# Patient Record
Sex: MALE | Race: WHITE | NOT HISPANIC OR LATINO | Employment: OTHER | URBAN - METROPOLITAN AREA
[De-identification: names, ages, dates, MRNs, and addresses within clinical notes are randomized per-mention and may not be internally consistent; named-entity substitution may affect disease eponyms.]

---

## 2017-02-14 ENCOUNTER — OFFICE VISIT (OUTPATIENT)
Dept: INTERNAL MEDICINE | Facility: CLINIC | Age: 42
End: 2017-02-14
Payer: MEDICARE

## 2017-02-14 VITALS
DIASTOLIC BLOOD PRESSURE: 72 MMHG | WEIGHT: 166.69 LBS | TEMPERATURE: 99 F | SYSTOLIC BLOOD PRESSURE: 110 MMHG | HEIGHT: 70 IN | BODY MASS INDEX: 23.86 KG/M2 | RESPIRATION RATE: 18 BRPM | HEART RATE: 108 BPM

## 2017-02-14 DIAGNOSIS — M51.35 DDD (DEGENERATIVE DISC DISEASE), THORACOLUMBAR: Primary | ICD-10-CM

## 2017-02-14 DIAGNOSIS — G47.00 INSOMNIA, UNSPECIFIED TYPE: ICD-10-CM

## 2017-02-14 PROCEDURE — 99999 PR PBB SHADOW E&M-EST. PATIENT-LVL III: CPT | Mod: PBBFAC,,, | Performed by: INTERNAL MEDICINE

## 2017-02-14 PROCEDURE — 99213 OFFICE O/P EST LOW 20 MIN: CPT | Mod: PBBFAC,PO | Performed by: INTERNAL MEDICINE

## 2017-02-14 PROCEDURE — 99213 OFFICE O/P EST LOW 20 MIN: CPT | Mod: S$PBB,,, | Performed by: INTERNAL MEDICINE

## 2017-02-14 RX ORDER — DEXTROAMPHETAMINE SACCHARATE, AMPHETAMINE ASPARTATE MONOHYDRATE, DEXTROAMPHETAMINE SULFATE AND AMPHETAMINE SULFATE 7.5; 7.5; 7.5; 7.5 MG/1; MG/1; MG/1; MG/1
CAPSULE, EXTENDED RELEASE ORAL
COMMUNITY
Start: 2017-02-13 | End: 2017-10-03

## 2017-02-14 NOTE — PROGRESS NOTES
CC: followup of back pain  HPI:  The patient is a 41 y.o. year old male who presents to the office for followup of back pain.  He complains of persistent dull aching sensation in mid back. He is unable to sit or stand for prolonged periods of time. Pain is exacerbated with flexion at the waist, but relieved with lying down. At worst, pain is 8/10. He has been taking ibuprofen 800 mg.  He only takes ibuprofen once every couple of weeks.  He reports being unable to fish is depressing.  The patient reports difficulty sleeping at night due to pain.  He has not taken gabapentin because he does not want to take medication.    PAST MEDICAL HISTORY:  Past Medical History   Diagnosis Date    Depression     Peptic ulcer     Scoliosis        SURGICAL HISTORY:  Past Surgical History   Procedure Laterality Date    Knee arthroscopy         MEDS:  Medcard reviewed and updated    ALLERGIES: Allergy Card reviewed and updated    SOCIAL HISTORY:   The patient is a nonsmoker.    PE:   APPEARANCE: Well nourished, well developed, in no acute distress.    CHEST: Lungs clear to auscultation with unlabored respirations.  CARDIOVASCULAR: Normal S1, S2. No murmurs. No carotid bruits. No pedal edema.  ABDOMEN: Bowel sounds normal. Not distended. Soft. No tenderness or masses.   MUSCULOSKELETAL:  Positive tenderness to palpation to mid back.  PSYCHIATRIC: The patient is oriented to person, place, and time and has a pleasant affect.        ASSESSMENT/PLAN:  Luis Daniel was seen today for follow-up.    Diagnoses and all orders for this visit:    DDD (degenerative disc disease), thoracolumbar  -     Continue ibuprofen  -     Patient is totally disabled from work    Insomnia, unspecified type  -     Recommend new mattress

## 2017-02-14 NOTE — MR AVS SNAPSHOT
[facility]   [facility]  [place] [address]  Phone: [phone]  Fax: [phone]                  Luis Daniel Wynne Jr.   2017 9:40 AM   Office Visit    Description:  Male : 1975   Provider:  Zenobia Dunbar MD   Department:  [facility]           Reason for Visit     Follow-up           Diagnoses this Visit        Comments    DDD (degenerative disc disease), thoracolumbar    -  Primary     Insomnia, unspecified type                To Do List           Goals (5 Years of Data)     None      Follow-Up and Disposition     Return in about 3 months (around 2017).      [facility] On Call     [facility] On Call Nurse [facility] -  Assistance  Registered nurses in the [facility] On Call Center provide clinical advisement, health education, appointment booking, and other advisory services.  Call for this free service at [phone].             Medications           Message regarding Medications     Verify the changes and/or additions to your medication regime listed below are the same as discussed with your clinician today.  If any of these changes or additions are incorrect, please notify your healthcare provider.        STOP taking these medications     VYVANSE 70 mg capsule     gabapentin (NEURONTIN) 100 MG capsule Take 1 capsule at bedtime x 1 week, then increase to 2 capsules at bedtime x 1 week, then increase to 3 capsules at bedtime.           Verify that the below list of medications is an accurate representation of the medications you are currently taking.  If none reported, the list may be blank. If incorrect, please contact your healthcare provider. Carry this list with you in case of emergency.           Current Medications     dextroamphetamine-amphetamine (ADDERALL XR) 30 MG 24 hr capsule     ibuprofen (ADVIL,MOTRIN) 800 MG tablet Take 1 tablet (800 mg total) by mouth every 6 (six) hours as needed.    ibuprofen (ADVIL,MOTRIN)  "800 MG tablet Take 1 tablet (800 mg total) by mouth every 6 (six) hours as needed.    venlafaxine (EFFEXOR-XR) 150 MG Cp24     venlafaxine (EFFEXOR-XR) 37.5 MG 24 hr capsule            Clinical Reference Information           Your Vitals Were     BP Pulse Temp Resp Height Weight    110/72 (BP Location: Left arm, Patient Position: Sitting, BP Method: Manual) 108 98.6 °F (37 °C) (Oral) 18 5' 10" (1.778 m) 75.6 kg (166 lb 10.7 oz)    BMI                23.91 kg/m2          Blood Pressure          Most Recent Value    BP  110/72      Allergies as of 2/14/2017     No Known Allergies      Immunizations Administered on Date of Encounter - 2/14/2017     None      Smoking Cessation     If you would like to quit smoking:   You may be eligible for free services if you are a [place] resident and started smoking cigarettes before September 1, 1988.  Call the [employer]oking [employer]ess[employer] ([facility]) toll free at [phone] or [phone].   Call 2-589-QUIT-NOW if you do not meet the above criteria.            Language Assistance Services     ATTENTION: Language assistance services are available, free of charge. Please call [phone].      ATENCIÓN: Si claytonla oriana, tiene a scanlon disposición servicios gratuitos de asistencia lingüística. Llame al [phone].     CHASITY Ý: N?u b?n nói Ti?ng Vi?t, có các d?ch v? h? tr? ngôn ng? mi?n phí dành cho b?n. G?i s? [phone].         [facility] complies with applicable Federal civil rights laws and does not discriminate on the basis of race, color, national origin, age, disability, or sex.        "

## 2017-10-03 ENCOUNTER — OFFICE VISIT (OUTPATIENT)
Dept: INTERNAL MEDICINE | Facility: CLINIC | Age: 42
End: 2017-10-03
Payer: MEDICARE

## 2017-10-03 VITALS
HEART RATE: 88 BPM | BODY MASS INDEX: 22.85 KG/M2 | TEMPERATURE: 98 F | SYSTOLIC BLOOD PRESSURE: 100 MMHG | DIASTOLIC BLOOD PRESSURE: 80 MMHG | WEIGHT: 159.63 LBS | HEIGHT: 70 IN

## 2017-10-03 DIAGNOSIS — M51.35 DDD (DEGENERATIVE DISC DISEASE), THORACOLUMBAR: Primary | ICD-10-CM

## 2017-10-03 DIAGNOSIS — M79.601 RIGHT ARM PAIN: ICD-10-CM

## 2017-10-03 PROCEDURE — 99999 PR PBB SHADOW E&M-EST. PATIENT-LVL III: CPT | Mod: PBBFAC,,, | Performed by: INTERNAL MEDICINE

## 2017-10-03 PROCEDURE — 99213 OFFICE O/P EST LOW 20 MIN: CPT | Mod: PBBFAC,PO | Performed by: INTERNAL MEDICINE

## 2017-10-03 PROCEDURE — 99213 OFFICE O/P EST LOW 20 MIN: CPT | Mod: S$PBB,,, | Performed by: INTERNAL MEDICINE

## 2017-10-03 RX ORDER — VENLAFAXINE HYDROCHLORIDE 150 MG/1
TABLET, EXTENDED RELEASE ORAL
COMMUNITY
Start: 2017-07-29 | End: 2018-06-27 | Stop reason: SDUPTHER

## 2017-10-03 RX ORDER — DEXTROAMPHETAMINE SACCHARATE, AMPHETAMINE ASPARTATE MONOHYDRATE, DEXTROAMPHETAMINE SULFATE AND AMPHETAMINE SULFATE 5; 5; 5; 5 MG/1; MG/1; MG/1; MG/1
CAPSULE, EXTENDED RELEASE ORAL
COMMUNITY
Start: 2017-09-25 | End: 2019-06-07

## 2017-10-03 RX ORDER — VENLAFAXINE HYDROCHLORIDE 75 MG/1
75 CAPSULE, EXTENDED RELEASE ORAL DAILY
Start: 2017-10-03 | End: 2019-06-07 | Stop reason: DRUGHIGH

## 2017-10-03 RX ORDER — DEXTROAMPHETAMINE SACCHARATE, AMPHETAMINE ASPARTATE, DEXTROAMPHETAMINE SULFATE AND AMPHETAMINE SULFATE 5; 5; 5; 5 MG/1; MG/1; MG/1; MG/1
TABLET ORAL
COMMUNITY
Start: 2017-08-15 | End: 2019-06-07

## 2017-10-03 NOTE — PROGRESS NOTES
CC: followup of back pain  HPI:  The patient is a 41 y.o. year old male who presents to the office for followup of back pain.  He reports right arm pain after trying to do some painting at his house.  He now reports intermittent difficulty grabbing things.  Pain extends down his right arm.  The patient states his back then started to hurt again after sleeping awkwardly.  Back pain is a persistent dull aching sensation in mid back. He is unable to sit or stand for prolonged periods of time. Pain is exacerbated with flexion at the waist, but relieved with lying down. At worst, pain is 7.5/10. He has been taking ibuprofen 800 mg recently.  Right arm pain is a constant throbbing sensation, 8/10 on a pain scale.  He states it hurts him more than his back.       PAST MEDICAL HISTORY:  Past Medical History:   Diagnosis Date    Depression     Fibromyalgia     Peptic ulcer     Scoliosis        SURGICAL HISTORY:  Past Surgical History:   Procedure Laterality Date    KNEE ARTHROSCOPY         MEDS:  Medcard reviewed and updated    ALLERGIES: Allergy Card reviewed and updated    SOCIAL HISTORY:   The patient is a nonsmoker.    PE:   APPEARANCE: Well nourished, well developed, in no acute distress.    CHEST: Lungs clear to auscultation with unlabored respirations.  CARDIOVASCULAR: Normal S1, S2. No murmurs. No carotid bruits. No pedal edema.  ABDOMEN: Bowel sounds normal. Not distended. Soft. No tenderness or masses.   MUSCULOSKELETAL:  Normal gait, positive tenderness to palpation to midback.  PSYCHIATRIC: The patient is oriented to person, place, and time and has a pleasant affect.        ASSESSMENT/PLAN:  Luis Daniel was seen today for follow-up and arm pain.    Diagnoses and all orders for this visit:    DDD (degenerative disc disease), thoracolumbar  -     Continue ibuprofen and venlafaxine    Right arm pain  -     Continue ibuprofen and venlafaxine    Other orders  -     venlafaxine (EFFEXOR-XR) 75 MG 24 hr capsule; Take 1  capsule (75 mg total) by mouth once daily.

## 2018-03-21 ENCOUNTER — OFFICE VISIT (OUTPATIENT)
Dept: INTERNAL MEDICINE | Facility: CLINIC | Age: 43
End: 2018-03-21
Payer: MEDICARE

## 2018-03-21 VITALS
SYSTOLIC BLOOD PRESSURE: 139 MMHG | DIASTOLIC BLOOD PRESSURE: 80 MMHG | TEMPERATURE: 98 F | BODY MASS INDEX: 22.32 KG/M2 | WEIGHT: 155.88 LBS | HEIGHT: 70 IN | HEART RATE: 88 BPM | RESPIRATION RATE: 16 BRPM

## 2018-03-21 DIAGNOSIS — M51.35 DDD (DEGENERATIVE DISC DISEASE), THORACOLUMBAR: Primary | ICD-10-CM

## 2018-03-21 DIAGNOSIS — M79.7 FIBROMYALGIA: ICD-10-CM

## 2018-03-21 DIAGNOSIS — E78.5 DYSLIPIDEMIA: ICD-10-CM

## 2018-03-21 PROCEDURE — 99213 OFFICE O/P EST LOW 20 MIN: CPT | Mod: PBBFAC,PO | Performed by: INTERNAL MEDICINE

## 2018-03-21 PROCEDURE — 99999 PR PBB SHADOW E&M-EST. PATIENT-LVL III: CPT | Mod: PBBFAC,,, | Performed by: INTERNAL MEDICINE

## 2018-03-21 PROCEDURE — 99213 OFFICE O/P EST LOW 20 MIN: CPT | Mod: S$PBB,,, | Performed by: INTERNAL MEDICINE

## 2018-03-21 RX ORDER — AMOXICILLIN 500 MG/1
CAPSULE ORAL
COMMUNITY
Start: 2018-01-27 | End: 2018-03-21

## 2018-03-21 RX ORDER — VENLAFAXINE HYDROCHLORIDE 150 MG/1
300 CAPSULE, EXTENDED RELEASE ORAL DAILY
COMMUNITY
Start: 2018-03-02 | End: 2023-09-30

## 2018-03-21 RX ORDER — EPINEPHRINE 0.3 MG/.3ML
1 INJECTION SUBCUTANEOUS ONCE
Qty: 1 EACH | Refills: 3 | Status: SHIPPED | OUTPATIENT
Start: 2018-03-21 | End: 2022-08-22

## 2018-03-21 RX ORDER — IBUPROFEN 800 MG/1
TABLET ORAL
COMMUNITY
Start: 2018-03-05 | End: 2018-09-10 | Stop reason: SDUPTHER

## 2018-03-21 RX ORDER — AMOXICILLIN 500 MG/1
TABLET, FILM COATED ORAL
COMMUNITY
Start: 2018-03-05 | End: 2018-03-21

## 2018-03-21 RX ORDER — TIZANIDINE 4 MG/1
4 TABLET ORAL NIGHTLY PRN
Qty: 30 TABLET | Refills: 3 | Status: SHIPPED | OUTPATIENT
Start: 2018-03-21 | End: 2019-09-09 | Stop reason: SDUPTHER

## 2018-03-21 NOTE — PROGRESS NOTES
CC: followup of back pain  HPI:  The patient is a 42 y.o. year old male who presents to the office for followup of  ba pain.  He reports suffering a compression fracture last October after being attacked by wasps.  He fell on his gluteus while walking up a slope.  Pain had improved after a few weeks.  He continues to experience pain due to fibromyalgia, primarily right shoulder pain.  The patient complains of knots in his muscles when he is in pain intermittently.  He complains of painful moles on bilateral legs.  He reports recent polyuria, but symptoms have resolved.    PAST MEDICAL HISTORY:  Past Medical History:   Diagnosis Date    Depression     Fibromyalgia     Peptic ulcer     Scoliosis        SURGICAL HISTORY:  Past Surgical History:   Procedure Laterality Date    KNEE ARTHROSCOPY         MEDS:  Medcard reviewed and updated    ALLERGIES: Allergy Card reviewed and updated    SOCIAL HISTORY:   The patient is a nonsmoker.    PE:   APPEARANCE: Well nourished, well developed, in no acute distress.    CHEST: Lungs clear to auscultation with unlabored respirations.  CARDIOVASCULAR: Normal S1, S2. No murmurs. No carotid bruits. No pedal edema.  ABDOMEN: Bowel sounds normal. Not distended. Soft. No tenderness or masses.   PSYCHIATRIC: The patient is oriented to person, place, and time and has a pleasant affect.        ASSESSMENT/PLAN:  Diagnoses and all orders for this visit:    DDD (degenerative disc disease), thoracolumbar  -     Continue anti-inflammatory    Fibromyalgia  -     Start zanaflex    Other orders  -     tiZANidine (ZANAFLEX) 4 MG tablet; Take 1 tablet (4 mg total) by mouth nightly as needed.  -     EPINEPHrine (EPIPEN) 0.3 mg/0.3 mL AtIn; Inject 0.3 mLs (0.3 mg total) into the muscle once.

## 2018-06-27 ENCOUNTER — OFFICE VISIT (OUTPATIENT)
Dept: INTERNAL MEDICINE | Facility: CLINIC | Age: 43
End: 2018-06-27
Payer: MEDICARE

## 2018-06-27 VITALS
HEART RATE: 88 BPM | WEIGHT: 155.44 LBS | SYSTOLIC BLOOD PRESSURE: 140 MMHG | HEIGHT: 70 IN | DIASTOLIC BLOOD PRESSURE: 70 MMHG | BODY MASS INDEX: 22.25 KG/M2 | TEMPERATURE: 98 F

## 2018-06-27 DIAGNOSIS — M79.7 FIBROMYALGIA: Primary | ICD-10-CM

## 2018-06-27 DIAGNOSIS — M51.35 DDD (DEGENERATIVE DISC DISEASE), THORACOLUMBAR: ICD-10-CM

## 2018-06-27 DIAGNOSIS — B07.9 VIRAL WART ON FINGER: ICD-10-CM

## 2018-06-27 PROCEDURE — 99214 OFFICE O/P EST MOD 30 MIN: CPT | Mod: S$PBB,,, | Performed by: INTERNAL MEDICINE

## 2018-06-27 PROCEDURE — 99999 PR PBB SHADOW E&M-EST. PATIENT-LVL IV: CPT | Mod: PBBFAC,,, | Performed by: INTERNAL MEDICINE

## 2018-06-27 PROCEDURE — 99214 OFFICE O/P EST MOD 30 MIN: CPT | Mod: PBBFAC,PO | Performed by: INTERNAL MEDICINE

## 2018-06-27 NOTE — PROGRESS NOTES
CC: followup of back pain  HPI:  The patient is a 42 y.o. year old male who presents to the office for followup of back pain.  He complains of low back pain a couple of weeks ago that resolved spontaneously.  He reports TENS unit helps with back spasms.  Patient request a new unit due to previous unit no longer functioning.    He reports right shoulder pain that resolved spontaneously.  He complains of a wart on the second digit of right hand.  He complains of pain in the affected finger.    PAST MEDICAL HISTORY:  Past Medical History:   Diagnosis Date    Depression     Fibromyalgia     Peptic ulcer     Scoliosis        SURGICAL HISTORY:  Past Surgical History:   Procedure Laterality Date    KNEE ARTHROSCOPY         MEDS:  Medcard reviewed and updated    ALLERGIES: Allergy Card reviewed and updated    SOCIAL HISTORY:   The patient is a nonsmoker.    PE:   APPEARANCE: Well nourished, well developed, in no acute distress.    CHEST: Lungs clear to auscultation with unlabored respirations.  CARDIOVASCULAR: Normal S1, S2. No murmurs. No carotid bruits. No pedal edema.  ABDOMEN: Bowel sounds normal. Not distended. Soft. No tenderness or masses.   MUSCULOSKELETAL:  Normal gait.  Positive tenderness to palpation of upper to mid back.  SKIN: Positive wart of second digit of right hand.  PSYCHIATRIC: The patient is oriented to person, place, and time and has a pleasant affect.        ASSESSMENT/PLAN:  Luis Daniel was seen today for follow-up.    Diagnoses and all orders for this visit:    Fibromyalgia  -     Continue Zanaflex    DDD (degenerative disc disease), thoracolumbar  -    Continue Zanaflex     Viral wart on finger  -     Ambulatory Referral to Dermatology    Other orders  -     TENS units Sri; 1 Units by Misc.(Non-Drug; Combo Route) route once daily.

## 2018-06-29 ENCOUNTER — TELEPHONE (OUTPATIENT)
Dept: DERMATOLOGY | Facility: CLINIC | Age: 43
End: 2018-06-29

## 2018-06-29 NOTE — TELEPHONE ENCOUNTER
----- Message from Joseph Castro sent at 6/29/2018  4:05 PM CDT -----  Contact: Patient   Patient Returning Call from [facility]    Who Left Message for Patient: Mayuri   Communication Preference: phone# [phone]  Additional Information:

## 2018-06-29 NOTE — TELEPHONE ENCOUNTER
----- Message from Joseph Castro sent at 6/29/2018  3:38 PM CDT -----  Contact: Patient   Patient Requesting Sooner Appointment.     Reason for sooner appt.: painful wart on index finger   When is the first available appointment? 8/15/18  Communication Preference: phone#  [phone]  Additional Information:

## 2018-09-10 ENCOUNTER — OFFICE VISIT (OUTPATIENT)
Dept: INTERNAL MEDICINE | Facility: CLINIC | Age: 43
End: 2018-09-10
Payer: MEDICARE

## 2018-09-10 VITALS
BODY MASS INDEX: 22.09 KG/M2 | HEIGHT: 70 IN | RESPIRATION RATE: 12 BRPM | WEIGHT: 154.31 LBS | HEART RATE: 76 BPM | DIASTOLIC BLOOD PRESSURE: 60 MMHG | SYSTOLIC BLOOD PRESSURE: 102 MMHG | TEMPERATURE: 98 F

## 2018-09-10 DIAGNOSIS — M79.7 FIBROMYALGIA: Primary | ICD-10-CM

## 2018-09-10 PROCEDURE — 99213 OFFICE O/P EST LOW 20 MIN: CPT | Mod: PBBFAC,PO | Performed by: INTERNAL MEDICINE

## 2018-09-10 PROCEDURE — 99999 PR PBB SHADOW E&M-EST. PATIENT-LVL III: CPT | Mod: PBBFAC,,, | Performed by: INTERNAL MEDICINE

## 2018-09-10 PROCEDURE — 99213 OFFICE O/P EST LOW 20 MIN: CPT | Mod: S$PBB,,, | Performed by: INTERNAL MEDICINE

## 2018-09-10 RX ORDER — IBUPROFEN 800 MG/1
800 TABLET ORAL 3 TIMES DAILY PRN
Qty: 30 TABLET | Refills: 3 | Status: SHIPPED | OUTPATIENT
Start: 2018-09-10 | End: 2019-02-12 | Stop reason: SDUPTHER

## 2018-09-10 NOTE — PROGRESS NOTES
CC: followup of fibromyalgia  HPI:  The patient is a 42 y.o. year old male who presents to the office for followup of fibromyalgia.  The patient reports intermittent diffuse spasms.  He was unable to afford TENS unit.  He states he does stretch, but not on a consistent daily.      PAST MEDICAL HISTORY:  Past Medical History:   Diagnosis Date    Depression     Fibromyalgia     Peptic ulcer     Scoliosis        SURGICAL HISTORY:  Past Surgical History:   Procedure Laterality Date    INJECTION, STEROID, SPINE, THORACIC, EPIDURAL N/A 10/1/2013    Performed by Amanda Hall MD at [facility] PAIN MGT    KNEE ARTHROSCOPY         MEDS:  Medcard reviewed and updated    ALLERGIES: Allergy Card reviewed and updated    SOCIAL HISTORY:   The patient is a nonsmoker.    PE:   APPEARANCE: Well nourished, well developed, in no acute distress.    CHEST: Lungs clear to auscultation with unlabored respirations.  CARDIOVASCULAR: Normal S1, S2. No murmurs. No carotid bruits. No pedal edema.  ABDOMEN: Bowel sounds normal. Not distended. Soft. No tenderness or masses.   PSYCHIATRIC: The patient is oriented to person, place, and time and has a pleasant affect.        ASSESSMENT/PLAN:  Luis Daniel was seen today for 3 month f/u.    Diagnoses and all orders for this visit:    Fibromyalgia  -     continue current therapy, stable    Other orders  -     ibuprofen (ADVIL,MOTRIN) 800 MG tablet; Take 1 tablet (800 mg total) by mouth 3 (three) times daily as needed for Pain.

## 2019-02-12 RX ORDER — IBUPROFEN 800 MG/1
800 TABLET ORAL 3 TIMES DAILY PRN
Qty: 30 TABLET | Refills: 3 | Status: SHIPPED | OUTPATIENT
Start: 2019-02-12 | End: 2019-09-05 | Stop reason: SDUPTHER

## 2019-02-12 NOTE — TELEPHONE ENCOUNTER
----- Message from Liz Dumont sent at 2/12/2019  2:38 PM CST -----  Contact: Luis Daniel Wynne [phone]  The patient is requesting a refill     ibuprofen (ADVIL,MOTRIN) 800 MG tablet    Please send to [facility] [address] [place] [address] AT [facility] [address] [phone] (Phone)  [phone] (Fax)

## 2019-06-06 NOTE — PROGRESS NOTES
CC: followup of fibromyalgia  HPI:  The patient is a 43 y.o. year old male who presents to the office for followup of fibromyalgia.  He reports low back pain since pilonidal cyst.  Pain is exacerbated with sitting down.      PAST MEDICAL HISTORY:  Past Medical History:   Diagnosis Date    Depression     Fibromyalgia     Peptic ulcer     Scoliosis        SURGICAL HISTORY:  Past Surgical History:   Procedure Laterality Date    INJECTION, STEROID, SPINE, THORACIC, EPIDURAL N/A 10/1/2013    Performed by [facility] at [facility]    KNEE ARTHROSCOPY         MEDS:  Medcard reviewed and updated    ALLERGIES: Allergy Card reviewed and updated    SOCIAL HISTORY:   The patient is a nonsmoker.    PE:   APPEARANCE: Well nourished, well developed, in no acute distress.    CHEST: Lungs clear to auscultation with unlabored respirations.  CARDIOVASCULAR: Normal S1, S2. No murmurs. No carotid bruits. No pedal edema.  ABDOMEN: Bowel sounds normal. Not distended. Soft. No tenderness or masses.   MUSCULOSKELETAL:  Positive tenderness to palpation of entire back.  Positive tenderness to palpation of bilateral wrist and elbow joints.  PSYCHIATRIC: The patient is oriented to person, place, and time and has a pleasant affect.        ASSESSMENT/PLAN:  Luis Daniel was seen today for back pain.    Diagnoses and all orders for this visit:    Gluteal pain  -     X-Ray Sacrum And Coccyx; Future  -     Sedimentation rate; Future    Dyslipidemia  -     CBC auto differential; Future  -     Comprehensive metabolic panel; Future  -     Lipid panel; Future    Fibromyalgia  -     continue ibuprofen and muscle relaxer

## 2019-06-07 ENCOUNTER — OFFICE VISIT (OUTPATIENT)
Dept: INTERNAL MEDICINE | Facility: CLINIC | Age: 44
End: 2019-06-07
Payer: MEDICARE

## 2019-06-07 ENCOUNTER — HOSPITAL ENCOUNTER (OUTPATIENT)
Dept: RADIOLOGY | Facility: HOSPITAL | Age: 44
Discharge: HOME | End: 2019-06-07
Attending: INTERNAL MEDICINE
Payer: MEDICARE

## 2019-06-07 VITALS
HEART RATE: 90 BPM | RESPIRATION RATE: 18 BRPM | BODY MASS INDEX: 23.42 KG/M2 | HEIGHT: 70 IN | WEIGHT: 163.56 LBS | TEMPERATURE: 98 F | SYSTOLIC BLOOD PRESSURE: 118 MMHG | DIASTOLIC BLOOD PRESSURE: 74 MMHG

## 2019-06-07 DIAGNOSIS — M79.18 GLUTEAL PAIN: Primary | ICD-10-CM

## 2019-06-07 DIAGNOSIS — M79.7 FIBROMYALGIA: ICD-10-CM

## 2019-06-07 DIAGNOSIS — E78.5 DYSLIPIDEMIA: ICD-10-CM

## 2019-06-07 DIAGNOSIS — M79.18 GLUTEAL PAIN: ICD-10-CM

## 2019-06-07 PROCEDURE — 99213 OFFICE O/P EST LOW 20 MIN: CPT | Mod: PBBFAC,25,PO | Performed by: INTERNAL MEDICINE

## 2019-06-07 PROCEDURE — 99999 PR PBB SHADOW E&M-EST. PATIENT-LVL III: ICD-10-PCS | Mod: PBBFAC,,, | Performed by: INTERNAL MEDICINE

## 2019-06-07 PROCEDURE — 99214 OFFICE O/P EST MOD 30 MIN: CPT | Mod: S$PBB,,, | Performed by: INTERNAL MEDICINE

## 2019-06-07 PROCEDURE — 99214 PR OFFICE/OUTPT VISIT, EST, LEVL IV, 30-39 MIN: ICD-10-PCS | Mod: S$PBB,,, | Performed by: INTERNAL MEDICINE

## 2019-06-07 PROCEDURE — 72220 X-RAY EXAM SACRUM TAILBONE: CPT | Mod: 26,,, | Performed by: RADIOLOGY

## 2019-06-07 PROCEDURE — 72220 XR SACRUM AND COCCYX: ICD-10-PCS | Mod: 26,,, | Performed by: RADIOLOGY

## 2019-06-07 PROCEDURE — 99999 PR PBB SHADOW E&M-EST. PATIENT-LVL III: CPT | Mod: PBBFAC,,, | Performed by: INTERNAL MEDICINE

## 2019-06-07 PROCEDURE — 72220 X-RAY EXAM SACRUM TAILBONE: CPT | Mod: TC,PO

## 2019-06-07 RX ORDER — DEXTROAMPHETAMINE SACCHARATE, AMPHETAMINE ASPARTATE MONOHYDRATE, DEXTROAMPHETAMINE SULFATE AND AMPHETAMINE SULFATE 7.5; 7.5; 7.5; 7.5 MG/1; MG/1; MG/1; MG/1
CAPSULE, EXTENDED RELEASE ORAL
Refills: 0 | COMMUNITY
Start: 2019-05-13 | End: 2019-09-09 | Stop reason: DRUGHIGH

## 2019-06-14 ENCOUNTER — LAB VISIT (OUTPATIENT)
Dept: LAB | Facility: HOSPITAL | Age: 44
End: 2019-06-14
Attending: INTERNAL MEDICINE
Payer: MEDICARE

## 2019-06-14 DIAGNOSIS — M79.18 GLUTEAL PAIN: ICD-10-CM

## 2019-06-14 DIAGNOSIS — E78.5 DYSLIPIDEMIA: ICD-10-CM

## 2019-06-14 LAB
ALBUMIN SERPL BCP-MCNC: 4.1 G/DL (ref 3.5–5.2)
ALP SERPL-CCNC: 81 U/L (ref 55–135)
ALT SERPL W/O P-5'-P-CCNC: 16 U/L (ref 10–44)
ANION GAP SERPL CALC-SCNC: 8 MMOL/L (ref 8–16)
AST SERPL-CCNC: 19 U/L (ref 10–40)
BASOPHILS # BLD AUTO: 0.03 K/UL (ref 0–0.2)
BASOPHILS NFR BLD: 0.4 % (ref 0–1.9)
BILIRUB SERPL-MCNC: 0.8 MG/DL (ref 0.1–1)
BUN SERPL-MCNC: 14 MG/DL (ref 6–20)
CALCIUM SERPL-MCNC: 9.3 MG/DL (ref 8.7–10.5)
CHLORIDE SERPL-SCNC: 105 MMOL/L (ref 95–110)
CHOLEST SERPL-MCNC: 176 MG/DL (ref 120–199)
CHOLEST/HDLC SERPL: 4.5 {RATIO} (ref 2–5)
CO2 SERPL-SCNC: 27 MMOL/L (ref 23–29)
CREAT SERPL-MCNC: 1 MG/DL (ref 0.5–1.4)
DIFFERENTIAL METHOD: ABNORMAL
EOSINOPHIL # BLD AUTO: 0.2 K/UL (ref 0–0.5)
EOSINOPHIL NFR BLD: 2.7 % (ref 0–8)
ERYTHROCYTE [DISTWIDTH] IN BLOOD BY AUTOMATED COUNT: 13.1 % (ref 11.5–14.5)
ERYTHROCYTE [SEDIMENTATION RATE] IN BLOOD BY WESTERGREN METHOD: 4 MM/HR (ref 0–10)
EST. GFR  (AFRICAN AMERICAN): >60 ML/MIN/1.73 M^2
EST. GFR  (NON AFRICAN AMERICAN): >60 ML/MIN/1.73 M^2
GLUCOSE SERPL-MCNC: 100 MG/DL (ref 70–110)
HCT VFR BLD AUTO: 43 % (ref 40–54)
HDLC SERPL-MCNC: 39 MG/DL (ref 40–75)
HDLC SERPL: 22.2 % (ref 20–50)
HGB BLD-MCNC: 14.7 G/DL (ref 14–18)
LDLC SERPL CALC-MCNC: 120 MG/DL (ref 63–159)
LYMPHOCYTES # BLD AUTO: 1.1 K/UL (ref 1–4.8)
LYMPHOCYTES NFR BLD: 16.6 % (ref 18–48)
MCH RBC QN AUTO: 31 PG (ref 27–31)
MCHC RBC AUTO-ENTMCNC: 34.2 G/DL (ref 32–36)
MCV RBC AUTO: 91 FL (ref 82–98)
MONOCYTES # BLD AUTO: 0.5 K/UL (ref 0.3–1)
MONOCYTES NFR BLD: 8 % (ref 4–15)
NEUTROPHILS # BLD AUTO: 4.9 K/UL (ref 1.8–7.7)
NEUTROPHILS NFR BLD: 72.3 % (ref 38–73)
NONHDLC SERPL-MCNC: 137 MG/DL
PLATELET # BLD AUTO: 209 K/UL (ref 150–350)
PMV BLD AUTO: 10.3 FL (ref 9.2–12.9)
POTASSIUM SERPL-SCNC: 4 MMOL/L (ref 3.5–5.1)
PROT SERPL-MCNC: 7.3 G/DL (ref 6–8.4)
RBC # BLD AUTO: 4.74 M/UL (ref 4.6–6.2)
SODIUM SERPL-SCNC: 140 MMOL/L (ref 136–145)
TRIGL SERPL-MCNC: 85 MG/DL (ref 30–150)
WBC # BLD AUTO: 6.75 K/UL (ref 3.9–12.7)

## 2019-06-14 PROCEDURE — 80053 COMPREHEN METABOLIC PANEL: CPT

## 2019-06-14 PROCEDURE — 85025 COMPLETE CBC W/AUTO DIFF WBC: CPT

## 2019-06-14 PROCEDURE — 36415 COLL VENOUS BLD VENIPUNCTURE: CPT

## 2019-06-14 PROCEDURE — 80061 LIPID PANEL: CPT

## 2019-06-14 PROCEDURE — 85651 RBC SED RATE NONAUTOMATED: CPT

## 2019-09-05 RX ORDER — IBUPROFEN 800 MG/1
800 TABLET ORAL 3 TIMES DAILY PRN
Qty: 30 TABLET | Refills: 3 | Status: SHIPPED | OUTPATIENT
Start: 2019-09-05 | End: 2021-02-04 | Stop reason: SDUPTHER

## 2019-09-09 ENCOUNTER — OFFICE VISIT (OUTPATIENT)
Dept: INTERNAL MEDICINE | Facility: CLINIC | Age: 44
End: 2019-09-09
Payer: MEDICARE

## 2019-09-09 VITALS
WEIGHT: 165.38 LBS | HEIGHT: 70 IN | HEART RATE: 96 BPM | BODY MASS INDEX: 23.68 KG/M2 | DIASTOLIC BLOOD PRESSURE: 72 MMHG | RESPIRATION RATE: 20 BRPM | SYSTOLIC BLOOD PRESSURE: 108 MMHG | TEMPERATURE: 98 F

## 2019-09-09 DIAGNOSIS — K62.5 RECTAL BLEEDING: ICD-10-CM

## 2019-09-09 DIAGNOSIS — M79.7 FIBROMYALGIA: Primary | ICD-10-CM

## 2019-09-09 PROCEDURE — 99213 PR OFFICE/OUTPT VISIT, EST, LEVL III, 20-29 MIN: ICD-10-PCS | Mod: S$PBB,,, | Performed by: INTERNAL MEDICINE

## 2019-09-09 PROCEDURE — 99214 OFFICE O/P EST MOD 30 MIN: CPT | Mod: PBBFAC,PO | Performed by: INTERNAL MEDICINE

## 2019-09-09 PROCEDURE — 99999 PR PBB SHADOW E&M-EST. PATIENT-LVL IV: CPT | Mod: PBBFAC,,, | Performed by: INTERNAL MEDICINE

## 2019-09-09 PROCEDURE — 99213 OFFICE O/P EST LOW 20 MIN: CPT | Mod: S$PBB,,, | Performed by: INTERNAL MEDICINE

## 2019-09-09 PROCEDURE — 99999 PR PBB SHADOW E&M-EST. PATIENT-LVL IV: ICD-10-PCS | Mod: PBBFAC,,, | Performed by: INTERNAL MEDICINE

## 2019-09-09 RX ORDER — TIZANIDINE 4 MG/1
4 TABLET ORAL NIGHTLY PRN
Qty: 30 TABLET | Refills: 3 | Status: SHIPPED | OUTPATIENT
Start: 2019-09-09 | End: 2021-05-03

## 2019-09-09 RX ORDER — DEXTROAMPHETAMINE SACCHARATE, AMPHETAMINE ASPARTATE MONOHYDRATE, DEXTROAMPHETAMINE SULFATE AND AMPHETAMINE SULFATE 5; 5; 5; 5 MG/1; MG/1; MG/1; MG/1
CAPSULE, EXTENDED RELEASE ORAL
Refills: 0 | Status: ON HOLD | COMMUNITY
Start: 2019-08-12 | End: 2023-06-04 | Stop reason: HOSPADM

## 2019-09-09 NOTE — PROGRESS NOTES
CC: followup of fibromyalgia  HPI:  The patient is a 43 y.o. year old male who presents to the office for followup of fibromyalgia.  He reports gluteal pain have improved somewhat.  He denies any constipation or rectal pain.  The patient does report occasional blood in stool and sensation of incomplete evacuation.  He also reports intermittent flareup of fibromyalgia with physical exertion.  The patient has not taken the zanaflex.    PAST MEDICAL HISTORY:  Past Medical History:   Diagnosis Date    Depression     Fibromyalgia     Peptic ulcer     Scoliosis        SURGICAL HISTORY:  Past Surgical History:   Procedure Laterality Date    INJECTION, STEROID, SPINE, THORACIC, EPIDURAL N/A 10/1/2013    Performed by Amanda Hall MD at [facility] PAIN T    KNEE ARTHROSCOPY         MEDS:  Medcard reviewed and updated    ALLERGIES: Allergy Card reviewed and updated    SOCIAL HISTORY:   The patient is a nonsmoker.    PE:   APPEARANCE: Well nourished, well developed, in no acute distress.    CHEST: Lungs clear to auscultation with unlabored respirations.  CARDIOVASCULAR: Normal S1, S2. No murmurs. No carotid bruits. No pedal edema.  ABDOMEN: Bowel sounds normal. Not distended. Soft. No tenderness or masses.   PSYCHIATRIC: The patient is oriented to person, place, and time and has a pleasant affect.        ASSESSMENT/PLAN:  Luis Daniel was seen today for follow-up.    Diagnoses and all orders for this visit:    Fibromyalgia  -     Start zanaflex    Rectal bleeding  -     Ambulatory Referral to Colorectal Surgery    Other orders  -     tiZANidine (ZANAFLEX) 4 MG tablet; Take 1 tablet (4 mg total) by mouth nightly as needed.

## 2020-06-04 ENCOUNTER — TELEPHONE (OUTPATIENT)
Dept: INTERNAL MEDICINE | Facility: CLINIC | Age: 45
End: 2020-06-04

## 2020-06-04 NOTE — TELEPHONE ENCOUNTER
Called and spoke with the pt and he advised that he needed forms completed and he was unable to schedule an appointment. Appointment scheduled and also My chart is working as he wanted a Virtual appointment. Termed the call

## 2020-06-04 NOTE — TELEPHONE ENCOUNTER
----- Message from Mireya Richards sent at 6/4/2020 10:52 AM CDT -----  Contact: Patient [phone]  Patient needs disability paperwork filled out and would like to speak with you.    Please call and advise.    Thank You

## 2020-06-20 ENCOUNTER — OFFICE VISIT (OUTPATIENT)
Dept: INTERNAL MEDICINE | Facility: CLINIC | Age: 45
End: 2020-06-20
Payer: MEDICARE

## 2020-06-20 DIAGNOSIS — M79.7 FIBROMYALGIA: Primary | ICD-10-CM

## 2020-06-20 PROCEDURE — 99213 OFFICE O/P EST LOW 20 MIN: CPT | Mod: 95,,, | Performed by: INTERNAL MEDICINE

## 2020-06-20 PROCEDURE — 99213 PR OFFICE/OUTPT VISIT, EST, LEVL III, 20-29 MIN: ICD-10-PCS | Mod: 95,,, | Performed by: INTERNAL MEDICINE

## 2020-06-20 NOTE — PROGRESS NOTES
The patient location is: home  The chief complaint leading to consultation is:  Follow-up of fibromyalgia    Visit type: audiovisual    Face to Face time with patient: 12   minutes of total time spent on the encounter, which includes face to face time and non-face to face time preparing to see the patient (eg, review of tests), Obtaining and/or reviewing separately obtained history, Documenting clinical information in the electronic or other health record, Independently interpreting results (not separately reported) and communicating results to the patient/family/caregiver, or Care coordination (not separately reported).         Each patient to whom he or she provides medical services by telemedicine is:  (1) informed of the relationship between the physician and patient and the respective role of any other health care provider with respect to management of the patient; and (2) notified that he or she may decline to receive medical services by telemedicine and may withdraw from such care at any time.    Notes:   CC: followup of fibromyalgia  HPI:  The patient is a 44 y.o. year old male who presents for follow-up of fibromyalgia.  He reports he is not sleeping well, but is trying to be active enough to keep his symptoms controlled.  He reports Zanaflex is ineffective.  Pain is primarily in his back and shoulders, which limits his range of motion.  He describes knots in his mid back at the level of her shoulder blades and base of his neck.      PAST MEDICAL HISTORY:  Past Medical History:   Diagnosis Date    Depression     Fibromyalgia     Peptic ulcer     Scoliosis        SURGICAL HISTORY:  Past Surgical History:   Procedure Laterality Date    KNEE ARTHROSCOPY         MEDS:  Medcard reviewed and updated    ALLERGIES: Allergy Card reviewed and updated    SOCIAL HISTORY:   The patient is a nonsmoker.    PE:   APPEARANCE: Well nourished, well developed, in no acute distress.    Video visit  PSYCHIATRIC: The patient  is oriented to person, place, and time and has a pleasant affect.        ASSESSMENT/PLAN:  Diagnoses and all orders for this visit:    Fibromyalgia  -     continue ibuprofen as needed and exercise as tolerated

## 2020-07-17 DIAGNOSIS — Z71.89 COMPLEX CARE COORDINATION: ICD-10-CM

## 2020-08-26 ENCOUNTER — TELEPHONE (OUTPATIENT)
Dept: INTERNAL MEDICINE | Facility: CLINIC | Age: 45
End: 2020-08-26

## 2020-08-26 NOTE — TELEPHONE ENCOUNTER
"----- Message from Richard Welch sent at 8/26/2020  1:17 PM CDT -----  Regarding: Advice  Contact: Patient [phone]  Patient would like to get medical advice.     Comments: patient calling to check on the status of the "Disability" forms, call to update on status.  Stating disability check will stop soon without the forms being filled out and sent in.    Please call an advise  Thank you    "

## 2020-08-26 NOTE — TELEPHONE ENCOUNTER
Called and spoke with the pt and he advised that the form was not received and he gave another number to fax it to

## 2021-02-04 RX ORDER — IBUPROFEN 800 MG/1
800 TABLET ORAL 3 TIMES DAILY PRN
Qty: 30 TABLET | Refills: 3 | Status: SHIPPED | OUTPATIENT
Start: 2021-02-04 | End: 2021-10-22 | Stop reason: SDUPTHER

## 2021-04-13 ENCOUNTER — OFFICE VISIT (OUTPATIENT)
Dept: INTERNAL MEDICINE | Facility: CLINIC | Age: 46
End: 2021-04-13
Payer: COMMERCIAL

## 2021-04-13 DIAGNOSIS — M54.6 THORACIC BACK PAIN, UNSPECIFIED BACK PAIN LATERALITY, UNSPECIFIED CHRONICITY: Primary | ICD-10-CM

## 2021-04-13 PROCEDURE — 99213 OFFICE O/P EST LOW 20 MIN: CPT | Mod: 95,,, | Performed by: INTERNAL MEDICINE

## 2021-04-13 PROCEDURE — 99213 PR OFFICE/OUTPT VISIT, EST, LEVL III, 20-29 MIN: ICD-10-PCS | Mod: 95,,, | Performed by: INTERNAL MEDICINE

## 2021-04-20 ENCOUNTER — PATIENT MESSAGE (OUTPATIENT)
Dept: INTERNAL MEDICINE | Facility: CLINIC | Age: 46
End: 2021-04-20

## 2021-05-04 ENCOUNTER — PATIENT MESSAGE (OUTPATIENT)
Dept: RESEARCH | Facility: HOSPITAL | Age: 46
End: 2021-05-04

## 2021-07-26 ENCOUNTER — OFFICE VISIT (OUTPATIENT)
Dept: INTERNAL MEDICINE | Facility: CLINIC | Age: 46
End: 2021-07-26
Payer: MEDICARE

## 2021-07-26 DIAGNOSIS — K64.9 HEMORRHOIDS, UNSPECIFIED HEMORRHOID TYPE: ICD-10-CM

## 2021-07-26 DIAGNOSIS — M54.6 THORACIC BACK PAIN, UNSPECIFIED BACK PAIN LATERALITY, UNSPECIFIED CHRONICITY: Primary | ICD-10-CM

## 2021-07-26 PROCEDURE — 99213 OFFICE O/P EST LOW 20 MIN: CPT | Mod: 95,,, | Performed by: INTERNAL MEDICINE

## 2021-07-26 PROCEDURE — 99213 PR OFFICE/OUTPT VISIT, EST, LEVL III, 20-29 MIN: ICD-10-PCS | Mod: 95,,, | Performed by: INTERNAL MEDICINE

## 2021-07-26 RX ORDER — HYDROCORTISONE 25 MG/G
CREAM TOPICAL 2 TIMES DAILY
Qty: 20 G | Refills: 3 | Status: SHIPPED | OUTPATIENT
Start: 2021-07-26 | End: 2021-10-22 | Stop reason: SDUPTHER

## 2021-07-29 ENCOUNTER — PATIENT MESSAGE (OUTPATIENT)
Dept: INTERNAL MEDICINE | Facility: CLINIC | Age: 46
End: 2021-07-29

## 2021-08-10 ENCOUNTER — PATIENT MESSAGE (OUTPATIENT)
Dept: INTERNAL MEDICINE | Facility: CLINIC | Age: 46
End: 2021-08-10

## 2021-08-20 ENCOUNTER — TELEPHONE (OUTPATIENT)
Dept: INTERNAL MEDICINE | Facility: CLINIC | Age: 46
End: 2021-08-20
Payer: COMMERCIAL

## 2021-10-22 RX ORDER — HYDROCORTISONE 25 MG/G
CREAM TOPICAL 2 TIMES DAILY
Qty: 20 G | Refills: 3 | Status: SHIPPED | OUTPATIENT
Start: 2021-10-22 | End: 2022-03-18

## 2021-10-22 RX ORDER — IBUPROFEN 800 MG/1
800 TABLET ORAL 3 TIMES DAILY PRN
Qty: 30 TABLET | Refills: 3 | Status: ON HOLD | OUTPATIENT
Start: 2021-10-22 | End: 2023-06-04 | Stop reason: HOSPADM

## 2022-02-22 ENCOUNTER — PATIENT MESSAGE (OUTPATIENT)
Dept: INTERNAL MEDICINE | Facility: CLINIC | Age: 47
End: 2022-02-22
Payer: COMMERCIAL

## 2022-02-22 NOTE — TELEPHONE ENCOUNTER
Pt requesting referral for physical therapy- for muscle tightening from waist up . Pt also c/o right elbow pain that hurts to touch- 3 months. Please advise

## 2022-03-10 ENCOUNTER — PATIENT MESSAGE (OUTPATIENT)
Dept: INTERNAL MEDICINE | Facility: CLINIC | Age: 47
End: 2022-03-10
Payer: COMMERCIAL

## 2022-03-10 DIAGNOSIS — M25.522 LEFT ELBOW PAIN: Primary | ICD-10-CM

## 2022-03-11 NOTE — TELEPHONE ENCOUNTER
Attempted to contact patient.  Left message to call the office.  Please advise if patient has any redness, swelling and/or increase warmth involving his elbow.  I have put in a referral to orthopedics.

## 2022-03-16 ENCOUNTER — TELEPHONE (OUTPATIENT)
Dept: ORTHOPEDICS | Facility: CLINIC | Age: 47
End: 2022-03-16
Payer: COMMERCIAL

## 2022-03-16 ENCOUNTER — PATIENT OUTREACH (OUTPATIENT)
Dept: ADMINISTRATIVE | Facility: OTHER | Age: 47
End: 2022-03-16
Payer: COMMERCIAL

## 2022-03-16 DIAGNOSIS — M25.522 ELBOW PAIN, LEFT: Primary | ICD-10-CM

## 2022-03-16 DIAGNOSIS — Z12.11 ENCOUNTER FOR FIT (FECAL IMMUNOCHEMICAL TEST) SCREENING: Primary | ICD-10-CM

## 2022-03-16 DIAGNOSIS — M25.522 LEFT ELBOW PAIN: Primary | ICD-10-CM

## 2022-03-16 NOTE — PROGRESS NOTES
Health Maintenance Due   Topic Date Due    Hepatitis C Screening  Never done    COVID-19 Vaccine (1) Never done    HIV Screening  Never done    TETANUS VACCINE  Never done    Colorectal Cancer Screening  Never done    Influenza Vaccine (1) Never done     Updates were requested from care everywhere.  Chart was reviewed for overdue Proactive [facility] Encounters ([facility]) topics (CRS, Breast Cancer Screening, Eye exam)  Health Maintenance has been updated.  [place] immunization registry triggered.  Immunizations were reconciled.

## 2022-03-16 NOTE — PROGRESS NOTES
Subjective:      Patient ID: Luis Daniel Wynne Jr. is a 46 y.o. male.    Chief Complaint: No chief complaint on file.      HPI  (French)    4 month history of left elbow pain that started after rolling over and hitting it on the wall.     He has intermittent left elbow pain that is worse with lifting and pulling his bow back. No numbness or tingling. He is right hand dominant. Pain is better with rest. His pain can get up to an 8.     He is taking motrin prn with some relief. No injections, PT, or surgery on his elbow.       Past Medical History:   Diagnosis Date    Depression     Fibromyalgia     Peptic ulcer     Scoliosis          Current Outpatient Medications:     dextroamphetamine-amphetamine (ADDERALL XR) 20 MG 24 hr capsule, TK ONE C PO  BID, Disp: , Rfl: 0    ibuprofen (ADVIL,MOTRIN) 800 MG tablet, Take 1 tablet (800 mg total) by mouth 3 (three) times daily as needed for Pain., Disp: 30 tablet, Rfl: 3    venlafaxine (EFFEXOR-XR) 150 MG Cp24, , Disp: , Rfl:     EPINEPHrine (EPIPEN) 0.3 mg/0.3 mL AtIn, Inject 0.3 mLs (0.3 mg total) into the muscle once., Disp: 1 each, Rfl: 3  No current facility-administered medications for this visit.    Review of patient's allergies indicates:  No Known Allergies    Review of Systems   Constitutional: Negative for fever, malaise/fatigue, night sweats, weight gain and weight loss.   HENT: Negative for hearing loss, nosebleeds and odynophagia.    Eyes: Negative for blurred vision and double vision.   Cardiovascular: Negative for chest pain, irregular heartbeat and palpitations.   Respiratory: Negative for cough, hemoptysis, shortness of breath and wheezing.    Endocrine: Negative for cold intolerance and polydipsia.   Hematologic/Lymphatic: Does not bruise/bleed easily.   Skin: Negative for dry skin, poor wound healing, rash and suspicious lesions.   Musculoskeletal:        See HPI for pertinent positives.   Gastrointestinal: Negative for bloating, abdominal pain,  "constipation, diarrhea, hematochezia, melena, nausea and vomiting.   Genitourinary: Negative for bladder incontinence, dysuria, hematuria, hesitancy and incomplete emptying.   Neurological: Negative for disturbances in coordination, dizziness, focal weakness, headaches, loss of balance, numbness, paresthesias, seizures and weakness.   Psychiatric/Behavioral: Negative for depression and hallucinations. The patient is not nervous/anxious.          Objective:        Ht 5' 10" (1.778 m)   Wt 74.4 kg (164 lb)   BMI 23.53 kg/m²     General    Vitals reviewed.  Constitutional: He is oriented to person, place, and time. He appears well-developed and well-nourished.   Pulmonary/Chest: Effort normal.   Abdominal: He exhibits no distension.   Neurological: He is alert and oriented to person, place, and time.   Psychiatric: He has a normal mood and affect. His behavior is normal. Judgment and thought content normal.           LEFT ELBOW EXAM:  Mild pain with good ROM.     Tenderness To Palpation:  Mild tenderness at the lateral epicondyle.  No tenderness at the olecranon.  No tenderness at the distal humerus or proximal radius/ulna.  No tenderness at the radial head.    Strength Testing:  Deltoid - 5/5   Biceps - 5/5   Triceps - 5/5   Wrist extension - 5/5   Wrist flexion - 5/5    - 5/5   Finger extension - 5/5   Finger abduction - 5/5     Special Tests:  No laxity of the MCL at 0 and 30 degrees.    No laxity of the LCL at 0 and 30 degrees.    3rd digit extension resistance test - positive  Resisted supination - negative  Resisted pronation - negative  Resisted wrist extension (Cozen's test) - negative  Resisted wrist flexion - negative    RIGHT ELBOW EXAM:  Good ROM with no pain    Tenderness To Palpation:  No tenderness at the medial epicondyle or lateral epicondyle.  No tenderness at the olecranon.  No tenderness at the distal humerus or proximal radius/ulna.  No tenderness at the radial head.    Strength " Testing:  Deltoid - 5/5   Biceps - 5/5   Triceps - 5/5   Wrist extension - 5/5   Wrist flexion - 5/5    - 5/5   Finger extension - 5/5   Finger abduction - 5/5     Special Tests:  No laxity of the MCL at 0 and 30 degrees.    No laxity of the LCL at 0 and 30 degrees.    3rd digit extension resistance test - negative  Resisted supination - negative  Resisted pronation - negative  Resisted wrist extension (Cozen's test) - negative  Resisted wrist flexion - negative    Neurovascular Exam of Bilateral UEs:  Sensation to light touch intact in the distal median, radial, and ulnar nerve distributions bilaterally.  Negative Tinnels at carpal tunnel.  Negative Tinnels at cubital tunnel.      XRAY INTERPRETATION:   X-rays of left elbow dated 3/18/22 are personally reviewed and show no fracture or dislocation.         Assessment:       Encounter Diagnosis   Name Primary?    Lateral epicondylitis of left elbow           Plan:       Diagnoses and all orders for this visit:    Lateral epicondylitis of left elbow  -     Ambulatory referral/consult to Orthopedics  -     Ambulatory referral/consult to Physical/Occupational Therapy; Future      4 month history of intermittent left elbow pain that started after rolling over and hitting it on the wall. Pain is worse with lifting and pulling his bow back. He is right hand dominant.    Left elbow XRs show no fracture or dislocation. Exam consistent with lateral epicondylitis.     Treatment options reviewed with patient along with above left elbow xrays. Following plan made:     - PT/OT orders for left elbow sent to [facility] here.   - History of GI ulcers so would avoid most NSAIDS. Can take prn OTC tylenol prn.   - Given information for tennis elbow strap.   - Discussed injection, will consider if no improvement with above.      Follow up in about 6 weeks (around 4/29/2022).

## 2022-03-16 NOTE — TELEPHONE ENCOUNTER
----- Message from Meagan Borrego PA-C sent at 3/16/2022  3:01 PM CDT -----  He has appt with me on Friday and needs left elbow XRs prior to seeing me.     Thanks.

## 2022-03-18 ENCOUNTER — PATIENT MESSAGE (OUTPATIENT)
Dept: ADMINISTRATIVE | Facility: HOSPITAL | Age: 47
End: 2022-03-18
Payer: COMMERCIAL

## 2022-03-18 ENCOUNTER — OFFICE VISIT (OUTPATIENT)
Dept: ORTHOPEDICS | Facility: CLINIC | Age: 47
End: 2022-03-18
Payer: COMMERCIAL

## 2022-03-18 VITALS — BODY MASS INDEX: 23.48 KG/M2 | HEIGHT: 70 IN | WEIGHT: 164 LBS

## 2022-03-18 DIAGNOSIS — M77.12 LATERAL EPICONDYLITIS OF LEFT ELBOW: ICD-10-CM

## 2022-03-18 PROCEDURE — 99214 OFFICE O/P EST MOD 30 MIN: CPT | Mod: PBBFAC,PN | Performed by: PHYSICIAN ASSISTANT

## 2022-03-18 PROCEDURE — 99202 OFFICE O/P NEW SF 15 MIN: CPT | Mod: S$GLB,,, | Performed by: PHYSICIAN ASSISTANT

## 2022-03-18 PROCEDURE — 99999 PR PBB SHADOW E&M-EST. PATIENT-LVL IV: CPT | Mod: PBBFAC,,, | Performed by: PHYSICIAN ASSISTANT

## 2022-03-18 PROCEDURE — 99999 PR PBB SHADOW E&M-EST. PATIENT-LVL IV: ICD-10-PCS | Mod: PBBFAC,,, | Performed by: PHYSICIAN ASSISTANT

## 2022-03-18 PROCEDURE — 99202 PR OFFICE/OUTPT VISIT, NEW, LEVL II, 15-29 MIN: ICD-10-PCS | Mod: S$GLB,,, | Performed by: PHYSICIAN ASSISTANT

## 2022-03-18 NOTE — PATIENT INSTRUCTIONS
It was nice to meet you today! I am sorry that you are hurting so much.     You have tendonitis in your elbow and this is likely what is causing your pain.     You can get a tennis elbow strap to help- see paperwork.     Be careful with medications- I recommend you take over the counter tylenol as needed.    I sent physical therapy orders to [facility] here. They should call you to set up, but if not you can call [phone].     If no improvement with above, we can consider an injection    I will see you back in 6 weeks, but please stay in touch and call me if you need anything. You can also send me a message in [facility].     Meagan   [phone]

## 2022-03-22 DIAGNOSIS — Z12.11 SCREENING FOR COLON CANCER: ICD-10-CM

## 2022-04-07 ENCOUNTER — TELEPHONE (OUTPATIENT)
Dept: INTERNAL MEDICINE | Facility: CLINIC | Age: 47
End: 2022-04-07
Payer: COMMERCIAL

## 2022-04-07 DIAGNOSIS — R19.5 ABNORMAL STOOL TEST: Primary | ICD-10-CM

## 2022-04-07 LAB — HEMOCCULT STL QL IA: POSITIVE

## 2022-04-07 NOTE — TELEPHONE ENCOUNTER
Please inform patient that stool test is abnormal.  Recommend diagnostic colonoscopy.  Order has been entered.  Please provide phone number for endoscopy.

## 2022-04-08 ENCOUNTER — PATIENT MESSAGE (OUTPATIENT)
Dept: INTERNAL MEDICINE | Facility: CLINIC | Age: 47
End: 2022-04-08
Payer: COMMERCIAL

## 2022-06-10 ENCOUNTER — TELEPHONE (OUTPATIENT)
Dept: INTERNAL MEDICINE | Facility: CLINIC | Age: 47
End: 2022-06-10
Payer: COMMERCIAL

## 2022-06-10 NOTE — TELEPHONE ENCOUNTER
----- Message from Carolina Grant sent at 6/10/2022 12:55 PM CDT -----  Contact: pt [phone]  Patient states he would like a CT Scan,  Patients psychiatrist  suggested the scan. Please call and advise.    Thank you and have a great day.

## 2022-06-10 NOTE — TELEPHONE ENCOUNTER
Last visit 7/26/2021. No annual on file.  The patient scheduled to be seen with our nurse practitioner.  He then ask to see if he can have an appointment in [place]. I gave him  The scheduling department number.

## 2022-06-15 ENCOUNTER — LAB VISIT (OUTPATIENT)
Dept: LAB | Facility: HOSPITAL | Age: 47
End: 2022-06-15
Attending: NURSE PRACTITIONER
Payer: MEDICARE

## 2022-06-15 ENCOUNTER — OFFICE VISIT (OUTPATIENT)
Dept: INTERNAL MEDICINE | Facility: CLINIC | Age: 47
End: 2022-06-15
Payer: COMMERCIAL

## 2022-06-15 VITALS
OXYGEN SATURATION: 97 % | WEIGHT: 157.63 LBS | BODY MASS INDEX: 22.57 KG/M2 | SYSTOLIC BLOOD PRESSURE: 118 MMHG | RESPIRATION RATE: 15 BRPM | TEMPERATURE: 98 F | HEART RATE: 78 BPM | HEIGHT: 70 IN | DIASTOLIC BLOOD PRESSURE: 84 MMHG

## 2022-06-15 DIAGNOSIS — F41.1 GAD (GENERALIZED ANXIETY DISORDER): ICD-10-CM

## 2022-06-15 DIAGNOSIS — F90.9 ATTENTION DEFICIT HYPERACTIVITY DISORDER (ADHD), UNSPECIFIED ADHD TYPE: ICD-10-CM

## 2022-06-15 DIAGNOSIS — Z00.00 ENCOUNTER FOR ANNUAL HEALTH EXAMINATION: ICD-10-CM

## 2022-06-15 DIAGNOSIS — R61 NIGHT SWEATS: ICD-10-CM

## 2022-06-15 DIAGNOSIS — R41.3 OTHER AMNESIA: ICD-10-CM

## 2022-06-15 DIAGNOSIS — Z13.6 ENCOUNTER FOR SCREENING FOR CARDIOVASCULAR DISORDERS: ICD-10-CM

## 2022-06-15 DIAGNOSIS — Z00.00 ENCOUNTER FOR ANNUAL HEALTH EXAMINATION: Primary | ICD-10-CM

## 2022-06-15 DIAGNOSIS — F17.290 OTHER TOBACCO PRODUCT NICOTINE DEPENDENCE, UNCOMPLICATED: ICD-10-CM

## 2022-06-15 PROBLEM — F17.200 NICOTINE ADDICTION: Status: ACTIVE | Noted: 2022-06-15

## 2022-06-15 LAB
ALBUMIN SERPL BCP-MCNC: 4.2 G/DL (ref 3.5–5.2)
ALP SERPL-CCNC: 89 U/L (ref 55–135)
ALT SERPL W/O P-5'-P-CCNC: 23 U/L (ref 10–44)
ANION GAP SERPL CALC-SCNC: 6 MMOL/L (ref 8–16)
AST SERPL-CCNC: 21 U/L (ref 10–40)
BASOPHILS # BLD AUTO: 0.04 K/UL (ref 0–0.2)
BASOPHILS NFR BLD: 0.5 % (ref 0–1.9)
BILIRUB SERPL-MCNC: 0.4 MG/DL (ref 0.1–1)
BUN SERPL-MCNC: 8 MG/DL (ref 6–20)
CALCIUM SERPL-MCNC: 9.6 MG/DL (ref 8.7–10.5)
CHLORIDE SERPL-SCNC: 101 MMOL/L (ref 95–110)
CHOLEST SERPL-MCNC: 153 MG/DL (ref 120–199)
CHOLEST/HDLC SERPL: 4.1 {RATIO} (ref 2–5)
CO2 SERPL-SCNC: 29 MMOL/L (ref 23–29)
CREAT SERPL-MCNC: 0.9 MG/DL (ref 0.5–1.4)
DIFFERENTIAL METHOD: ABNORMAL
EOSINOPHIL # BLD AUTO: 0.3 K/UL (ref 0–0.5)
EOSINOPHIL NFR BLD: 4 % (ref 0–8)
ERYTHROCYTE [DISTWIDTH] IN BLOOD BY AUTOMATED COUNT: 13.2 % (ref 11.5–14.5)
EST. GFR  (AFRICAN AMERICAN): >60 ML/MIN/1.73 M^2
EST. GFR  (NON AFRICAN AMERICAN): >60 ML/MIN/1.73 M^2
GLUCOSE SERPL-MCNC: 91 MG/DL (ref 70–110)
HCT VFR BLD AUTO: 46.5 % (ref 40–54)
HDLC SERPL-MCNC: 37 MG/DL (ref 40–75)
HDLC SERPL: 24.2 % (ref 20–50)
HGB BLD-MCNC: 15.3 G/DL (ref 14–18)
IMM GRANULOCYTES # BLD AUTO: 0.03 K/UL (ref 0–0.04)
IMM GRANULOCYTES NFR BLD AUTO: 0.4 % (ref 0–0.5)
LDLC SERPL CALC-MCNC: 79.6 MG/DL (ref 63–159)
LYMPHOCYTES # BLD AUTO: 1.5 K/UL (ref 1–4.8)
LYMPHOCYTES NFR BLD: 19.8 % (ref 18–48)
MCH RBC QN AUTO: 32.1 PG (ref 27–31)
MCHC RBC AUTO-ENTMCNC: 32.9 G/DL (ref 32–36)
MCV RBC AUTO: 98 FL (ref 82–98)
MONOCYTES # BLD AUTO: 0.6 K/UL (ref 0.3–1)
MONOCYTES NFR BLD: 7.7 % (ref 4–15)
NEUTROPHILS # BLD AUTO: 5.3 K/UL (ref 1.8–7.7)
NEUTROPHILS NFR BLD: 67.6 % (ref 38–73)
NONHDLC SERPL-MCNC: 116 MG/DL
NRBC BLD-RTO: 0 /100 WBC
PLATELET # BLD AUTO: 235 K/UL (ref 150–450)
PMV BLD AUTO: 11.2 FL (ref 9.2–12.9)
POTASSIUM SERPL-SCNC: 4.5 MMOL/L (ref 3.5–5.1)
PROT SERPL-MCNC: 6.9 G/DL (ref 6–8.4)
RBC # BLD AUTO: 4.77 M/UL (ref 4.6–6.2)
SODIUM SERPL-SCNC: 136 MMOL/L (ref 136–145)
TESTOST SERPL-MCNC: 173 NG/DL (ref 304–1227)
TRIGL SERPL-MCNC: 182 MG/DL (ref 30–150)
TSH SERPL DL<=0.005 MIU/L-ACNC: 0.72 UIU/ML (ref 0.4–4)
VIT B12 SERPL-MCNC: 218 PG/ML (ref 210–950)
WBC # BLD AUTO: 7.77 K/UL (ref 3.9–12.7)

## 2022-06-15 PROCEDURE — 86803 HEPATITIS C AB TEST: CPT | Performed by: NURSE PRACTITIONER

## 2022-06-15 PROCEDURE — 99999 PR PBB SHADOW E&M-EST. PATIENT-LVL IV: ICD-10-PCS | Mod: PBBFAC,,, | Performed by: NURSE PRACTITIONER

## 2022-06-15 PROCEDURE — 99214 OFFICE O/P EST MOD 30 MIN: CPT | Mod: PBBFAC,PO | Performed by: NURSE PRACTITIONER

## 2022-06-15 PROCEDURE — 87389 HIV-1 AG W/HIV-1&-2 AB AG IA: CPT | Performed by: NURSE PRACTITIONER

## 2022-06-15 PROCEDURE — 84443 ASSAY THYROID STIM HORMONE: CPT | Performed by: NURSE PRACTITIONER

## 2022-06-15 PROCEDURE — 82607 VITAMIN B-12: CPT | Performed by: NURSE PRACTITIONER

## 2022-06-15 PROCEDURE — 80061 LIPID PANEL: CPT | Performed by: NURSE PRACTITIONER

## 2022-06-15 PROCEDURE — 99396 PREV VISIT EST AGE 40-64: CPT | Mod: S$PBB,GY,, | Performed by: NURSE PRACTITIONER

## 2022-06-15 PROCEDURE — 85025 COMPLETE CBC W/AUTO DIFF WBC: CPT | Performed by: NURSE PRACTITIONER

## 2022-06-15 PROCEDURE — 80053 COMPREHEN METABOLIC PANEL: CPT | Performed by: NURSE PRACTITIONER

## 2022-06-15 PROCEDURE — 36415 COLL VENOUS BLD VENIPUNCTURE: CPT | Mod: PO | Performed by: NURSE PRACTITIONER

## 2022-06-15 PROCEDURE — 99999 PR PBB SHADOW E&M-EST. PATIENT-LVL IV: CPT | Mod: PBBFAC,,, | Performed by: NURSE PRACTITIONER

## 2022-06-15 PROCEDURE — 84403 ASSAY OF TOTAL TESTOSTERONE: CPT | Performed by: NURSE PRACTITIONER

## 2022-06-15 PROCEDURE — 99396 PR PREVENTIVE VISIT,EST,40-64: ICD-10-PCS | Mod: S$PBB,GY,, | Performed by: NURSE PRACTITIONER

## 2022-06-15 NOTE — PROGRESS NOTES
Subjective:       Patient ID: Luis Daniel Wynne Jr. is a 46 y.o. male.    Chief Complaint: Annual Wellness Visit      Luis Daniel Wynne Jr. is a 46 y.o. male who presents today for an annual wellness visit.     Complains of diminished taste/smell. Negative home COVID tests.   Night sweats, decreased appetite, not sleeping well.   Notes episode of memory loss. States his psychiatrist wants him to have brain imaging for evaluation.   Patient has family history of brain tumors and suffered a head injury in childhood.   Describes memory loss as:  He took his neighbor out frogging and she asked how they were doing since his dog was put down. He brushed her off stating we didn't put the dog down. He went home to his wife and mentioned to her what the neighbor said and she began to cry.   She told him that yes they had put the dog down --- it was after this that he remembered taking the dog to the vet to have her euthanized.   States another occurrence was a friend had invited him over and he can see the messages of him being invited in/over but he does not recall having seen/been to that friends house.     Review of patient's allergies indicates:  No Known Allergies     Medication List with Changes/Refills   Current Medications    DEXTROAMPHETAMINE-AMPHETAMINE (ADDERALL XR) 20 MG 24 HR CAPSULE    TK ONE C PO  BID    EPINEPHRINE (EPIPEN) 0.3 MG/0.3 ML ATIN    Inject 0.3 mLs (0.3 mg total) into the muscle once.    IBUPROFEN (ADVIL,MOTRIN) 800 MG TABLET    Take 1 tablet (800 mg total) by mouth 3 (three) times daily as needed for Pain.    VENLAFAXINE (EFFEXOR-XR) 150 MG CP24           Health Maintenance    Occult Blood/Cologuard: 03/31/2022 (+)  Hepatitis C Screen: 06/15/2022   HIV Screen: 06/15/2022     How would you described your general health: Good    Patient ambulates on his own without an assistive device.     On average, how many days per week do you do moderate to strenuous exercise:  (like a brisk walk or jog;  "this does not include your job/work)  0     On average, how many minutes do you exercise at this level each day? 0    Do you eat fruits and vegetable every day?  Yes    Do you always wear your seat belt in the car?  Yes    Do you Text while driving? No    Have you ever used tobacco products? Yes    Have you ever been screened for HIV? No  Would you like to be screened for HIV? Yes    Do you go to the dentist regularly? Yes  When was you last exam:     Do you go to the eye doctor regularly? Yes  When was your last exam:    Do you have any personal or family history of breast/ovarian/colon cancer?  Yes       If yes, who?    Do you have any personal or family history of heart disease? Yes       If yes, who?    Have you often been bothered by feeling down, depressed, or hopeless?  Not at all    Have you often been bothered by having little interest or pleasure in doing things?  Not at all    How often do you drink alcohol?  Never    How often have you used Marijuana or other illicit drugs in the past year?  daily or almost daily      Review of Systems   Constitutional:        Night Sweats  Poor appetite   HENT:        Loss of taste/smell   Psychiatric/Behavioral: Positive for memory loss. The patient has insomnia.      Objective:     /84   Pulse 78   Temp 97.9 °F (36.6 °C) (Other (see comments))   Resp 15   Ht 5' 10" (1.778 m)   Wt 71.5 kg (157 lb 10.1 oz)   SpO2 97%   BMI 22.62 kg/m²     Physical Exam  Vitals reviewed.   Constitutional:       Appearance: Normal appearance.   HENT:      Head: Normocephalic and atraumatic.   Eyes:      Conjunctiva/sclera: Conjunctivae normal.      Pupils: Pupils are equal, round, and reactive to light.   Cardiovascular:      Rate and Rhythm: Normal rate and regular rhythm.      Heart sounds: Normal heart sounds. No murmur heard.  Pulmonary:      Effort: Pulmonary effort is normal.      Breath sounds: Normal breath sounds. No wheezing.   Skin:     General: Skin is warm and " dry.   Neurological:      Mental Status: He is alert and oriented to person, place, and time.      Cranial Nerves: No cranial nerve deficit (CN II - XII grossly intact).       BP Readings from Last 3 Encounters:   06/15/22 118/84   09/09/19 108/72   06/07/19 118/74     Wt Readings from Last 3 Encounters:   06/15/22 71.5 kg (157 lb 10.1 oz)   03/18/22 74.4 kg (164 lb)   09/09/19 75 kg (165 lb 5.5 oz)       Last Labs:  Glucose   Date Value Ref Range Status   06/14/2019 100 70 - 110 mg/dL Final   12/01/2011 92 70 - 110 mg/dl Final     BUN   Date Value Ref Range Status   06/14/2019 14 6 - 20 mg/dL Final   12/01/2011 12 6 - 20 mg/dl Final     Creatinine   Date Value Ref Range Status   06/14/2019 1.0 0.5 - 1.4 mg/dL Final   12/01/2011 0.8 0.5 - 1.4 mg/dl Final     Cholesterol   Date Value Ref Range Status   06/14/2019 176 120 - 199 mg/dL Final     Comment:     The National Cholesterol Education Program (NCEP) has set the  following guidelines (reference ranges) for Cholesterol:  Optimal.....................<200 mg/dL  Borderline High.............200-239 mg/dL  High........................> or = 240 mg/dL     11/16/2007 152 120 - 199 mg/dL Final     Comment:     The National Cholesterol Education Program (NCEP) has set the  following guidelines (reference ranges) for Cholesterol:  Desirable...................<200 mg/dL  Borderline High.............200-239 mg/dL  High........................> or = 240 mg/dL       Hemoglobin A1C   Date Value Ref Range Status   12/01/2011 5.0 4.0 - 6.2 % Final     Hemoglobin   Date Value Ref Range Status   06/14/2019 14.7 14.0 - 18.0 g/dL Final   11/16/2007 15.8 14.0 - 18.0 gm/dl Final     Hematocrit   Date Value Ref Range Status   06/14/2019 43.0 40.0 - 54.0 % Final   11/16/2007 46.2 40.0 - 54.0 % Final     I have reviewed the following:     Details / Date    [x]   Labs     []   Micro     []   Pathology     []   Imaging     []   Cardiology Procedures     [x]   Other  Patient's Medical and  Surgical History        Assessment and Plan:     1. Encounter for annual health examination    --Immunizations reviewed.  --Discussed benefits of maintaining up to date health maintenance.    - CBC Auto Differential; Future  - Comprehensive Metabolic Panel; Future  - Lipid Panel; Future  - TSH; Future  - HIV 1/2 Ag/Ab (4th Gen); Future  - Hepatitis C Antibody; Future  - Vitamin B12; Future  - Testosterone; Future    2. Other amnesia    - CBC Auto Differential; Future  - TSH; Future  - Vitamin B12; Future  - CT Head Without Contrast; Future    3. Night sweats    - CBC Auto Differential; Future    4. Attention deficit hyperactivity disorder (ADHD), unspecified ADHD type  5. DAVY (generalized anxiety disorder)    Chronic, stable?, follow up with Psych     7. Other tobacco product nicotine dependence, uncomplicated                 -- Cessation was discussed with patient.

## 2022-06-16 LAB
HCV AB SERPL QL IA: NEGATIVE
HIV 1+2 AB+HIV1 P24 AG SERPL QL IA: NEGATIVE

## 2022-06-21 ENCOUNTER — TELEPHONE (OUTPATIENT)
Dept: INTERNAL MEDICINE | Facility: CLINIC | Age: 47
End: 2022-06-21
Payer: COMMERCIAL

## 2022-06-21 NOTE — TELEPHONE ENCOUNTER
----- Message from Priyanka Cunningham NP sent at 6/20/2022  9:19 AM CDT -----  Please advise patient that his CT of the head was negative. His testosterone is low. Otherwise his labs are unremarkable. Would he like a referral to Urology for the low T?

## 2022-06-22 NOTE — TELEPHONE ENCOUNTER
Spoke to pt and he was given his results of ct and labs.  Patient would like to wait on  The referral at this time .

## 2022-07-06 ENCOUNTER — TELEPHONE (OUTPATIENT)
Dept: ENDOSCOPY | Facility: HOSPITAL | Age: 47
End: 2022-07-06
Payer: COMMERCIAL

## 2022-07-06 NOTE — TELEPHONE ENCOUNTER
Received request that patient would like to schedule in [place] with male doctor, called and offered next available 08/25/2022 declined states was too far out will call another facility

## 2022-07-07 DIAGNOSIS — Z01.818 PRE-OP TESTING: ICD-10-CM

## 2022-07-07 DIAGNOSIS — Z12.11 SPECIAL SCREENING FOR MALIGNANT NEOPLASMS, COLON: Primary | ICD-10-CM

## 2022-07-07 RX ORDER — POLYETHYLENE GLYCOL 3350, SODIUM SULFATE ANHYDROUS, SODIUM BICARBONATE, SODIUM CHLORIDE, POTASSIUM CHLORIDE 236; 22.74; 6.74; 5.86; 2.97 G/4L; G/4L; G/4L; G/4L; G/4L
4 POWDER, FOR SOLUTION ORAL ONCE
Qty: 4000 ML | Refills: 0 | Status: SHIPPED | OUTPATIENT
Start: 2022-07-07 | End: 2022-07-07

## 2022-07-11 ENCOUNTER — TELEPHONE (OUTPATIENT)
Dept: INTERNAL MEDICINE | Facility: CLINIC | Age: 47
End: 2022-07-11
Payer: COMMERCIAL

## 2022-07-11 ENCOUNTER — PATIENT MESSAGE (OUTPATIENT)
Dept: INTERNAL MEDICINE | Facility: CLINIC | Age: 47
End: 2022-07-11
Payer: COMMERCIAL

## 2022-07-11 NOTE — TELEPHONE ENCOUNTER
With Provider: Zenobia Dunbar MD [[facility]]      Preferred Date Range: Any      Preferred Times: Any Time      Reason for visit: Office Visit      Comments:   I'm experiencing memory loss and I'm not sleeping.

## 2022-07-25 ENCOUNTER — OFFICE VISIT (OUTPATIENT)
Dept: INTERNAL MEDICINE | Facility: CLINIC | Age: 47
End: 2022-07-25
Payer: COMMERCIAL

## 2022-07-25 VITALS
DIASTOLIC BLOOD PRESSURE: 84 MMHG | WEIGHT: 159.5 LBS | OXYGEN SATURATION: 96 % | BODY MASS INDEX: 22.83 KG/M2 | TEMPERATURE: 98 F | HEIGHT: 70 IN | SYSTOLIC BLOOD PRESSURE: 112 MMHG | HEART RATE: 87 BPM

## 2022-07-25 DIAGNOSIS — R79.89 LOW TESTOSTERONE: Primary | ICD-10-CM

## 2022-07-25 DIAGNOSIS — R41.3 MEMORY LOSS: ICD-10-CM

## 2022-07-25 PROCEDURE — 99215 OFFICE O/P EST HI 40 MIN: CPT | Mod: PBBFAC,PO | Performed by: INTERNAL MEDICINE

## 2022-07-25 PROCEDURE — 99999 PR PBB SHADOW E&M-EST. PATIENT-LVL V: CPT | Mod: PBBFAC,,, | Performed by: INTERNAL MEDICINE

## 2022-07-25 PROCEDURE — 99213 PR OFFICE/OUTPT VISIT, EST, LEVL III, 20-29 MIN: ICD-10-PCS | Mod: S$GLB,,, | Performed by: INTERNAL MEDICINE

## 2022-07-25 PROCEDURE — 99213 OFFICE O/P EST LOW 20 MIN: CPT | Mod: S$GLB,,, | Performed by: INTERNAL MEDICINE

## 2022-07-25 PROCEDURE — 99999 PR PBB SHADOW E&M-EST. PATIENT-LVL V: ICD-10-PCS | Mod: PBBFAC,,, | Performed by: INTERNAL MEDICINE

## 2022-07-25 NOTE — PROGRESS NOTES
CC: insomnia and memory loss  HPI:  The patient is a 46 y.o. year old male who presents to the office for insomnia and memory loss.  His symptoms started after developing night sweats.  The patient learned he has low testosterone.  He is under a lot of stress, [place]  He reports he has not been sleeping well, and has had difficulty remembering things that he normally remembers.  He also reports multiple episodes of head trauma.    PAST MEDICAL HISTORY:  Past Medical History:   Diagnosis Date    Depression     Fibromyalgia     Peptic ulcer     Scoliosis        SURGICAL HISTORY:  Past Surgical History:   Procedure Laterality Date    KNEE ARTHROSCOPY         MEDS:  Medcard reviewed and updated    ALLERGIES: Allergy Card reviewed and updated    SOCIAL HISTORY:   The patient is a nonsmoker.    PE:   APPEARANCE: Well nourished, well developed, in no acute distress.    EYES: Sclerae anicteric. PERRL. EOMI.      EARS: TM's intact. No retraction or perforation.    NOSE: Mucosa pink. Airway clear.  MOUTH & THROAT: No tonsillar enlargement. No pharyngeal erythema or exudate. No stridor.  CHEST: Lungs clear to auscultation with unlabored respirations.  CARDIOVASCULAR: Normal S1, S2. No murmurs. No carotid bruits. No pedal edema.  ABDOMEN: Bowel sounds normal. Not distended. Soft. No tenderness or masses.   NEUROLOGIC: Cranial Nerves: Intact.  PSYCHIATRIC: The patient is oriented to person, place, and time and has a pleasant affect.        ASSESSMENT/PLAN:  Luis Daniel was seen today for insomnia, memory loss and lactose intolerance.    Diagnoses and all orders for this visit:    Low testosterone  -     Ambulatory referral/consult to Urology; Future    Memory loss  -     Ambulatory referral/consult to Neurology; Future

## 2022-07-28 ENCOUNTER — OFFICE VISIT (OUTPATIENT)
Dept: UROLOGY | Facility: CLINIC | Age: 47
End: 2022-07-28
Payer: MEDICARE

## 2022-07-28 ENCOUNTER — TELEPHONE (OUTPATIENT)
Dept: UROLOGY | Facility: CLINIC | Age: 47
End: 2022-07-28
Payer: COMMERCIAL

## 2022-07-28 VITALS
HEART RATE: 107 BPM | DIASTOLIC BLOOD PRESSURE: 87 MMHG | SYSTOLIC BLOOD PRESSURE: 130 MMHG | BODY MASS INDEX: 22.05 KG/M2 | HEIGHT: 70 IN | WEIGHT: 154 LBS

## 2022-07-28 DIAGNOSIS — R68.82 DECREASED LIBIDO: ICD-10-CM

## 2022-07-28 PROCEDURE — 99999 PR PBB SHADOW E&M-EST. PATIENT-LVL IV: ICD-10-PCS | Mod: PBBFAC,,, | Performed by: UROLOGY

## 2022-07-28 PROCEDURE — 99999 PR PBB SHADOW E&M-EST. PATIENT-LVL IV: CPT | Mod: PBBFAC,,, | Performed by: UROLOGY

## 2022-07-28 PROCEDURE — 99203 PR OFFICE/OUTPT VISIT, NEW, LEVL III, 30-44 MIN: ICD-10-PCS | Mod: S$GLB,,, | Performed by: UROLOGY

## 2022-07-28 PROCEDURE — 99203 OFFICE O/P NEW LOW 30 MIN: CPT | Mod: S$GLB,,, | Performed by: UROLOGY

## 2022-07-28 PROCEDURE — 99214 OFFICE O/P EST MOD 30 MIN: CPT | Mod: PBBFAC | Performed by: UROLOGY

## 2022-07-28 NOTE — PROGRESS NOTES
CHIEF COMPLAINT:    Mr. Wynne is a 46 y.o. male presenting for a consultation at the request of Dr. Dunbar. Patient presents with fatigue, low libido.    PRESENTING ILLNESS:    Luis Daniel Wynne Jr. is a 46 y.o. male c/o with fatigue, low libido. A T was drawn x 1 that is low.    He denies ED.    He has LUTS. Nocturia x 3.  Is pleased with how he voids.    REVIEW OF SYSTEMS:    Luis Daniel Wynne Jr. endorses night sweats. Denies headache, blurred vision, fever, nausea, vomiting, chills, abdominal pain, chest pain, sore throat, bleeding per rectum, cough, SOB, recent loss of consciousness, recent mental status changes, seizures, dizziness, or upper or lower extremity weakness.    ASA  1. 2  2. 5  3. 5  4. 5  5. 5      PATIENT HISTORY:    Past Medical History:   Diagnosis Date    Depression     Fibromyalgia     Peptic ulcer     Scoliosis        Past Surgical History:   Procedure Laterality Date    KNEE ARTHROSCOPY         Family History   Problem Relation Age of Onset    No Known Problems Father     No Known Problems Mother     Cancer Other     Diabetes Other     Heart disease Neg Hx     Hypertension Neg Hx        Social History     Socioeconomic History    Marital status:    Tobacco Use    Smoking status: Former Smoker     Packs/day: 0.50     Types: Cigarettes     Quit date: 2016     Years since quittin.9    Smokeless tobacco: Current User   Substance and Sexual Activity    Alcohol use: Never    Drug use: Yes     Types: Marijuana    Sexual activity: Yes     Partners: Female       Allergies:  Patient has no known allergies.    Medications:    Current Outpatient Medications:     dextroamphetamine-amphetamine (ADDERALL XR) 20 MG 24 hr capsule, TK ONE C PO  BID, Disp: , Rfl: 0    ibuprofen (ADVIL,MOTRIN) 800 MG tablet, Take 1 tablet (800 mg total) by mouth 3 (three) times daily as needed for Pain. (Patient taking differently: Take 800 mg by mouth as needed for Pain.), Disp: 30  tablet, Rfl: 3    venlafaxine (EFFEXOR-XR) 150 MG Cp24, , Disp: , Rfl:     EPINEPHrine (EPIPEN) 0.3 mg/0.3 mL AtIn, Inject 0.3 mLs (0.3 mg total) into the muscle once., Disp: 1 each, Rfl: 3    PHYSICAL EXAMINATION:    The patient generally appears in good health, is appropriately interactive, and is in no apparent distress.     Eyes: anicteric sclerae, moist conjunctivae; no lid-lag; PERRLA     HENT: Atraumatic; oropharynx clear with moist mucous membranes and no mucosal ulcerations;normal hard and soft palate.  No evidence of lymphadenopathy.    Neck: Trachea midline.  No thyromegaly.    Skin: No lesions.    Mental: Cooperative with normal affect.  Is oriented to time, place, and person.    Neuro: Grossly intact.    Chest: Normal inspiratory effort.   No accessory muscles.  No audible wheezes.  Respirations symmetric on inspiration and expiration.    Heart: Regular rhythm.      Abdomen:  Soft, non-tender. No masses or organomegaly. Bladder is not palpable. No evidence of flank discomfort. No evidence of inguinal hernia.    Genitourinary: The penis is not circumcised with no evidence of plaques or induration. The urethral meatus is normal. The testes, epididymides, and cord structures are normal in size and contour bilaterally. The scrotum is normal in size and contour.    Extremities: No clubbing, cyanosis, or edema      LABS:      No results found for: PSA, PSADIAG, PSATOTAL, PSAFREE, PSAFREEPCT    IMPRESSION:    Encounter Diagnoses   Name Primary?    Decreased libido          PLAN:    1. Will confirm the low T with an AM draw.  Will also draw LH and prolactin.  2. Will base his follow up off the T.    Copy to: Bettina

## 2022-07-28 NOTE — LETTER
July 28, 2022        Zenobia Dunbar MD  [address]  [place] [address]             [facility] 4th Fl  [address]  [place] [address]  Phone: [phone]   Patient: Luis Daniel Wynne Jr.   MR Number: [id number]   YOB: 1975   Date of Visit: 7/28/2022       Dear Dr. Dunbar:    Thank you for referring Luis Daniel Wynne to me for evaluation. Attached you will find relevant portions of my assessment and plan of care.    If you have questions, please do not hesitate to call me. I look forward to following Luis Daniel Wynne along with you.    Sincerely,      iLnwood Abdi MD            CC  No Recipients    Enclosure

## 2022-08-01 ENCOUNTER — TELEPHONE (OUTPATIENT)
Dept: UROLOGY | Facility: CLINIC | Age: 47
End: 2022-08-01
Payer: COMMERCIAL

## 2022-08-01 NOTE — TELEPHONE ENCOUNTER
Call placed to patient. Name and date of birth verified. Patient informed of the following:    Please notify T is low. He should come in to discuss.  -Dr. Abdi    Patient appointment scheduled and noted in epic. Patient verbalized understanding.

## 2022-08-03 ENCOUNTER — OFFICE VISIT (OUTPATIENT)
Dept: UROLOGY | Facility: CLINIC | Age: 47
End: 2022-08-03
Payer: COMMERCIAL

## 2022-08-03 VITALS
BODY MASS INDEX: 22.33 KG/M2 | SYSTOLIC BLOOD PRESSURE: 117 MMHG | HEART RATE: 84 BPM | HEIGHT: 70 IN | DIASTOLIC BLOOD PRESSURE: 83 MMHG | WEIGHT: 156 LBS

## 2022-08-03 DIAGNOSIS — N40.1 BPH WITH URINARY OBSTRUCTION: ICD-10-CM

## 2022-08-03 DIAGNOSIS — N13.8 BPH WITH URINARY OBSTRUCTION: ICD-10-CM

## 2022-08-03 DIAGNOSIS — Z79.899 ENCOUNTER FOR LONG-TERM (CURRENT) USE OF HIGH-RISK MEDICATION: ICD-10-CM

## 2022-08-03 DIAGNOSIS — E29.1 MALE HYPOGONADISM: Primary | ICD-10-CM

## 2022-08-03 PROCEDURE — 99214 PR OFFICE/OUTPT VISIT, EST, LEVL IV, 30-39 MIN: ICD-10-PCS | Mod: S$GLB,,, | Performed by: UROLOGY

## 2022-08-03 PROCEDURE — 99214 OFFICE O/P EST MOD 30 MIN: CPT | Mod: PBBFAC | Performed by: UROLOGY

## 2022-08-03 PROCEDURE — 99214 OFFICE O/P EST MOD 30 MIN: CPT | Mod: S$GLB,,, | Performed by: UROLOGY

## 2022-08-03 PROCEDURE — 99999 PR PBB SHADOW E&M-EST. PATIENT-LVL IV: ICD-10-PCS | Mod: PBBFAC,,, | Performed by: UROLOGY

## 2022-08-03 PROCEDURE — 99999 PR PBB SHADOW E&M-EST. PATIENT-LVL IV: CPT | Mod: PBBFAC,,, | Performed by: UROLOGY

## 2022-08-03 RX ORDER — TESTOSTERONE 20.25 MG/1.25G
GEL TOPICAL
Qty: 1 EACH | Refills: 5 | Status: SHIPPED | OUTPATIENT
Start: 2022-08-03 | End: 2023-09-30

## 2022-08-03 NOTE — PROGRESS NOTES
CHIEF COMPLAINT:    Mr. Wynne is a 46 y.o. male presenting with hypogonadism    PRESENTING ILLNESS:    Luis Daniel Wynne Jr. is a 46 y.o. male c/o with fatigue, low libido.  He has hypogonadism.  Is here to discuss.  He denies ED.    He has LUTS. Nocturia x 3.  Is pleased with how he voids.    REVIEW OF SYSTEMS:    Luis Daniel Wynne Jr. endorses night sweats. Denies headache, blurred vision, fever, nausea, vomiting, chills, abdominal pain, chest pain, sore throat, bleeding per rectum, cough, SOB, recent loss of consciousness, recent mental status changes, seizures, dizziness, or upper or lower extremity weakness.    ASA  1. 2  2. 5  3. 5  4. 5  5. 5      PATIENT HISTORY:    Past Medical History:   Diagnosis Date    Depression     Fibromyalgia     Peptic ulcer     Scoliosis        Past Surgical History:   Procedure Laterality Date    KNEE ARTHROSCOPY         Family History   Problem Relation Age of Onset    No Known Problems Mother     No Known Problems Father     Heart disease Neg Hx     Hypertension Neg Hx        Social History     Socioeconomic History    Marital status:    Tobacco Use    Smoking status: Former Smoker     Packs/day: 0.50     Types: Cigarettes     Quit date: 2016     Years since quittin.9    Smokeless tobacco: Current User   Substance and Sexual Activity    Alcohol use: Never    Drug use: Yes     Types: Marijuana    Sexual activity: Yes     Partners: Female       Allergies:  Patient has no known allergies.    Medications:    Current Outpatient Medications:     dextroamphetamine-amphetamine (ADDERALL XR) 20 MG 24 hr capsule, TK ONE C PO  BID, Disp: , Rfl: 0    ibuprofen (ADVIL,MOTRIN) 800 MG tablet, Take 1 tablet (800 mg total) by mouth 3 (three) times daily as needed for Pain. (Patient taking differently: Take 800 mg by mouth as needed for Pain.), Disp: 30 tablet, Rfl: 3    venlafaxine (EFFEXOR-XR) 150 MG Cp24, , Disp: , Rfl:     EPINEPHrine (EPIPEN) 0.3 mg/0.3 mL AtIn,  Inject 0.3 mLs (0.3 mg total) into the muscle once., Disp: 1 each, Rfl: 3    PHYSICAL EXAMINATION:    The patient generally appears in good health, is appropriately interactive, and is in no apparent distress.     Eyes: anicteric sclerae, moist conjunctivae; no lid-lag; PERRLA     HENT: Atraumatic; oropharynx clear with moist mucous membranes and no mucosal ulcerations;normal hard and soft palate.  No evidence of lymphadenopathy.    Neck: Trachea midline.  No thyromegaly.    Skin: No lesions.    Mental: Cooperative with normal affect.  Is oriented to time, place, and person.    Neuro: Grossly intact.    Chest: Normal inspiratory effort.   No accessory muscles.  No audible wheezes.  Respirations symmetric on inspiration and expiration.    Heart: Regular rhythm.      Abdomen:  Soft, non-tender. No masses or organomegaly. Bladder is not palpable. No evidence of flank discomfort. No evidence of inguinal hernia.    Genitourinary: The penis is not circumcised with no evidence of plaques or induration. The urethral meatus is normal. The testes, epididymides, and cord structures are normal in size and contour bilaterally. The scrotum is normal in size and contour.    Extremities: No clubbing, cyanosis, or edema      LABS:      No results found for: PSA, PSADIAG, PSATOTAL, PSAFREE, PSAFREEPCT    IMPRESSION:    Encounter Diagnoses   Name Primary?    Male hypogonadism Yes         PLAN:    1. Discussed the risks and benefits of testosterone replacement today.  This included possible cardiac risks.  He would like to try this.  Will check a T in 2 weeks and adjust the dose of TRT if necessary.  He can then RTC 3 months with T, PSA, CBC, hepatic panel, lipid panel.  A new Rx for Androgel 1.62% was given today.      Copy to:

## 2022-08-22 ENCOUNTER — OFFICE VISIT (OUTPATIENT)
Dept: NEUROLOGY | Facility: CLINIC | Age: 47
End: 2022-08-22
Payer: COMMERCIAL

## 2022-08-22 VITALS
HEART RATE: 88 BPM | SYSTOLIC BLOOD PRESSURE: 104 MMHG | HEIGHT: 71 IN | WEIGHT: 158.06 LBS | RESPIRATION RATE: 16 BRPM | BODY MASS INDEX: 22.13 KG/M2 | DIASTOLIC BLOOD PRESSURE: 78 MMHG

## 2022-08-22 DIAGNOSIS — R41.3 MEMORY LOSS: ICD-10-CM

## 2022-08-22 DIAGNOSIS — E53.8 B12 DEFICIENCY: Primary | ICD-10-CM

## 2022-08-22 DIAGNOSIS — F12.90 MARIJUANA USE: ICD-10-CM

## 2022-08-22 PROCEDURE — 99214 OFFICE O/P EST MOD 30 MIN: CPT | Mod: PBBFAC | Performed by: PSYCHIATRY & NEUROLOGY

## 2022-08-22 PROCEDURE — 99999 PR STA SHADOW: CPT | Mod: PBBFAC,,, | Performed by: PSYCHIATRY & NEUROLOGY

## 2022-08-22 PROCEDURE — 99999 PR PBB SHADOW E&M-EST. PATIENT-LVL IV: CPT | Mod: PBBFAC,,, | Performed by: PSYCHIATRY & NEUROLOGY

## 2022-08-22 PROCEDURE — 99999 PR STA SHADOW: ICD-10-PCS | Mod: PBBFAC,,, | Performed by: PSYCHIATRY & NEUROLOGY

## 2022-08-22 PROCEDURE — 99204 OFFICE O/P NEW MOD 45 MIN: CPT | Mod: S$PBB | Performed by: PSYCHIATRY & NEUROLOGY

## 2022-08-22 NOTE — PROGRESS NOTES
"Consult from Dr. Dunbar    HPI: Luis Daniel Wynne Jr. is a 46 y.o. male with a history of memory problems first noted by patient. The symptoms started in 6/2022.   Examples of memory problems noted include?   First example was after he went frogging in 6/2022 and had been up all night which is not unusual for him. He went to bed and asked her what day it was and then he slept "for 1-2 days" and he does not remember much about that. He states he may have gotten up and eat but is not sure.   He woke up and felt fine and asked where his best friend lives and asked what he did yesterday and did not remember his dad dying. He felt like his smell and taste were off. Over a week his memory started to improve but not fully    He felt he has stayed in a fog and felt out of balance.    He did not look unusual to others but felt it.    About a week later, he suffered another episode. He work up feeling off and having his memory challenged. He was not able to sleep well the night prior to this. He is able to tell friends and family his memory is off and he can't see well or taste well    This occurred 4-5 times over 1-2 weeks in episodes. He feels normal again between episodes.    No manic symptoms noted during this time    Currently, he feels 100% improved with memory but states his smell/taste (smells a chemical smell) and vision bilaterally (right more than left and like a fog)are still impaired.    He is being treated for Low T with urology.     Prior to the symptoms of memory loss he was noticing the room he was sleeping in at his house had a strong chemical spell with a window unit (Freon) and he stopped sleeping in there and symptoms continued in episodes despite him sleeping elsewhere.       He did not have Covid by history/ no sick contacts/ did not test.    No new medication    He drinks alcohol    He smokes THC daily but not changed in frequency over years    He is disabled due to "fibromyalgia and scoliosis/ " "spine pain" diagnoses. Not on any opiates and has a remote history of opiate addiction    Driving and functioning well    No personal history seizures    Level of education completed is college and was working as a  for [employer]    Mood is described as some anxiety relieved with effexor. The patient does not have any delusions, hallucinations, paranoia,  falls, or tremors.    Reports some remote head trauma requiring stitches as a child. Likely a some concussion in high school football     There is not a prior history of stroke. There is not a Family History of memory disorders.     Prior brain imaging or memory labs work-up? PCP performed a CT head and labs.      to wife of 10 years      Review of Systems   Constitutional: Negative for fever.   HENT: Negative for nosebleeds.    Eyes: Negative for double vision.   Respiratory: Negative for hemoptysis.    Cardiovascular: Negative for leg swelling.   Genitourinary: Negative for hematuria.   Musculoskeletal: Positive for myalgias.   Skin: Negative for rash.   Neurological: Negative for tremors.   Endo/Heme/Allergies: Does not bruise/bleed easily.   Psychiatric/Behavioral: Positive for memory loss.         I have reviewed all of this patient's past medical and surgical histories as well as family and social histories and active allergies and medications as documented in the electronic medical record.        Exam:  Gen Appearance, well developed/nourished in no apparent distress  CV: 2+ distal pulses with no edema or swelling  Neuro:  MS: Awake, alert, oriented to place, person, time, situation. Sustains attention. Recent/remote memory intact, Language is full to spontaneous speech/repetition/naming/comprehension. Fund of Knowledge is full  CN: Optic discs are flat with normal vasculature, PERRL, Extraoccular movements and visual fields are full. Normal facial sensation and strength, Hearing symmetric, Tongue and Palate are midline and strong. Shoulder Shrug " "symmetric and strong.  Motor: Normal bulk, tone, no abnormal movements. 5/5 strength bilateral upper/lower extremities with 2+ reflexes and no clonus  Sensory: symmetric to light touch, pain, temp, and vibration,  Romberg negative  Cerebellar: Finger-nose,Heal-shin, Rapid alternating movements intact  Gait: Normal stance, no ataxia  Scar on left scalp from prior stitches    Imagin2022 CT head: No acute intracranial abnormality.  Labs:2022 CMP, CBC, and TSH normal  B12 218      Assessment/Plan: Luis Daniel Wynne Jr. is a 46 y.o. male who started having episodes lasting several days in 2022 of memory loss (short and long term) along with challenges with vision, taste and smell.   I recommend:     1. 2022 CT head unremarkable    2. He continues with residual vague taste/smell/visual challenges after having 4-5 of these prolonged episodes.     3. MRI brain    4. EEG  -Consider longer term EEG if spells persist (currently this seems improved)  -He does have a history THC use. Denies changes in use/ frequency. If spell recur, I would suggest he stop use.   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 Can't r/o Covid related changes otherwise  -If MRI is normal, I suggest he see an eye doctor    5. Note his B12 level is 218. Start 1000mcg B12 OTC daily    6. Low T followed by urology. Sees psychiatry for ADHD and anxiety    RTC 6 weeks  CC: Dr. Dunbar      "

## 2022-08-25 ENCOUNTER — NURSE TRIAGE (OUTPATIENT)
Dept: ADMINISTRATIVE | Facility: CLINIC | Age: 47
End: 2022-08-25
Payer: COMMERCIAL

## 2022-08-26 ENCOUNTER — PATIENT MESSAGE (OUTPATIENT)
Dept: NEUROLOGY | Facility: CLINIC | Age: 47
End: 2022-08-26
Payer: COMMERCIAL
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
## 2022-08-29 NOTE — TELEPHONE ENCOUNTER
Unable to reach patient by phone. Left message on voicemail for callback and portal message sent.

## 2022-09-07 ENCOUNTER — PATIENT MESSAGE (OUTPATIENT)
Dept: INTERNAL MEDICINE | Facility: CLINIC | Age: 47
End: 2022-09-07
Payer: COMMERCIAL

## 2022-09-07 ENCOUNTER — TELEPHONE (OUTPATIENT)
Dept: UROLOGY | Facility: CLINIC | Age: 47
End: 2022-09-07
Payer: COMMERCIAL

## 2022-09-07 DIAGNOSIS — E29.1 MALE HYPOGONADISM: Primary | ICD-10-CM

## 2022-09-07 NOTE — TELEPHONE ENCOUNTER
Please notify his T is still low.  Verify he used androgel correctly and on the day of his draw.  If so, increase to 3 pumps/day.  Recheck T in 1 week.

## 2022-09-15 ENCOUNTER — PATIENT MESSAGE (OUTPATIENT)
Dept: INTERNAL MEDICINE | Facility: CLINIC | Age: 47
End: 2022-09-15
Payer: COMMERCIAL

## 2022-09-15 DIAGNOSIS — R41.3 MEMORY LOSS: Primary | ICD-10-CM

## 2022-09-19 ENCOUNTER — TELEPHONE (OUTPATIENT)
Dept: UROLOGY | Facility: CLINIC | Age: 47
End: 2022-09-19
Payer: COMMERCIAL

## 2022-09-19 DIAGNOSIS — E29.1 MALE HYPOGONADISM: Primary | ICD-10-CM

## 2022-09-19 NOTE — TELEPHONE ENCOUNTER
Please notify his T is off the charts high.  Verify he only used 3 pumps of androgel.  If so, decrease to 2 pumps.  Recheck T in 1 week.

## 2022-09-20 NOTE — TELEPHONE ENCOUNTER
Call placed to patient. Name and date of birth verified. Patient informed of the following:    Please notify his T is off the charts high.  Verify he only used 3 pumps of androgel.  If so, decrease to 2 pumps.  Recheck T in 1 week.  -Dr. Abdi    Patient stated he applies 3 pumps to shoulder daily and prior to lab draw. Patient instructed to decrease application to 2 pumps daily and reminded to apply prior to lab. Lab appointment scheduled and noted in epic. Appointment informed appointment details are noted in portal. Patient verbalized understanding.

## 2022-09-21 ENCOUNTER — HOSPITAL ENCOUNTER (OUTPATIENT)
Facility: HOSPITAL | Age: 47
Discharge: HOME | End: 2022-09-22
Attending: HOSPITALIST | Admitting: HOSPITALIST
Payer: COMMERCIAL

## 2022-09-21 DIAGNOSIS — R56.9 SEIZURE: ICD-10-CM

## 2022-09-21 DIAGNOSIS — R07.9 CHEST PAIN: ICD-10-CM

## 2022-09-21 PROCEDURE — G0378 HOSPITAL OBSERVATION PER HR: HCPCS

## 2022-09-21 PROCEDURE — G0379 DIRECT REFER HOSPITAL OBSERV: HCPCS

## 2022-09-21 RX ORDER — POLYETHYLENE GLYCOL 3350 17 G/17G
17 POWDER, FOR SOLUTION ORAL 2 TIMES DAILY PRN
Status: DISCONTINUED | OUTPATIENT
Start: 2022-09-22 | End: 2022-09-22 | Stop reason: HOSPADM

## 2022-09-21 RX ORDER — TALC
6 POWDER (GRAM) TOPICAL NIGHTLY PRN
Status: DISCONTINUED | OUTPATIENT
Start: 2022-09-22 | End: 2022-09-22 | Stop reason: HOSPADM

## 2022-09-21 RX ORDER — LANOLIN ALCOHOL/MO/W.PET/CERES
800 CREAM (GRAM) TOPICAL
Status: DISCONTINUED | OUTPATIENT
Start: 2022-09-22 | End: 2022-09-22 | Stop reason: HOSPADM

## 2022-09-21 RX ORDER — AMOXICILLIN 250 MG
1 CAPSULE ORAL 2 TIMES DAILY
Status: DISCONTINUED | OUTPATIENT
Start: 2022-09-22 | End: 2022-09-22

## 2022-09-21 RX ORDER — NALOXONE HCL 0.4 MG/ML
0.02 VIAL (ML) INJECTION
Status: DISCONTINUED | OUTPATIENT
Start: 2022-09-22 | End: 2022-09-22 | Stop reason: HOSPADM

## 2022-09-21 RX ORDER — SODIUM CHLORIDE 0.9 % (FLUSH) 0.9 %
10 SYRINGE (ML) INJECTION EVERY 12 HOURS PRN
Status: DISCONTINUED | OUTPATIENT
Start: 2022-09-22 | End: 2022-09-22 | Stop reason: HOSPADM

## 2022-09-21 RX ORDER — ACETAMINOPHEN 325 MG/1
650 TABLET ORAL EVERY 8 HOURS PRN
Status: DISCONTINUED | OUTPATIENT
Start: 2022-09-22 | End: 2022-09-22 | Stop reason: HOSPADM

## 2022-09-21 RX ORDER — GLUCAGON 1 MG
1 KIT INJECTION
Status: DISCONTINUED | OUTPATIENT
Start: 2022-09-22 | End: 2022-09-22 | Stop reason: HOSPADM

## 2022-09-21 RX ORDER — NITROGLYCERIN 0.4 MG/1
0.4 TABLET SUBLINGUAL EVERY 5 MIN PRN
Status: DISCONTINUED | OUTPATIENT
Start: 2022-09-22 | End: 2022-09-22 | Stop reason: HOSPADM

## 2022-09-21 RX ORDER — MAG HYDROX/ALUMINUM HYD/SIMETH 200-200-20
30 SUSPENSION, ORAL (FINAL DOSE FORM) ORAL 4 TIMES DAILY PRN
Status: DISCONTINUED | OUTPATIENT
Start: 2022-09-22 | End: 2022-09-22 | Stop reason: HOSPADM

## 2022-09-21 RX ORDER — ACETAMINOPHEN 325 MG/1
650 TABLET ORAL EVERY 4 HOURS PRN
Status: DISCONTINUED | OUTPATIENT
Start: 2022-09-22 | End: 2022-09-22 | Stop reason: HOSPADM

## 2022-09-21 RX ORDER — ONDANSETRON 2 MG/ML
4 INJECTION INTRAMUSCULAR; INTRAVENOUS EVERY 6 HOURS PRN
Status: DISCONTINUED | OUTPATIENT
Start: 2022-09-22 | End: 2022-09-22 | Stop reason: HOSPADM

## 2022-09-21 RX ORDER — ASPIRIN 325 MG
325 TABLET ORAL DAILY
Status: DISCONTINUED | OUTPATIENT
Start: 2022-09-22 | End: 2022-09-22 | Stop reason: HOSPADM

## 2022-09-21 RX ORDER — IBUPROFEN 200 MG
24 TABLET ORAL
Status: DISCONTINUED | OUTPATIENT
Start: 2022-09-22 | End: 2022-09-22 | Stop reason: HOSPADM

## 2022-09-21 RX ORDER — IBUPROFEN 200 MG
16 TABLET ORAL
Status: DISCONTINUED | OUTPATIENT
Start: 2022-09-22 | End: 2022-09-22 | Stop reason: HOSPADM

## 2022-09-22 ENCOUNTER — TELEPHONE (OUTPATIENT)
Dept: INTERNAL MEDICINE | Facility: CLINIC | Age: 47
End: 2022-09-22
Payer: COMMERCIAL

## 2022-09-22 VITALS
OXYGEN SATURATION: 98 % | RESPIRATION RATE: 16 BRPM | HEART RATE: 66 BPM | HEIGHT: 70 IN | TEMPERATURE: 98 F | SYSTOLIC BLOOD PRESSURE: 118 MMHG | DIASTOLIC BLOOD PRESSURE: 72 MMHG | BODY MASS INDEX: 22.85 KG/M2 | WEIGHT: 159.63 LBS

## 2022-09-22 PROBLEM — R56.9 SEIZURE: Status: ACTIVE | Noted: 2022-09-22

## 2022-09-22 PROBLEM — R40.20 LOC (LOSS OF CONSCIOUSNESS): Status: RESOLVED | Noted: 2022-09-22 | Resolved: 2022-09-22

## 2022-09-22 PROBLEM — R63.0 ANOREXIA: Status: ACTIVE | Noted: 2022-09-22

## 2022-09-22 PROBLEM — E53.8 B12 DEFICIENCY DUE TO DIET: Status: ACTIVE | Noted: 2022-09-22

## 2022-09-22 PROBLEM — R40.20 LOC (LOSS OF CONSCIOUSNESS): Status: ACTIVE | Noted: 2022-09-22

## 2022-09-22 PROBLEM — F12.90 MARIJUANA USE: Status: ACTIVE | Noted: 2022-09-22

## 2022-09-22 LAB
ANION GAP SERPL CALC-SCNC: 12 MMOL/L (ref 8–16)
BASOPHILS # BLD AUTO: 0.05 K/UL (ref 0–0.2)
BASOPHILS NFR BLD: 0.4 % (ref 0–1.9)
BUN SERPL-MCNC: 12 MG/DL (ref 6–20)
CALCIUM SERPL-MCNC: 9.6 MG/DL (ref 8.7–10.5)
CHLORIDE SERPL-SCNC: 104 MMOL/L (ref 95–110)
CK SERPL-CCNC: 59 U/L (ref 20–200)
CO2 SERPL-SCNC: 25 MMOL/L (ref 23–29)
CREAT SERPL-MCNC: 0.8 MG/DL (ref 0.5–1.4)
DIFFERENTIAL METHOD: ABNORMAL
EOSINOPHIL # BLD AUTO: 0.3 K/UL (ref 0–0.5)
EOSINOPHIL NFR BLD: 2.4 % (ref 0–8)
ERYTHROCYTE [DISTWIDTH] IN BLOOD BY AUTOMATED COUNT: 12.6 % (ref 11.5–14.5)
EST. GFR  (NO RACE VARIABLE): >60 ML/MIN/1.73 M^2
FOLATE SERPL-MCNC: 3 NG/ML (ref 4–24)
GLUCOSE SERPL-MCNC: 69 MG/DL (ref 70–110)
GLUCOSE SERPL-MCNC: 84 MG/DL (ref 70–110)
HCT VFR BLD AUTO: 42.9 % (ref 40–54)
HGB BLD-MCNC: 14.6 G/DL (ref 14–18)
IMM GRANULOCYTES # BLD AUTO: 0.05 K/UL (ref 0–0.04)
IMM GRANULOCYTES NFR BLD AUTO: 0.4 % (ref 0–0.5)
LYMPHOCYTES # BLD AUTO: 2 K/UL (ref 1–4.8)
LYMPHOCYTES NFR BLD: 15.8 % (ref 18–48)
MAGNESIUM SERPL-MCNC: 2.2 MG/DL (ref 1.6–2.6)
MCH RBC QN AUTO: 31.9 PG (ref 27–31)
MCHC RBC AUTO-ENTMCNC: 34 G/DL (ref 32–36)
MCV RBC AUTO: 94 FL (ref 82–98)
MONOCYTES # BLD AUTO: 0.9 K/UL (ref 0.3–1)
MONOCYTES NFR BLD: 6.8 % (ref 4–15)
NEUTROPHILS # BLD AUTO: 9.3 K/UL (ref 1.8–7.7)
NEUTROPHILS NFR BLD: 74.2 % (ref 38–73)
NRBC BLD-RTO: 0 /100 WBC
PLATELET # BLD AUTO: 230 K/UL (ref 150–450)
PMV BLD AUTO: 10.6 FL (ref 9.2–12.9)
POCT GLUCOSE: 84 MG/DL (ref 70–110)
POTASSIUM SERPL-SCNC: 3.8 MMOL/L (ref 3.5–5.1)
RBC # BLD AUTO: 4.58 M/UL (ref 4.6–6.2)
SODIUM SERPL-SCNC: 141 MMOL/L (ref 136–145)
TROPONIN I SERPL DL<=0.01 NG/ML-MCNC: 0.01 NG/ML (ref 0–0.03)
TROPONIN I SERPL DL<=0.01 NG/ML-MCNC: 0.01 NG/ML (ref 0–0.03)
TSH SERPL DL<=0.005 MIU/L-ACNC: 1.09 UIU/ML (ref 0.4–4)
WBC # BLD AUTO: 12.52 K/UL (ref 3.9–12.7)

## 2022-09-22 PROCEDURE — 95819 EEG AWAKE AND ASLEEP: CPT

## 2022-09-22 PROCEDURE — G0378 HOSPITAL OBSERVATION PER HR: HCPCS

## 2022-09-22 PROCEDURE — 80048 BASIC METABOLIC PNL TOTAL CA: CPT | Performed by: NURSE PRACTITIONER

## 2022-09-22 PROCEDURE — 85025 COMPLETE CBC W/AUTO DIFF WBC: CPT | Performed by: NURSE PRACTITIONER

## 2022-09-22 PROCEDURE — 84207 ASSAY OF VITAMIN B-6: CPT | Performed by: NURSE PRACTITIONER

## 2022-09-22 PROCEDURE — 36415 COLL VENOUS BLD VENIPUNCTURE: CPT | Performed by: NURSE PRACTITIONER

## 2022-09-22 PROCEDURE — 84484 ASSAY OF TROPONIN QUANT: CPT | Performed by: NURSE PRACTITIONER

## 2022-09-22 PROCEDURE — 84425 ASSAY OF VITAMIN B-1: CPT | Performed by: NURSE PRACTITIONER

## 2022-09-22 PROCEDURE — 84443 ASSAY THYROID STIM HORMONE: CPT | Performed by: NURSE PRACTITIONER

## 2022-09-22 PROCEDURE — 25000003 PHARM REV CODE 250: Performed by: NURSE PRACTITIONER

## 2022-09-22 PROCEDURE — 95816 EEG AWAKE AND DROWSY: CPT | Mod: 26,,, | Performed by: PSYCHIATRY & NEUROLOGY

## 2022-09-22 PROCEDURE — A9585 GADOBUTROL INJECTION: HCPCS | Performed by: HOSPITALIST

## 2022-09-22 PROCEDURE — 83735 ASSAY OF MAGNESIUM: CPT | Performed by: NURSE PRACTITIONER

## 2022-09-22 PROCEDURE — 25500020 PHARM REV CODE 255: Performed by: HOSPITALIST

## 2022-09-22 PROCEDURE — 95816 PR EEG,W/AWAKE & DROWSY RECORD: ICD-10-PCS | Mod: 26,,, | Performed by: PSYCHIATRY & NEUROLOGY

## 2022-09-22 PROCEDURE — 82550 ASSAY OF CK (CPK): CPT | Performed by: NURSE PRACTITIONER

## 2022-09-22 PROCEDURE — 82746 ASSAY OF FOLIC ACID SERUM: CPT | Performed by: NURSE PRACTITIONER

## 2022-09-22 RX ORDER — LORAZEPAM 2 MG/ML
2 INJECTION INTRAMUSCULAR
Status: DISCONTINUED | OUTPATIENT
Start: 2022-09-22 | End: 2022-09-22 | Stop reason: HOSPADM

## 2022-09-22 RX ORDER — LACOSAMIDE 50 MG/1
50 TABLET ORAL EVERY 12 HOURS
Status: DISCONTINUED | OUTPATIENT
Start: 2022-09-22 | End: 2022-09-22 | Stop reason: HOSPADM

## 2022-09-22 RX ORDER — LACOSAMIDE 50 MG/1
50 TABLET ORAL EVERY 12 HOURS
Qty: 60 TABLET | Refills: 11 | Status: SHIPPED | OUTPATIENT
Start: 2022-09-22 | End: 2022-09-26 | Stop reason: ALTCHOICE

## 2022-09-22 RX ORDER — GADOBUTROL 604.72 MG/ML
7 INJECTION INTRAVENOUS
Status: COMPLETED | OUTPATIENT
Start: 2022-09-22 | End: 2022-09-22

## 2022-09-22 RX ADMIN — ASPIRIN 325 MG ORAL TABLET 325 MG: 325 PILL ORAL at 12:09

## 2022-09-22 RX ADMIN — GADOBUTROL 7 ML: 604.72 INJECTION INTRAVENOUS at 02:09
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
## 2022-09-22 NOTE — TELEPHONE ENCOUNTER
's next available is not until November. I have scheduled pt with the next available provider on 10/05/2022 at 10 am in office with .

## 2022-09-22 NOTE — DISCHARGE INSTRUCTIONS
Discharge Instructions, [facility] Medicine    Thank you for choosing [facility] for your medical care. The primary doctor who is taking care of you at the time of your discharge is Lalo Costa MD.     You were admitted to the hospital with Seizure.     Please note your discharge instructions, including diet/activity restrictions, follow-up appointments, and medication changes.  If you have any questions about your medical issues, prescriptions, or any other questions, please feel free to contact the [facility] Medicine Dept at [phone] and we will help.    If you are previously with Home health, outpatient PT/OT or under a therapy program, you are cleared to return to those programs.    Please direct all long term medication refills and follow up to your primary care provider, Zenobia Dunbar MD. Thank you again for letting us take care of your health care needs.

## 2022-09-22 NOTE — PLAN OF CARE
Pt clear for DC from CM standpoint.       09/22/22 1253   Final Note   Assessment Type Final Discharge Note   Anticipated Discharge Disposition Home

## 2022-09-22 NOTE — PLAN OF CARE
BERGERON signed.       09/22/22 1253   BERGERON Message   Medicare Outpatient and Observation Notification regarding financial responsibility Signed/date by patient/caregiver;Explained to patient/caregiver   Date BERGERON was signed 09/22/22   Time BERGERON was signed 1243

## 2022-09-22 NOTE — PLAN OF CARE
POC/Meds reviewed, pt verbalized understanding. Pts family at bedside. Vitals stable.  Afebrile. Tele In place-[id number].  Up with x1 assist. Repositions self. Hourly/Q2hr rounding performed, safety maintained. Bed in lowest position, wheels locked, SR up x2, call light in easy reach. No  complaints at this time.
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
## 2022-09-22 NOTE — PLAN OF CARE
[facility]  Initial Discharge Assessment       Primary Care Provider: Zenobia Dunbar MD    Admission Diagnosis: Seizure [R56.9]    Admission Date: 9/21/2022  Expected Discharge Date: 9/22/2022    Dc assessment completed with pt. Pt confirms info on facesheet is current. PCP Dr Dunbar. Pharmacy [facility]. Pt's wife, Sandy, is emergency contact. Denies DME. Denies HD/DM. No new needs anticipated at dc. Family friend to provide ride home. CM following         Payor: HUMANA / Plan: HUMANA POS / Product Type: PPO /     Extended Emergency Contact Information  Primary Emergency Contact: Sandy Wynne  Address: [address] [place] [address] [place] [address] [place]  Mobile Phone: [phone]  Relation: Spouse  Secondary Emergency Contact: Edwin Chaudhary  Address: [address]            [place] [place] [address] [facility]  Home Phone: [phone]  Mobile Phone: [phone]  Relation: Mother    Discharge Plan A: Home  Discharge Plan B: Home with family      [id number] - General acute hospital [address] [facility] [address]   [address]  [place] [address]  Phone: [phone] Fax: [phone]      Initial Assessment (most recent)       Adult Discharge Assessment - 09/22/22 1231          Discharge Assessment    Assessment Type Discharge Planning Assessment     Confirmed/corrected address, phone number and insurance Yes     Confirmed Demographics Correct on Facesheet     Source of Information patient     Does patient/caregiver understand observation status Yes     Lives With spouse     Do you expect to return to your current living situation? Yes     Do you have help at home or someone to help you manage your care at home? Yes     Prior to hospitilization cognitive status: Alert/Oriented     Current cognitive status: Alert/Oriented     Equipment Currently Used at Home none     Readmission within 30 days? No     Patient  currently being followed by outpatient case management? No     Do you currently have service(s) that help you manage your care at home? No     Do you take prescription medications? Yes     Do you have prescription coverage? Yes     Do you have any problems affording any of your prescribed medications? No     Is the patient taking medications as prescribed? yes     How do you get to doctors appointments? family or friend will provide     Are you on dialysis? No     Do you take coumadin? No     Discharge Plan A Home     Discharge Plan B Home with family     DME Needed Upon Discharge  none
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
## 2022-09-22 NOTE — ASSESSMENT & PLAN NOTE
Admit to telemetry  Consult neurology  Seizure prxn  PRN ativan for seizures   MRI with and without contrast in AM   neurochecks q4h

## 2022-09-22 NOTE — SUBJECTIVE & OBJECTIVE
Past Medical History:   Diagnosis Date    Depression     Fibromyalgia     Peptic ulcer     Scoliosis        Past Surgical History:   Procedure Laterality Date    KNEE ARTHROSCOPY         Review of patient's allergies indicates:  No Known Allergies    Current Facility-Administered Medications on File Prior to Encounter   Medication    [COMPLETED] levETIRAcetam injection 1,000 mg    [COMPLETED] lorazepam injection 1 mg    [DISCONTINUED] lorazepam injection 2 mg     Current Outpatient Medications on File Prior to Encounter   Medication Sig    venlafaxine (EFFEXOR-XR) 150 MG Cp24 Take 300 mg by mouth once daily.    dextroamphetamine-amphetamine (ADDERALL XR) 20 MG 24 hr capsule TK ONE C PO  BID    ibuprofen (ADVIL,MOTRIN) 800 MG tablet Take 1 tablet (800 mg total) by mouth 3 (three) times daily as needed for Pain. (Patient not taking: Reported on 2022)    testosterone (ANDROGEL) 20.25 mg/1.25 gram (1.62 %) GlPm Apply 2 pumps to shoulders daily     Family History       Problem Relation (Age of Onset)    No Known Problems Mother, Father          Tobacco Use    Smoking status: Former     Packs/day: 0.50     Types: Cigarettes     Quit date: 2016     Years since quittin.0    Smokeless tobacco: Current   Substance and Sexual Activity    Alcohol use: Never    Drug use: Yes     Types: Marijuana    Sexual activity: Yes     Partners: Female     Review of Systems   Constitutional:  Positive for fatigue. Negative for chills, diaphoresis and fever.   HENT:  Negative for congestion, ear pain, sore throat and trouble swallowing.    Eyes:  Negative for pain, discharge and visual disturbance.   Respiratory:  Negative for cough, chest tightness, shortness of breath and wheezing.    Cardiovascular:  Negative for chest pain, palpitations and leg swelling.   Gastrointestinal:  Positive for diarrhea. Negative for abdominal distention, abdominal pain, blood in stool, constipation, nausea and vomiting.   Endocrine: Negative for  polydipsia, polyphagia and polyuria.   Genitourinary:  Negative for dysuria, flank pain, frequency and urgency.   Musculoskeletal:  Negative for back pain, joint swelling, neck pain and neck stiffness.   Skin:  Negative for rash and wound.   Allergic/Immunologic: Negative for immunocompromised state.   Neurological:  Positive for seizures, syncope and weakness. Negative for dizziness, speech difficulty, light-headedness, numbness and headaches.   Hematological:  Negative for adenopathy.   Psychiatric/Behavioral:  Positive for confusion. Negative for suicidal ideas. The patient is not nervous/anxious.    All other systems reviewed and are negative.  Objective:     Vital Signs (Most Recent):  Temp: 99.1 °F (37.3 °C) (09/21/22 2336)  Pulse: 72 (09/21/22 2336)  Resp: 17 (09/21/22 2336)  BP: 134/75 (09/21/22 2336)  SpO2: 99 % (09/21/22 2336) Vital Signs (24h Range):  Temp:  [98.3 °F (36.8 °C)-99.1 °F (37.3 °C)] 99.1 °F (37.3 °C)  Pulse:  [60-80] 72  Resp:  [16-24] 17  SpO2:  [94 %-100 %] 99 %  BP: (118-144)/(59-92) 134/75     Weight: 72.4 kg (159 lb 9.8 oz)  Body mass index is 22.9 kg/m².    Physical Exam  Vitals and nursing note reviewed.   Constitutional:       Appearance: He is well-developed.   HENT:      Head: Normocephalic and atraumatic.   Eyes:      Conjunctiva/sclera: Conjunctivae normal.      Pupils: Pupils are equal, round, and reactive to light.   Cardiovascular:      Rate and Rhythm: Normal rate and regular rhythm.      Heart sounds: Normal heart sounds.   Pulmonary:      Effort: Pulmonary effort is normal.      Breath sounds: Normal breath sounds.   Abdominal:      General: Bowel sounds are normal.      Palpations: Abdomen is soft.   Musculoskeletal:         General: Normal range of motion.      Cervical back: Normal range of motion and neck supple.   Skin:     General: Skin is warm and dry.      Capillary Refill: Capillary refill takes less than 2 seconds.   Neurological:      Mental Status: He is alert  and oriented to person, place, and time.   Psychiatric:         Behavior: Behavior normal.         Thought Content: Thought content normal.         Judgment: Judgment normal.         CRANIAL NERVES     CN III, IV, VI   Pupils are equal, round, and reactive to light.     Significant Labs: All pertinent labs within the past 24 hours have been reviewed.  CBC:   Recent Labs   Lab 09/21/22  1331   WBC 10.26   HGB 16.1   HCT 46.9        CMP:   Recent Labs   Lab 09/21/22  1331      K 4.3      CO2 30*   *   BUN 12   CREATININE 1.00   CALCIUM 9.3   PROT 8.7*   ALBUMIN 5.0   BILITOT 1.1*   ALKPHOS 83   AST 30   ALT 31   ANIONGAP 11     Troponin:   Recent Labs   Lab 09/21/22  1331   TROPONINI <0.012       Significant Imaging: I have reviewed all pertinent imaging results/findings within the past 24 hours.  EKG: I have reviewed all pertinent results/findings within the past 24 hours and my personal findings are: NSR no acute ischemic changes  CT Head Without Contrast    Result Date: 9/21/2022  EXAMINATION: CT HEAD WITHOUT CONTRAST CLINICAL HISTORY: Syncope, recurrent;Memory loss; TECHNIQUE: Low dose axial images were obtained through the head.  Coronal and sagittal reformations were also performed. Contrast was not administered. COMPARISON: 08/25/2022. FINDINGS: The ventricles are normal in size and configuration.  There is normal gray-white matter differentiation maintained.  There is no evidence for abnormal masses, mass effect, or midline shift.  No abnormal intra or extra-axial fluid collections are identified, specifically there is no evidence for intracranial hemorrhage.  Mastoid air cells and visualized paranasal sinuses are clear.  Bony calvarium is intact.     No acute intracranial abnormalities identified.  No evidence for intracranial hemorrhage.  No detrimental change noted when compared to 08/25/2022. Electronically signed by: Gonzalez Tolliver MD Date:    09/21/2022  Time:    13:52    C      X-Ray Chest AP Portable    Result Date: 9/21/2022  EXAMINATION: XR CHEST AP PORTABLE CLINICAL HISTORY: syncope vs seizure, altered mental status; TECHNIQUE: Single frontal view of the chest was performed. COMPARISON: 04/15/2009. FINDINGS: The heart is not enlarged.  Superior mediastinal structures are unremarkable.  Pulmonary vasculature is within normal limits.  The lungs are free of focal consolidations.  There is no evidence for pneumothorax or large pleural effusions.  Bony structures are grossly intact.     No acute chest disease identified. Electronically signed by: Gonzalez Tolliver MD Date:    09/21/2022 Time:    13:53

## 2022-09-22 NOTE — PLAN OF CARE
Neurology follow up scheduled and added to AVS       09/22/22 4743   Post-Acute Status   Hospital Resources/Appts/Education Provided Appointments scheduled by Navigator/Coordinator

## 2022-09-22 NOTE — TELEPHONE ENCOUNTER
----- Message from Kenia Eaton sent at 9/22/2022 12:30 PM CDT -----  Contact: pt [phone]  Robert from [facility] requesting hospital f/u for pt discharge 9/22/22 need appt in a week.    Please call and advise

## 2022-09-23 ENCOUNTER — TELEPHONE (OUTPATIENT)
Dept: NEUROLOGY | Facility: CLINIC | Age: 47
End: 2022-09-23
Payer: COMMERCIAL

## 2022-09-23 ENCOUNTER — TELEPHONE (OUTPATIENT)
Dept: MEDSURG UNIT | Facility: HOSPITAL | Age: 47
End: 2022-09-23
Payer: COMMERCIAL

## 2022-09-23 NOTE — TELEPHONE ENCOUNTER
----- Message from Yris Becerril sent at 2022  3:38 PM CDT -----  Contact: consuelo/spouse  Luis Daniel Wynne Jr.  MRN: [id number]  : 1975  PCP: Zenobia Dunbar  Home Phone      [phone]  Work Phone      Not on file.  Mobile          [phone]      MESSAGE: Have question and concern patient was seen in [facility] Ed have an appt on Oct. 4th still having seizures         Phone [phone]

## 2022-09-23 NOTE — TELEPHONE ENCOUNTER
Patients wife contacted the office to discuss the patients recent seizures and hospital and ED visits and requesting clinic follow up. Unable to find close availability. Will contact the patient on Monday to arrange.

## 2022-09-24 ENCOUNTER — HOSPITAL ENCOUNTER (INPATIENT)
Facility: HOSPITAL | Age: 47
LOS: 1 days | Discharge: LEFT BEFORE DISCHARGE | DRG: 101 | End: 2022-09-24
Attending: EMERGENCY MEDICINE | Admitting: STUDENT IN AN ORGANIZED HEALTH CARE EDUCATION/TRAINING PROGRAM
Payer: COMMERCIAL

## 2022-09-24 VITALS
DIASTOLIC BLOOD PRESSURE: 74 MMHG | RESPIRATION RATE: 16 BRPM | SYSTOLIC BLOOD PRESSURE: 118 MMHG | WEIGHT: 160 LBS | HEIGHT: 70 IN | TEMPERATURE: 99 F | OXYGEN SATURATION: 99 % | HEART RATE: 88 BPM | BODY MASS INDEX: 22.9 KG/M2

## 2022-09-24 DIAGNOSIS — G40.909 RECURRENT SEIZURES: ICD-10-CM

## 2022-09-24 LAB
ALBUMIN SERPL BCP-MCNC: 4.3 G/DL (ref 3.5–5.2)
ALP SERPL-CCNC: 78 U/L (ref 55–135)
ALT SERPL W/O P-5'-P-CCNC: 17 U/L (ref 10–44)
ANION GAP SERPL CALC-SCNC: 10 MMOL/L (ref 8–16)
AST SERPL-CCNC: 15 U/L (ref 10–40)
BASOPHILS # BLD AUTO: 0.04 K/UL (ref 0–0.2)
BASOPHILS NFR BLD: 0.4 % (ref 0–1.9)
BILIRUB SERPL-MCNC: 1.1 MG/DL (ref 0.1–1)
BUN SERPL-MCNC: 13 MG/DL (ref 6–20)
CALCIUM SERPL-MCNC: 9.6 MG/DL (ref 8.7–10.5)
CHLORIDE SERPL-SCNC: 101 MMOL/L (ref 95–110)
CK SERPL-CCNC: 52 U/L (ref 20–200)
CO2 SERPL-SCNC: 24 MMOL/L (ref 23–29)
CREAT SERPL-MCNC: 0.9 MG/DL (ref 0.5–1.4)
DIFFERENTIAL METHOD: ABNORMAL
EOSINOPHIL # BLD AUTO: 0.1 K/UL (ref 0–0.5)
EOSINOPHIL NFR BLD: 1.1 % (ref 0–8)
ERYTHROCYTE [DISTWIDTH] IN BLOOD BY AUTOMATED COUNT: 12.3 % (ref 11.5–14.5)
EST. GFR  (NO RACE VARIABLE): >60 ML/MIN/1.73 M^2
GLUCOSE SERPL-MCNC: 90 MG/DL (ref 70–110)
HCT VFR BLD AUTO: 44 % (ref 40–54)
HGB BLD-MCNC: 15.2 G/DL (ref 14–18)
IMM GRANULOCYTES # BLD AUTO: 0.03 K/UL (ref 0–0.04)
IMM GRANULOCYTES NFR BLD AUTO: 0.3 % (ref 0–0.5)
LACTATE SERPL-SCNC: 1 MMOL/L (ref 0.5–2.2)
LYMPHOCYTES # BLD AUTO: 2 K/UL (ref 1–4.8)
LYMPHOCYTES NFR BLD: 18 % (ref 18–48)
MCH RBC QN AUTO: 32 PG (ref 27–31)
MCHC RBC AUTO-ENTMCNC: 34.5 G/DL (ref 32–36)
MCV RBC AUTO: 93 FL (ref 82–98)
MONOCYTES # BLD AUTO: 1 K/UL (ref 0.3–1)
MONOCYTES NFR BLD: 8.5 % (ref 4–15)
NEUTROPHILS # BLD AUTO: 8 K/UL (ref 1.8–7.7)
NEUTROPHILS NFR BLD: 71.7 % (ref 38–73)
NRBC BLD-RTO: 0 /100 WBC
PLATELET # BLD AUTO: 221 K/UL (ref 150–450)
PMV BLD AUTO: 10.7 FL (ref 9.2–12.9)
POTASSIUM SERPL-SCNC: 3.6 MMOL/L (ref 3.5–5.1)
PROLACTIN SERPL IA-MCNC: 7.7 NG/ML (ref 3.5–19.4)
PROT SERPL-MCNC: 7.5 G/DL (ref 6–8.4)
RBC # BLD AUTO: 4.75 M/UL (ref 4.6–6.2)
SARS-COV-2 RDRP RESP QL NAA+PROBE: NEGATIVE
SODIUM SERPL-SCNC: 135 MMOL/L (ref 136–145)
WBC # BLD AUTO: 11.14 K/UL (ref 3.9–12.7)

## 2022-09-24 PROCEDURE — U0002 COVID-19 LAB TEST NON-CDC: HCPCS | Performed by: PHYSICIAN ASSISTANT

## 2022-09-24 PROCEDURE — 93010 ELECTROCARDIOGRAM REPORT: CPT | Mod: ,,, | Performed by: INTERNAL MEDICINE

## 2022-09-24 PROCEDURE — 11000001 HC ACUTE MED/SURG PRIVATE ROOM

## 2022-09-24 PROCEDURE — 82550 ASSAY OF CK (CPK): CPT | Performed by: PHYSICIAN ASSISTANT

## 2022-09-24 PROCEDURE — 84146 ASSAY OF PROLACTIN: CPT | Performed by: PHYSICIAN ASSISTANT

## 2022-09-24 PROCEDURE — 83605 ASSAY OF LACTIC ACID: CPT | Performed by: PHYSICIAN ASSISTANT

## 2022-09-24 PROCEDURE — 99284 PR EMERGENCY DEPT VISIT,LEVEL IV: ICD-10-PCS | Mod: CS,,, | Performed by: PHYSICIAN ASSISTANT

## 2022-09-24 PROCEDURE — 94761 N-INVAS EAR/PLS OXIMETRY MLT: CPT

## 2022-09-24 PROCEDURE — 99223 PR INITIAL HOSPITAL CARE,LEVL III: ICD-10-PCS | Mod: ,,, | Performed by: STUDENT IN AN ORGANIZED HEALTH CARE EDUCATION/TRAINING PROGRAM

## 2022-09-24 PROCEDURE — 93005 ELECTROCARDIOGRAM TRACING: CPT

## 2022-09-24 PROCEDURE — 99284 EMERGENCY DEPT VISIT MOD MDM: CPT | Mod: CS,,, | Performed by: PHYSICIAN ASSISTANT

## 2022-09-24 PROCEDURE — 85025 COMPLETE CBC W/AUTO DIFF WBC: CPT | Performed by: PHYSICIAN ASSISTANT

## 2022-09-24 PROCEDURE — 93010 EKG 12-LEAD: ICD-10-PCS | Mod: ,,, | Performed by: INTERNAL MEDICINE

## 2022-09-24 PROCEDURE — 80053 COMPREHEN METABOLIC PANEL: CPT | Performed by: PHYSICIAN ASSISTANT

## 2022-09-24 PROCEDURE — 99285 EMERGENCY DEPT VISIT HI MDM: CPT

## 2022-09-24 PROCEDURE — 63600175 PHARM REV CODE 636 W HCPCS: Performed by: STUDENT IN AN ORGANIZED HEALTH CARE EDUCATION/TRAINING PROGRAM

## 2022-09-24 PROCEDURE — 99223 1ST HOSP IP/OBS HIGH 75: CPT | Mod: ,,, | Performed by: STUDENT IN AN ORGANIZED HEALTH CARE EDUCATION/TRAINING PROGRAM

## 2022-09-24 RX ORDER — IBUPROFEN 200 MG
24 TABLET ORAL
Status: DISCONTINUED | OUTPATIENT
Start: 2022-09-24 | End: 2022-09-25 | Stop reason: HOSPADM

## 2022-09-24 RX ORDER — SODIUM CHLORIDE 0.9 % (FLUSH) 0.9 %
10 SYRINGE (ML) INJECTION EVERY 12 HOURS PRN
Status: DISCONTINUED | OUTPATIENT
Start: 2022-09-24 | End: 2022-09-25 | Stop reason: HOSPADM

## 2022-09-24 RX ORDER — CYANOCOBALAMIN (VITAMIN B-12) 250 MCG
250 TABLET ORAL DAILY
Status: DISCONTINUED | OUTPATIENT
Start: 2022-09-25 | End: 2022-09-25 | Stop reason: HOSPADM

## 2022-09-24 RX ORDER — VENLAFAXINE HYDROCHLORIDE 75 MG/1
300 CAPSULE, EXTENDED RELEASE ORAL DAILY
Status: DISCONTINUED | OUTPATIENT
Start: 2022-09-25 | End: 2022-09-25 | Stop reason: HOSPADM

## 2022-09-24 RX ORDER — GLUCAGON 1 MG
1 KIT INJECTION
Status: DISCONTINUED | OUTPATIENT
Start: 2022-09-24 | End: 2022-09-25 | Stop reason: HOSPADM

## 2022-09-24 RX ORDER — IBUPROFEN 200 MG
16 TABLET ORAL
Status: DISCONTINUED | OUTPATIENT
Start: 2022-09-24 | End: 2022-09-25 | Stop reason: HOSPADM

## 2022-09-24 RX ORDER — LEVETIRACETAM 500 MG/5ML
1000 INJECTION, SOLUTION, CONCENTRATE INTRAVENOUS ONCE
Status: COMPLETED | OUTPATIENT
Start: 2022-09-24 | End: 2022-09-24

## 2022-09-24 RX ORDER — SODIUM CHLORIDE 0.9 % (FLUSH) 0.9 %
10 SYRINGE (ML) INJECTION
Status: DISCONTINUED | OUTPATIENT
Start: 2022-09-24 | End: 2022-09-25 | Stop reason: HOSPADM

## 2022-09-24 RX ADMIN — LEVETIRACETAM 1000 MG: 500 INJECTION, SOLUTION INTRAVENOUS at 09:09

## 2022-09-24 NOTE — Clinical Note
Diagnosis: Recurrent seizures [090801]   Admitting Provider:: AMAN PAUL [[id number]]   Future Attending Provider: AMAN PAUL [[id number]]   Reason for IP Medical Treatment  (Clinical interventions that can only be accomplished in the IP setting? ) :: IV   Estimated Length of Stay:: 3-4 midnights   I certify that Inpatient services for greater than or equal to 2 midnights are medically necessary:: Yes   Plans for Post-Acute care--if anticipated (pick the single best option):: A. No post acute care anticipated at this time   Special Needs:: No Special Needs [1]

## 2022-09-25 NOTE — SUBJECTIVE & OBJECTIVE
Past Medical History:   Diagnosis Date    Depression     Fibromyalgia     Marijuana use     Peptic ulcer     Scoliosis        Past Surgical History:   Procedure Laterality Date    KNEE ARTHROSCOPY         Review of patient's allergies indicates:  No Known Allergies    No current facility-administered medications on file prior to encounter.     Current Outpatient Medications on File Prior to Encounter   Medication Sig    dextroamphetamine-amphetamine (ADDERALL XR) 20 MG 24 hr capsule TK ONE C PO  BID    ibuprofen (ADVIL,MOTRIN) 800 MG tablet Take 1 tablet (800 mg total) by mouth 3 (three) times daily as needed for Pain.    lacosamide (VIMPAT) 50 mg Tab Take 1 tablet (50 mg total) by mouth every 12 (twelve) hours.    lacosamide (VIMPAT) 50 mg Tab Take 1 tablet (50 mg total) by mouth every 12 (twelve) hours.    testosterone (ANDROGEL) 20.25 mg/1.25 gram (1.62 %) GlPm Apply 2 pumps to shoulders daily    venlafaxine (EFFEXOR-XR) 150 MG Cp24 Take 300 mg by mouth once daily.     Family History       Problem Relation (Age of Onset)    No Known Problems Mother, Father          Tobacco Use    Smoking status: Former     Packs/day: 0.50     Types: Cigarettes     Quit date: 2016     Years since quittin.0    Smokeless tobacco: Current   Substance and Sexual Activity    Alcohol use: Never    Drug use: Yes     Types: Marijuana    Sexual activity: Yes     Partners: Female     Review of Systems   Constitutional:  Positive for appetite change, diaphoresis and unexpected weight change. Negative for chills, fatigue and fever.   HENT:  Negative for ear pain, facial swelling, rhinorrhea, sneezing, sore throat and trouble swallowing.    Eyes:  Negative for photophobia and visual disturbance.   Respiratory:  Negative for chest tightness and shortness of breath.    Cardiovascular:  Negative for chest pain, palpitations and leg swelling.   Gastrointestinal:  Negative for abdominal distention, anal bleeding, constipation, diarrhea and  nausea.   Genitourinary:  Negative for difficulty urinating, dysuria, hematuria and urgency.   Musculoskeletal:  Negative for arthralgias, gait problem and myalgias.   Skin:  Negative for rash and wound.   Neurological:  Positive for seizures. Negative for dizziness, tremors, syncope, speech difficulty, weakness, numbness and headaches.   Psychiatric/Behavioral:  Negative for agitation, behavioral problems, confusion and hallucinations. The patient is nervous/anxious.    Objective:     Vital Signs (Most Recent):  Temp: 98.7 °F (37.1 °C) (09/24/22 1601)  Pulse: 87 (09/24/22 1601)  Resp: 18 (09/24/22 1601)  BP: 114/80 (09/24/22 1601)  SpO2: 98 % (09/24/22 1601) Vital Signs (24h Range):  Temp:  [98.7 °F (37.1 °C)] 98.7 °F (37.1 °C)  Pulse:  [87] 87  Resp:  [18] 18  SpO2:  [98 %] 98 %  BP: (114)/(80) 114/80     Weight: 72.6 kg (160 lb)  Body mass index is 22.96 kg/m².    Physical Exam  Constitutional:       Appearance: He is not ill-appearing, toxic-appearing or diaphoretic.      Comments: Pleasant man appearing anxious but in no acute distress. Cooperative with examination and conversant with interview.   HENT:      Head: Normocephalic.      Comments: Scar noted from prior traumatic head injury. Clean, dry and intact.     Nose: Nose normal. No congestion or rhinorrhea.      Mouth/Throat:      Mouth: Mucous membranes are moist.      Pharynx: Oropharynx is clear. No oropharyngeal exudate or posterior oropharyngeal erythema.   Eyes:      General: No scleral icterus.     Extraocular Movements: Extraocular movements intact.   Cardiovascular:      Rate and Rhythm: Normal rate.      Pulses: Normal pulses.      Heart sounds: No murmur heard.    No friction rub. No gallop.      Comments: Dorsalis pedis pulses and radial pulses +2  Pulmonary:      Effort: Pulmonary effort is normal. No respiratory distress.      Breath sounds: Normal breath sounds. No wheezing, rhonchi or rales.   Chest:      Chest wall: No tenderness.    Abdominal:      General: Abdomen is flat. Bowel sounds are normal. There is no distension.      Palpations: Abdomen is soft. There is no mass.      Tenderness: There is no abdominal tenderness. There is no guarding or rebound.      Hernia: No hernia is present.   Musculoskeletal:         General: Normal range of motion.      Cervical back: Normal range of motion and neck supple. No rigidity.      Right lower leg: No edema.      Left lower leg: No edema.   Skin:     General: Skin is warm and dry.      Findings: No lesion or rash.   Neurological:      General: No focal deficit present.      Mental Status: He is oriented to person, place, and time. Mental status is at baseline.      Sensory: No sensory deficit.      Motor: No weakness.      Gait: Gait normal.   Psychiatric:         Mood and Affect: Mood normal.         Behavior: Behavior normal.         Thought Content: Thought content normal.         Judgment: Judgment normal.           Significant Labs: All pertinent labs within the past 24 hours have been reviewed.  CBC:   Recent Labs   Lab 09/24/22  1812   WBC 11.14   HGB 15.2   HCT 44.0        CMP:   Recent Labs   Lab 09/24/22  1812   *   K 3.6      CO2 24   GLU 90   BUN 13   CREATININE 0.9   CALCIUM 9.6   PROT 7.5   ALBUMIN 4.3   BILITOT 1.1*   ALKPHOS 78   AST 15   ALT 17   ANIONGAP 10       Significant Imaging: I have reviewed all pertinent imaging results/findings within the past 24 hours.
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
## 2022-09-25 NOTE — ED PROVIDER NOTES
Encounter Date: 2022       History     Chief Complaint   Patient presents with    Seizures     Started on   seizure meds few days ago     The patient is a 46 year old male who reports having a past medical history of ADD and disability due to fibromyalgia. He has recent new diagnosis of partial or absence seizures. He returns to the ER today c/o having additional seizure episodes today. He was admitted at [facility] for seizures on . He was given Keppra in the hospital. He had a normal MRI and EEG study, then was discharged home on Vimpat and referred to outpatient neurology. Today is his 3rd ER visit this week due to persistent recurrent seizures. He was seen on  and again on . Pt and family reports episodes are uncontrolled and occurring more frequently. Reports total of 5 episodes/seizures today. He denies LOC or fall/collapse/injury. He denies palpitations or drug use. He denies bladder incontinence or tongue biting. He denies any physical complaints at this time.     Review of patient's allergies indicates:  No Known Allergies  Past Medical History:   Diagnosis Date    Depression     Fibromyalgia     Marijuana use     Peptic ulcer     Scoliosis      Past Surgical History:   Procedure Laterality Date    KNEE ARTHROSCOPY       Family History   Problem Relation Age of Onset    No Known Problems Mother     No Known Problems Father     Heart disease Neg Hx     Hypertension Neg Hx      Social History     Tobacco Use    Smoking status: Former     Packs/day: 0.50     Types: Cigarettes     Quit date: 2016     Years since quittin.0    Smokeless tobacco: Current   Substance Use Topics    Alcohol use: Never    Drug use: Yes     Types: Marijuana     Review of Systems   Constitutional:  Positive for diaphoresis. Negative for activity change, appetite change and fever.   HENT:  Negative for congestion, rhinorrhea and sore throat.    Eyes:  Negative for pain and visual disturbance.    Respiratory:  Negative for cough and shortness of breath.    Cardiovascular:  Negative for chest pain, palpitations and leg swelling.   Gastrointestinal:  Negative for abdominal pain, blood in stool, diarrhea, nausea and vomiting.   Genitourinary:  Negative for decreased urine volume and difficulty urinating.   Musculoskeletal:  Negative for back pain, gait problem, myalgias, neck pain and neck stiffness.   Skin:  Negative for color change, rash and wound.   Allergic/Immunologic: Negative for immunocompromised state.   Neurological:  Positive for seizures. Negative for dizziness, tremors, syncope, facial asymmetry, speech difficulty, weakness, light-headedness, numbness and headaches.   Psychiatric/Behavioral:  Positive for confusion. Negative for agitation and behavioral problems.      Physical Exam     Initial Vitals [09/24/22 1601]   BP Pulse Resp Temp SpO2   114/80 87 18 98.7 °F (37.1 °C) 98 %      MAP       --         Physical Exam    Nursing note and vitals reviewed.  Constitutional: He appears well-developed and well-nourished. He is not diaphoretic. No distress.   HENT:   Head: Normocephalic and atraumatic.   Mouth/Throat: Oropharynx is clear and moist.   Eyes: Conjunctivae and EOM are normal. Pupils are equal, round, and reactive to light.   Neck: Neck supple.   Cardiovascular:  Normal rate.           Pulmonary/Chest: No respiratory distress.   Abdominal: He exhibits no distension.   Musculoskeletal:         General: No tenderness or edema. Normal range of motion.      Cervical back: Neck supple.     Neurological: He is alert and oriented to person, place, and time. He has normal strength. No sensory deficit. GCS score is 15. GCS eye subscore is 4. GCS verbal subscore is 5. GCS motor subscore is 6.   Skin: Skin is warm and dry.   Psychiatric: He has a normal mood and affect.     ED Course   Procedures  Labs Reviewed   CBC W/ AUTO DIFFERENTIAL - Abnormal; Notable for the following components:       Result  Value    MCH 32.0 (*)     Gran # (ANC) 8.0 (*)     All other components within normal limits   COMPREHENSIVE METABOLIC PANEL - Abnormal; Notable for the following components:    Sodium 135 (*)     Total Bilirubin 1.1 (*)     All other components within normal limits   CK   LACTIC ACID, PLASMA   PROLACTIN   SARS-COV-2 RNA AMPLIFICATION, QUAL   LACOSAMIDE (VIMPAT)   TOXICOLOGY SCREEN, URINE, RANDOM (COMPLIANCE)     Results for orders placed or performed during the hospital encounter of 09/24/22   CBC auto differential   Result Value Ref Range    WBC 11.14 3.90 - 12.70 K/uL    RBC 4.75 4.60 - 6.20 M/uL    Hemoglobin 15.2 14.0 - 18.0 g/dL    Hematocrit 44.0 40.0 - 54.0 %    MCV 93 82 - 98 fL    MCH 32.0 (H) 27.0 - 31.0 pg    MCHC 34.5 32.0 - 36.0 g/dL    RDW 12.3 11.5 - 14.5 %    Platelets 221 150 - 450 K/uL    MPV 10.7 9.2 - 12.9 fL    Immature Granulocytes 0.3 0.0 - 0.5 %    Gran # (ANC) 8.0 (H) 1.8 - 7.7 K/uL    Immature Grans (Abs) 0.03 0.00 - 0.04 K/uL    Lymph # 2.0 1.0 - 4.8 K/uL    Mono # 1.0 0.3 - 1.0 K/uL    Eos # 0.1 0.0 - 0.5 K/uL    Baso # 0.04 0.00 - 0.20 K/uL    nRBC 0 0 /100 WBC    Gran % 71.7 38.0 - 73.0 %    Lymph % 18.0 18.0 - 48.0 %    Mono % 8.5 4.0 - 15.0 %    Eosinophil % 1.1 0.0 - 8.0 %    Basophil % 0.4 0.0 - 1.9 %    Differential Method Automated    Comprehensive metabolic panel   Result Value Ref Range    Sodium 135 (L) 136 - 145 mmol/L    Potassium 3.6 3.5 - 5.1 mmol/L    Chloride 101 95 - 110 mmol/L    CO2 24 23 - 29 mmol/L    Glucose 90 70 - 110 mg/dL    BUN 13 6 - 20 mg/dL    Creatinine 0.9 0.5 - 1.4 mg/dL    Calcium 9.6 8.7 - 10.5 mg/dL    Total Protein 7.5 6.0 - 8.4 g/dL    Albumin 4.3 3.5 - 5.2 g/dL    Total Bilirubin 1.1 (H) 0.1 - 1.0 mg/dL    Alkaline Phosphatase 78 55 - 135 U/L    AST 15 10 - 40 U/L    ALT 17 10 - 44 U/L    Anion Gap 10 8 - 16 mmol/L    eGFR >60.0 >60 mL/min/1.73 m^2   CPK   Result Value Ref Range    CPK 52 20 - 200 U/L   Lactic acid, plasma   Result Value Ref Range     Lactate (Lactic Acid) 1.0 0.5 - 2.2 mmol/L   Prolactin   Result Value Ref Range    Prolactin 7.7 3.5 - 19.4 ng/mL   COVID-19 Rapid Screening   Result Value Ref Range    SARS-CoV-2 RNA, Amplification, Qual Negative Negative            Imaging Results    None          Medications   sodium chloride 0.9% flush 10 mL (has no administration in time range)   venlafaxine 24 hr capsule 300 mg (has no administration in time range)   cyanocobalamin tablet 250 mcg (has no administration in time range)   sodium chloride 0.9% flush 10 mL (has no administration in time range)   glucose chewable tablet 16 g (has no administration in time range)   glucose chewable tablet 24 g (has no administration in time range)   glucagon (human recombinant) injection 1 mg (has no administration in time range)   levETIRAcetam injection 1,000 mg (has no administration in time range)     Medical Decision Making:   History:   I obtained history from: someone other than patient.  Old Medical Records: I decided to obtain old medical records.  Initial Assessment:   45 yo male, hx of fibromyalgia, recent new diagnosis of partial or absence seizures, presents for 3rd ER visit this week c/o persistent seizures/episodes. Reports taking Vimpat as prescribed. Reports 5 episodes since waking up this morning.   Differential Diagnosis:   Recurrent seizure, absence seizure, petite mall seizures, pseudoseizure, depression/anxiety, etc   Clinical Tests:   Lab Tests: Ordered and Reviewed  Radiological Study: Reviewed  Medical Tests: Ordered and Reviewed  ED Management:  Vital signs reviewed   Records reviewed   I discussed the case in detail with the ER attending physician who recommends admission   I discussed the case with hospital medicine, who agrees to admit   Additional MDM:     EKG: I have independently interpreted EKG(s) - see notes.                      Clinical Impression:   Final diagnoses:  [G40.909] Recurrent seizures        ED Disposition Condition     Admit Stable                Lorenzo Kaur PA-C  09/24/22 2046

## 2022-09-25 NOTE — ED TRIAGE NOTES
46 year old male who reports having a past medical history of ADD and disability due to fibromyalgia. He has recent new diagnosis of partial or absence seizures. He returns to the ER today c/o having additional seizure episodes today. He was admitted at [facility] for seizures on 9/21. He was given Keppra in the hospital. He had a normal MRI and EEG study, then was discharged home on Vimpat and referred to outpatient neurology. Today is his 3rd ER visit this week due to persistent recurrent seizures. He was seen on 9/22 and again on 9/23. Pt and family reports episodes are uncontrolled and occurring more frequently. Reports total of 5 episodes/seizures today. He denies LOC or fall/collapse/injury. He denies palpitations or drug use. He denies bladder incontinence or tongue biting. He denies any physical complaints at this time.

## 2022-09-25 NOTE — ASSESSMENT & PLAN NOTE
Patient presenting with recurrent seizures. Given 1 g IV levetiracetam in the emergency department. Patient was encouraged to stay for observation overnight to see Neurology service in the AM. Patient was adamant and refused overnight observation after discussion of risks of leaving. Return precautions noted. Despite this, patient still opted to leave against medical advice.

## 2022-09-26 ENCOUNTER — PATIENT OUTREACH (OUTPATIENT)
Dept: ADMINISTRATIVE | Facility: CLINIC | Age: 47
End: 2022-09-26
Payer: COMMERCIAL

## 2022-09-26 ENCOUNTER — OFFICE VISIT (OUTPATIENT)
Dept: NEUROLOGY | Facility: CLINIC | Age: 47
End: 2022-09-26
Payer: COMMERCIAL

## 2022-09-26 VITALS
BODY MASS INDEX: 22.96 KG/M2 | HEART RATE: 78 BPM | DIASTOLIC BLOOD PRESSURE: 80 MMHG | HEIGHT: 70 IN | OXYGEN SATURATION: 98 % | SYSTOLIC BLOOD PRESSURE: 120 MMHG

## 2022-09-26 DIAGNOSIS — E53.8 B12 DEFICIENCY: ICD-10-CM

## 2022-09-26 DIAGNOSIS — R79.89 LOW TESTOSTERONE: ICD-10-CM

## 2022-09-26 DIAGNOSIS — R56.9 SEIZURE-LIKE ACTIVITY: Primary | ICD-10-CM

## 2022-09-26 PROCEDURE — 1111F DSCHRG MED/CURRENT MED MERGE: CPT | Mod: CPTII,S$GLB,, | Performed by: PSYCHIATRY & NEUROLOGY

## 2022-09-26 PROCEDURE — 99999 PR PBB SHADOW E&M-EST. PATIENT-LVL III: ICD-10-PCS | Mod: PBBFAC,,, | Performed by: PSYCHIATRY & NEUROLOGY

## 2022-09-26 PROCEDURE — 99215 PR OFFICE/OUTPT VISIT, EST, LEVL V, 40-54 MIN: ICD-10-PCS | Mod: S$GLB,,, | Performed by: PSYCHIATRY & NEUROLOGY

## 2022-09-26 PROCEDURE — 99999 PR PBB SHADOW E&M-EST. PATIENT-LVL III: CPT | Mod: PBBFAC,,, | Performed by: PSYCHIATRY & NEUROLOGY

## 2022-09-26 PROCEDURE — 1111F PR DISCHARGE MEDS RECONCILED W/ CURRENT OUTPATIENT MED LIST: ICD-10-PCS | Mod: CPTII,S$GLB,, | Performed by: PSYCHIATRY & NEUROLOGY

## 2022-09-26 PROCEDURE — 99215 OFFICE O/P EST HI 40 MIN: CPT | Mod: S$GLB,,, | Performed by: PSYCHIATRY & NEUROLOGY

## 2022-09-26 PROCEDURE — 99215 OFFICE O/P EST HI 40 MIN: CPT | Mod: S$PBB | Performed by: PSYCHIATRY & NEUROLOGY

## 2022-09-26 PROCEDURE — 99213 OFFICE O/P EST LOW 20 MIN: CPT | Mod: PBBFAC | Performed by: PSYCHIATRY & NEUROLOGY

## 2022-09-26 RX ORDER — ASPIRIN/CALCIUM CARB/MAGNESIUM 325 MG
4 TABLET ORAL
COMMUNITY
End: 2023-09-30

## 2022-09-26 RX ORDER — ACETAMINOPHEN AND PHENYLEPHRINE HCL 325; 5 MG/1; MG/1
5000 TABLET ORAL DAILY
Status: ON HOLD | COMMUNITY
End: 2023-06-04 | Stop reason: HOSPADM
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
## 2022-09-26 NOTE — TELEPHONE ENCOUNTER
Contacted patients wife Sandy to schedule appointment for tomorrow, 9/27/22, at 4:30. Sandy states that the patient is continuing to have about 5 seizures daily. Sandy states that the patient was admitted to the hospital over the weekend and Keppra was discontinued and they were told to follow up with neurology. Currently, the patient is not taking any seizure medication although Vimpat is on the medication list. Please review chart and advise.

## 2022-09-27 LAB
PYRIDOXAL SERPL-MCNC: 5 UG/L (ref 5–50)
VIT B1 BLD-MCNC: 73 UG/L (ref 38–122)

## 2022-09-28 ENCOUNTER — TELEPHONE (OUTPATIENT)
Dept: NEUROLOGY | Facility: CLINIC | Age: 47
End: 2022-09-28
Payer: COMMERCIAL

## 2022-09-28 NOTE — TELEPHONE ENCOUNTER
Spoke with patient wife, states patient is stating that he thinks the Vimpat BID is helping however the wife finds the seizures are more physical and emotional than they were before the medication. States she is worried about his constant sadness with the seizures. Reports seizure activity has not lessened.

## 2022-09-28 NOTE — TELEPHONE ENCOUNTER
----- Message from Yris Becerril sent at 2022 11:03 AM CDT -----  Contact: Sandy/Spouse  Luis Daniel Dino Wynne Jr.  MRN: [id number]  : 1975  PCP: Zenobia Dunbar  Home Phone      [phone]  Work Phone      Not on file.  Mobile          [phone]      MESSAGE: Have questions an concerns about his condition.        Phone [phone]

## 2022-09-29 NOTE — TELEPHONE ENCOUNTER
It will take some time for the vimpat to work and I plan to increase the dose at his visit next week.  If the sadness is only related to the seizures and not between spells, continue on same dose with plan for follow up next week.  Seizure medications can take several weeks for the best effects.

## 2022-10-04 ENCOUNTER — PATIENT OUTREACH (OUTPATIENT)
Dept: ADMINISTRATIVE | Facility: HOSPITAL | Age: 47
End: 2022-10-04
Payer: COMMERCIAL

## 2022-10-04 ENCOUNTER — PATIENT MESSAGE (OUTPATIENT)
Dept: INTERNAL MEDICINE | Facility: CLINIC | Age: 47
End: 2022-10-04
Payer: COMMERCIAL

## 2022-10-04 ENCOUNTER — OFFICE VISIT (OUTPATIENT)
Dept: NEUROLOGY | Facility: CLINIC | Age: 47
End: 2022-10-04
Payer: COMMERCIAL

## 2022-10-04 VITALS
HEART RATE: 88 BPM | DIASTOLIC BLOOD PRESSURE: 64 MMHG | OXYGEN SATURATION: 97 % | RESPIRATION RATE: 16 BRPM | SYSTOLIC BLOOD PRESSURE: 128 MMHG | WEIGHT: 160.5 LBS | BODY MASS INDEX: 22.98 KG/M2 | HEIGHT: 70 IN

## 2022-10-04 DIAGNOSIS — E53.8 B12 DEFICIENCY: ICD-10-CM

## 2022-10-04 DIAGNOSIS — R56.9 SEIZURE-LIKE ACTIVITY: Primary | ICD-10-CM

## 2022-10-04 DIAGNOSIS — R79.89 LOW TESTOSTERONE: ICD-10-CM

## 2022-10-04 PROCEDURE — 99214 OFFICE O/P EST MOD 30 MIN: CPT | Mod: S$GLB,,, | Performed by: PSYCHIATRY & NEUROLOGY

## 2022-10-04 PROCEDURE — 1111F DSCHRG MED/CURRENT MED MERGE: CPT | Mod: CPTII,S$GLB,, | Performed by: PSYCHIATRY & NEUROLOGY

## 2022-10-04 PROCEDURE — 1111F PR DISCHARGE MEDS RECONCILED W/ CURRENT OUTPATIENT MED LIST: ICD-10-PCS | Mod: CPTII,S$GLB,, | Performed by: PSYCHIATRY & NEUROLOGY

## 2022-10-04 PROCEDURE — 99999 PR PBB SHADOW E&M-EST. PATIENT-LVL IV: CPT | Mod: PBBFAC,,, | Performed by: PSYCHIATRY & NEUROLOGY

## 2022-10-04 PROCEDURE — 99214 OFFICE O/P EST MOD 30 MIN: CPT | Mod: PBBFAC | Performed by: PSYCHIATRY & NEUROLOGY

## 2022-10-04 PROCEDURE — 99214 OFFICE O/P EST MOD 30 MIN: CPT | Mod: S$PBB | Performed by: PSYCHIATRY & NEUROLOGY

## 2022-10-04 PROCEDURE — 99999 PR PBB SHADOW E&M-EST. PATIENT-LVL IV: ICD-10-PCS | Mod: PBBFAC,,, | Performed by: PSYCHIATRY & NEUROLOGY

## 2022-10-04 PROCEDURE — 99214 PR OFFICE/OUTPT VISIT, EST, LEVL IV, 30-39 MIN: ICD-10-PCS | Mod: S$GLB,,, | Performed by: PSYCHIATRY & NEUROLOGY

## 2022-10-04 RX ORDER — LACOSAMIDE 100 MG/1
100 TABLET ORAL EVERY 12 HOURS
Qty: 60 TABLET | Refills: 5 | Status: SHIPPED | OUTPATIENT
Start: 2022-10-04 | End: 2022-11-16

## 2022-10-04 RX ORDER — TRAZODONE HYDROCHLORIDE 50 MG/1
50 TABLET ORAL NIGHTLY
COMMUNITY
Start: 2022-09-06 | End: 2023-02-10

## 2022-10-04 NOTE — PATIENT INSTRUCTIONS
Raise Vimpat dose to 50mg in the am and 100mg at night for 2 weeks,. Then increased to 100mg twice daily as written on the new prescription.
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
## 2022-10-12 NOTE — PROCEDURES
DATE: 9/22/22    EEG NUMBER:  ON     REFERRING PHYSICIAN:  Dr. Grace      This EEG was performed to assess for evidence of underlying epilepsy.     ELECTROENCEPHALOGRAM REPORT     METHODOLOGY:  Electroencephalographic (EEG) recording is with electrodes placed according to the International 10-20 placement system.  Thirty two (32) channels of digital signal are simultaneously recorded from the scalp and may include EKG, EMG, and/or eye monitors.   Recording band pass was 0.1 to 512 hz.  Digital video recording of the patient is simultaneously recorded with the EEG.  The staff report clinical symptoms and may press an event button when the patient has symptoms of clinical interest to the treating physicians.  EEG and video recording is stored and archived in digital format.  The entire recording is visually reviewed, and the times identified by computer analysis as being spikes or seizures are reviewed again.  Activation procedures which include photic stimulation, hyperventilation and instructing patients to perform simple task are done in selected patients.   Compresses spectral analysis (CSA) is also performed on the activity recorded from each individual channel.  This is displayed as a power display of frequencies from 0 to 30 Hz over time.   The CSA analysis is done and displayed continuously.  This is reviewed for asymmetries in power between homologous areas of the scalp and for presence of changes in power which can be seen when seizures occur.  Sections of suspected abnormalities on the CSA is then compared with the original EEG recording.                [employer] software was also utilized in the review of this study.  This software suite analyzes the EEG recording in multiple domains.  Coherence and rhythmicity is computed to identify EEG sections which may contain organized seizures.  Each channel undergoes analysis to detect presence of spike and sharp waves which have special and morphological  characteristic of epileptic activity.  The routine EEG recording is converted from spacial into frequency domain.  This is then displayed comparing homologous areas to identify areas of significant asymmetry.  Algorithm to identify non-cortically generated artifact is used to separate eye movement, EMG and other artifact from the EEG.     EEG FINDINGS:  The recording was obtained with a number of standard bipolar and referential montages during wakefulness and drowsiness.  In the alert state, the posterior background rhythm was a symmetric, well-modulated 9 to 10 Hz alpha rhythm, which reacted symmetrically to eye opening.  Intermittent photic stimulation evoked symmetric posterior driving responses.  No abnormalities were activated by photic stimulation.  During drowsiness, the background rhythm waxed and waned and there were periods of slowing.  Intermittent right frontotemporal focal mixed delta range slowing was noted.  In addition frequent sharp waves were noted maximally at F8-T4-T6  The EKG channel revealed a sinus rhythm.     IMPRESSION:  This is an abnormal EEG during wakefulness and drowsiness.  Right frontotemporal focal intermittent slowing was noted.  Right frontotemporal maximum sharp waves were also seen     CLINICAL CORRELATION:  The patient is a  46-year-old male who is being evaluated for a witnessed seizure.  The patient is currently not maintained on any antiseizure medications.  This is an abnormal EEG during wakefulness and drowsiness.  The presence of right frontotemporal focal intermittent slowing suggestive focal neural dysfunction in this region.  The presence of sharp waves in the same region confer an increased risk for focal seizures from this region.  This study no seizures were recorded.

## 2022-10-13 ENCOUNTER — TELEPHONE (OUTPATIENT)
Dept: NEUROLOGY | Facility: CLINIC | Age: 47
End: 2022-10-13
Payer: COMMERCIAL

## 2022-10-13 NOTE — TELEPHONE ENCOUNTER
MD NOTE: Noting final EEG report (routine, inpatient) reported right frontotemporal slowing intermittently with sharp waves. Will go forward with prolonged EEG as planned.

## 2022-10-24 ENCOUNTER — TELEPHONE (OUTPATIENT)
Dept: NEUROLOGY | Facility: CLINIC | Age: 47
End: 2022-10-24
Payer: COMMERCIAL

## 2022-10-24 NOTE — TELEPHONE ENCOUNTER
"Notified patient home EEG will be 11/2-5/2022 per [facility] and of 's recommendations, voiced understanding. States "just not sleeping".  "

## 2022-10-24 NOTE — TELEPHONE ENCOUNTER
----- Message from Kami Mosley sent at 10/24/2022 11:02 AM CDT -----  Contact: PATIENT  Luis Daniel Wynne Jr.  MRN: [id number]  : 1975  PCP: Zenobia Dunbar  Home Phone      [phone]  Work Phone      Not on file.  Mobile          [phone]      MESSAGE: Patient states that he has been having a lot of seizures.  He also states that he has not eaten x 3 days and is having trouble sleeping.  He does not have an appointment until 22.  He would like to know what Dr. Sarabia recommends that he do.        Phone: [phone]

## 2022-10-24 NOTE — TELEPHONE ENCOUNTER
Patient sounds almost manic on the phone stating that he had a horrible seizure on Thursday and has not been able to eat since then and cannot sleep, is shaking all of the time ,and does not feel like he can continue like this. He feels like he is in a constant seizure since Thursday. He is asking for further recommendations at this time. Next OV 11/6/2022. Please advise.

## 2022-10-24 NOTE — TELEPHONE ENCOUNTER
When is his home video EEG going to be? This is the next step along with the recent increase in Vimpat from my standpoint.  If he is having severe psychiatric symptoms, please refer him to his treating psychiatrist who the patient sees for anxiety.  If he can't get in touch with the psychiatrist and feels gravely disabling with psychiatric symptoms, he should report to the ER

## 2022-11-02 ENCOUNTER — HOSPITAL ENCOUNTER (OUTPATIENT)
Dept: NEUROLOGY | Facility: HOSPITAL | Age: 47
Discharge: HOME | End: 2022-11-02
Attending: PSYCHIATRY & NEUROLOGY
Payer: COMMERCIAL

## 2022-11-02 DIAGNOSIS — R56.9 GENERALIZED-ONSET SEIZURES: Primary | ICD-10-CM

## 2022-11-02 PROCEDURE — 95700 EEG CONT REC W/VID EEG TECH: CPT

## 2022-11-02 PROCEDURE — 95714 VEEG EA 12-26 HR UNMNTR: CPT

## 2022-11-03 ENCOUNTER — HOSPITAL ENCOUNTER (OUTPATIENT)
Dept: NEUROLOGY | Facility: HOSPITAL | Age: 47
Discharge: HOME | End: 2022-11-03
Attending: PSYCHIATRY & NEUROLOGY
Payer: COMMERCIAL

## 2022-11-03 PROCEDURE — 95714 VEEG EA 12-26 HR UNMNTR: CPT

## 2022-11-04 ENCOUNTER — HOSPITAL ENCOUNTER (OUTPATIENT)
Dept: NEUROLOGY | Facility: HOSPITAL | Age: 47
Discharge: HOME | End: 2022-11-04
Attending: PSYCHIATRY & NEUROLOGY
Payer: COMMERCIAL

## 2022-11-04 PROCEDURE — 95714 VEEG EA 12-26 HR UNMNTR: CPT

## 2022-11-05 DIAGNOSIS — Z12.11 SPECIAL SCREENING FOR MALIGNANT NEOPLASMS, COLON: Primary | ICD-10-CM

## 2022-11-11 ENCOUNTER — OFFICE VISIT (OUTPATIENT)
Dept: INTERNAL MEDICINE | Facility: CLINIC | Age: 47
End: 2022-11-11
Payer: COMMERCIAL

## 2022-11-11 VITALS
WEIGHT: 162 LBS | HEIGHT: 70 IN | HEART RATE: 83 BPM | DIASTOLIC BLOOD PRESSURE: 78 MMHG | SYSTOLIC BLOOD PRESSURE: 120 MMHG | BODY MASS INDEX: 23.19 KG/M2 | OXYGEN SATURATION: 99 % | TEMPERATURE: 97 F

## 2022-11-11 DIAGNOSIS — G40.909 SEIZURE DISORDER: Primary | ICD-10-CM

## 2022-11-11 PROCEDURE — 99214 OFFICE O/P EST MOD 30 MIN: CPT | Mod: PBBFAC,PO | Performed by: INTERNAL MEDICINE

## 2022-11-11 PROCEDURE — 99999 PR PBB SHADOW E&M-EST. PATIENT-LVL IV: CPT | Mod: PBBFAC,,, | Performed by: INTERNAL MEDICINE

## 2022-11-11 PROCEDURE — 99213 OFFICE O/P EST LOW 20 MIN: CPT | Mod: S$GLB,,, | Performed by: INTERNAL MEDICINE

## 2022-11-11 PROCEDURE — 99213 PR OFFICE/OUTPT VISIT, EST, LEVL III, 20-29 MIN: ICD-10-PCS | Mod: S$GLB,,, | Performed by: INTERNAL MEDICINE

## 2022-11-11 PROCEDURE — 99999 PR PBB SHADOW E&M-EST. PATIENT-LVL IV: ICD-10-PCS | Mod: PBBFAC,,, | Performed by: INTERNAL MEDICINE

## 2022-11-11 NOTE — PROGRESS NOTES
CC: followup of seizure disorder  HPI:  The patient is a 46 y.o. year old male who presents to the office for followup of seizure disorder.  He reports memory problems that started last winter.  His symptoms seem to worsen.  Seizure activity started about 2-3 months ago.  He lost consciousness.  [place].  He reports seizure activity at night is associated  with sweating.  He reports seizure activity that occurs at night is associated with fatigue and difficulty eating for up to 7 days.  Workup thus far has been negative, awaiting EEG results.    The patient's wife reports he has had diarrhea recently.    PAST MEDICAL HISTORY:  Past Medical History:   Diagnosis Date    Depression     Fibromyalgia     Marijuana use     Peptic ulcer     Scoliosis        SURGICAL HISTORY:  Past Surgical History:   Procedure Laterality Date    KNEE ARTHROSCOPY         MEDS:  Medcard reviewed and updated    ALLERGIES: Allergy Card reviewed and updated    SOCIAL HISTORY:   The patient is a nonsmoker.    PE:   APPEARANCE: Well nourished, well developed, in no acute distress.    CHEST: Lungs clear to auscultation with unlabored respirations.  CARDIOVASCULAR: Normal S1, S2. No murmurs. No carotid bruits. No pedal edema.  ABDOMEN: Bowel sounds normal. Not distended. Soft. No tenderness or masses.   PSYCHIATRIC: The patient is oriented to person, place, and time and has a pleasant affect.        ASSESSMENT/PLAN:  Luis Daniel was seen today for seizures and memory loss.    Diagnoses and all orders for this visit:    Seizure disorder  -     Ambulatory referral/consult to Home Health; Future

## 2022-11-15 ENCOUNTER — PATIENT OUTREACH (OUTPATIENT)
Dept: ADMINISTRATIVE | Facility: HOSPITAL | Age: 47
End: 2022-11-15
Payer: COMMERCIAL

## 2022-11-15 ENCOUNTER — OFFICE VISIT (OUTPATIENT)
Dept: NEUROLOGY | Facility: CLINIC | Age: 47
End: 2022-11-15
Payer: COMMERCIAL

## 2022-11-15 VITALS
RESPIRATION RATE: 16 BRPM | WEIGHT: 166.69 LBS | HEIGHT: 70 IN | BODY MASS INDEX: 23.86 KG/M2 | HEART RATE: 100 BPM | SYSTOLIC BLOOD PRESSURE: 124 MMHG | DIASTOLIC BLOOD PRESSURE: 88 MMHG

## 2022-11-15 DIAGNOSIS — R56.9 SEIZURE-LIKE ACTIVITY: Primary | ICD-10-CM

## 2022-11-15 DIAGNOSIS — R94.01 ABNORMAL EEG: ICD-10-CM

## 2022-11-15 DIAGNOSIS — E53.8 B12 DEFICIENCY: ICD-10-CM

## 2022-11-15 PROCEDURE — 99214 PR OFFICE/OUTPT VISIT, EST, LEVL IV, 30-39 MIN: ICD-10-PCS | Mod: S$GLB,,, | Performed by: PSYCHIATRY & NEUROLOGY

## 2022-11-15 PROCEDURE — 99999 PR PBB SHADOW E&M-EST. PATIENT-LVL III: CPT | Mod: PBBFAC,,, | Performed by: PSYCHIATRY & NEUROLOGY

## 2022-11-15 PROCEDURE — 1160F RVW MEDS BY RX/DR IN RCRD: CPT | Mod: CPTII,S$GLB,, | Performed by: PSYCHIATRY & NEUROLOGY

## 2022-11-15 PROCEDURE — 1159F PR MEDICATION LIST DOCUMENTED IN MEDICAL RECORD: ICD-10-PCS | Mod: CPTII,S$GLB,, | Performed by: PSYCHIATRY & NEUROLOGY

## 2022-11-15 PROCEDURE — 3008F BODY MASS INDEX DOCD: CPT | Mod: CPTII,S$GLB,, | Performed by: PSYCHIATRY & NEUROLOGY

## 2022-11-15 PROCEDURE — 99999 PR PBB SHADOW E&M-EST. PATIENT-LVL III: ICD-10-PCS | Mod: PBBFAC,,, | Performed by: PSYCHIATRY & NEUROLOGY

## 2022-11-15 PROCEDURE — 1160F PR REVIEW ALL MEDS BY PRESCRIBER/CLIN PHARMACIST DOCUMENTED: ICD-10-PCS | Mod: CPTII,S$GLB,, | Performed by: PSYCHIATRY & NEUROLOGY

## 2022-11-15 PROCEDURE — 3074F SYST BP LT 130 MM HG: CPT | Mod: CPTII,S$GLB,, | Performed by: PSYCHIATRY & NEUROLOGY

## 2022-11-15 PROCEDURE — 3079F DIAST BP 80-89 MM HG: CPT | Mod: CPTII,S$GLB,, | Performed by: PSYCHIATRY & NEUROLOGY

## 2022-11-15 PROCEDURE — 99214 OFFICE O/P EST MOD 30 MIN: CPT | Mod: S$GLB,,, | Performed by: PSYCHIATRY & NEUROLOGY

## 2022-11-15 PROCEDURE — 1159F MED LIST DOCD IN RCRD: CPT | Mod: CPTII,S$GLB,, | Performed by: PSYCHIATRY & NEUROLOGY

## 2022-11-15 PROCEDURE — 3079F PR MOST RECENT DIASTOLIC BLOOD PRESSURE 80-89 MM HG: ICD-10-PCS | Mod: CPTII,S$GLB,, | Performed by: PSYCHIATRY & NEUROLOGY

## 2022-11-15 PROCEDURE — 3008F PR BODY MASS INDEX (BMI) DOCUMENTED: ICD-10-PCS | Mod: CPTII,S$GLB,, | Performed by: PSYCHIATRY & NEUROLOGY

## 2022-11-15 PROCEDURE — 3074F PR MOST RECENT SYSTOLIC BLOOD PRESSURE < 130 MM HG: ICD-10-PCS | Mod: CPTII,S$GLB,, | Performed by: PSYCHIATRY & NEUROLOGY
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
## 2022-11-16 ENCOUNTER — TELEPHONE (OUTPATIENT)
Dept: NEUROLOGY | Facility: CLINIC | Age: 47
End: 2022-11-16
Payer: COMMERCIAL

## 2022-11-16 RX ORDER — LACOSAMIDE 150 MG/1
150 TABLET ORAL EVERY 12 HOURS
Qty: 60 TABLET | Refills: 5 | Status: SHIPPED | OUTPATIENT
Start: 2022-11-16 | End: 2022-12-20

## 2022-11-16 NOTE — TELEPHONE ENCOUNTER
Notify patient: His EEG monitor shows seizure discharges suggesting epilepsy.  Lets raise his Vimpat dose to give him more protection from future seizures as follow:    100mg in the am and 150mg at nightly for 2 weeks  Then 150mg BID.    New Rx sent.

## 2022-11-17 ENCOUNTER — PATIENT MESSAGE (OUTPATIENT)
Dept: NEUROLOGY | Facility: CLINIC | Age: 47
End: 2022-11-17
Payer: COMMERCIAL

## 2022-12-19 ENCOUNTER — TELEPHONE (OUTPATIENT)
Dept: NEUROLOGY | Facility: CLINIC | Age: 47
End: 2022-12-19
Payer: COMMERCIAL

## 2022-12-19 DIAGNOSIS — R56.9 SEIZURE-LIKE ACTIVITY: Primary | ICD-10-CM

## 2022-12-19 NOTE — TELEPHONE ENCOUNTER
Patient's spouse stated that he has been doing good on medication for seizures and memory has been good, however the last 1-2 weeks he has been having seizures and has been in bed the last 2-3 days with memory loss.  Spouse would like to know if they need to be seen sooner than their visit in Feb.  Or if provider can advise on what to do now.

## 2022-12-19 NOTE — TELEPHONE ENCOUNTER
----- Message from Yris Becerril sent at 2022 10:14 AM CST -----  Contact: consuelo/spouse  Luis Daniel Wynne Jr.  MRN: [id number]  : 1975  PCP: Zenobia Dunbar  Home Phone      [phone]  Work Phone      Not on file.  Mobile          [phone]      MESSAGE: Have questions and concerns about his medical condition asking for a nurse to call her back       Phone [phone]

## 2022-12-20 RX ORDER — LACOSAMIDE 200 MG/1
200 TABLET ORAL EVERY 12 HOURS
Qty: 60 TABLET | Refills: 5 | Status: SHIPPED | OUTPATIENT
Start: 2022-12-20 | End: 2022-12-20 | Stop reason: SDUPTHER

## 2022-12-20 NOTE — TELEPHONE ENCOUNTER
I am going to increase his Vimpat dosing to 200mg BID over 3 weeks.     That is: raise Vimpat to 150mg in the am (current dose) and 200mg at night for 3 weeks then 200mg BID    New Rx for 200mg BID sent    Also-  Due to the prolonged nature of these spells and the fact that they keep recurring despite medication increases, I do want to consult epileptology to see if we are treating the correct way here      Please arrange

## 2022-12-20 NOTE — TELEPHONE ENCOUNTER
Sandy states that the only thing that she witnessed was on Saturday where he sat up in the bed and started saying odd things-lasted about 30 seconds.     Patient has been in the bed from Friday to Sunday. He has no memory of anything from Tuesday to Sunday. Wife is assuming this is due to seizures. Per wife, patient has not missed any doses of medications and had been doing well. Today the patient is up and alert.

## 2022-12-22 ENCOUNTER — TELEPHONE (OUTPATIENT)
Dept: NEUROLOGY | Facility: CLINIC | Age: 47
End: 2022-12-22
Payer: COMMERCIAL

## 2022-12-22 NOTE — TELEPHONE ENCOUNTER
Patients wife Sandy notified, able to verbalize understanding. Referral to Epileptology sent via [email or web address] message.

## 2022-12-29 NOTE — TELEPHONE ENCOUNTER
Patient attempted to return call to Judith Darnell MA but did not have a number to call back, please contact patient at [phone].

## 2023-01-19 ENCOUNTER — TELEPHONE (OUTPATIENT)
Dept: NEUROLOGY | Facility: CLINIC | Age: 48
End: 2023-01-19
Payer: COMMERCIAL

## 2023-01-19 NOTE — TELEPHONE ENCOUNTER
----- Message from Kami Mosley sent at 2023 11:24 AM CST -----  Contact: GIRLFRIEND - SANDY Wynne Jr.  MRN: [id number]  : 1975  PCP: Zenobia Dunbar  Home Phone      [phone]  Work Phone      Not on file.  Mobile          [phone]      MESSAGE: Sandy thinks that the patient is having seizures even with the increase in the dosage of his medication.  She states that they do not know when he is having the seizure but he acts different when she thinks that he might be having one. When he comes around he does not know where he is, what day it is & laughs uncontrollably.  He is being seen by the specialist that Dr. Sarabia referred him to in 2 weeks.          Phone: [phone]

## 2023-01-19 NOTE — TELEPHONE ENCOUNTER
I don't think the vimpat should not be causing seizures. Please continue the current dose until seeing the epileptologist as scheduled.  Continue seizure precautions at home as outlined in my last clinic note

## 2023-01-25 ENCOUNTER — PATIENT MESSAGE (OUTPATIENT)
Dept: INTERNAL MEDICINE | Facility: CLINIC | Age: 48
End: 2023-01-25
Payer: COMMERCIAL

## 2023-01-25 PROBLEM — R56.9 GENERALIZED-ONSET SEIZURES: Status: ACTIVE | Noted: 2023-01-25

## 2023-02-03 ENCOUNTER — OFFICE VISIT (OUTPATIENT)
Dept: NEUROLOGY | Facility: CLINIC | Age: 48
End: 2023-02-03
Payer: COMMERCIAL

## 2023-02-03 ENCOUNTER — LAB VISIT (OUTPATIENT)
Dept: LAB | Facility: HOSPITAL | Age: 48
End: 2023-02-03
Attending: STUDENT IN AN ORGANIZED HEALTH CARE EDUCATION/TRAINING PROGRAM
Payer: COMMERCIAL

## 2023-02-03 VITALS
WEIGHT: 172 LBS | HEIGHT: 71 IN | DIASTOLIC BLOOD PRESSURE: 81 MMHG | SYSTOLIC BLOOD PRESSURE: 138 MMHG | HEART RATE: 88 BPM | BODY MASS INDEX: 24.08 KG/M2

## 2023-02-03 DIAGNOSIS — R56.9 SEIZURE-LIKE ACTIVITY: Primary | ICD-10-CM

## 2023-02-03 DIAGNOSIS — R56.9 SEIZURE-LIKE ACTIVITY: ICD-10-CM

## 2023-02-03 PROCEDURE — 3079F DIAST BP 80-89 MM HG: CPT | Mod: CPTII,S$GLB,, | Performed by: STUDENT IN AN ORGANIZED HEALTH CARE EDUCATION/TRAINING PROGRAM

## 2023-02-03 PROCEDURE — 80235 DRUG ASSAY LACOSAMIDE: CPT | Performed by: STUDENT IN AN ORGANIZED HEALTH CARE EDUCATION/TRAINING PROGRAM

## 2023-02-03 PROCEDURE — 99999 PR PBB SHADOW E&M-EST. PATIENT-LVL IV: ICD-10-PCS | Mod: PBBFAC,,, | Performed by: STUDENT IN AN ORGANIZED HEALTH CARE EDUCATION/TRAINING PROGRAM

## 2023-02-03 PROCEDURE — 99215 OFFICE O/P EST HI 40 MIN: CPT | Mod: S$GLB,,, | Performed by: STUDENT IN AN ORGANIZED HEALTH CARE EDUCATION/TRAINING PROGRAM

## 2023-02-03 PROCEDURE — 99999 PR PBB SHADOW E&M-EST. PATIENT-LVL IV: CPT | Mod: PBBFAC,,, | Performed by: STUDENT IN AN ORGANIZED HEALTH CARE EDUCATION/TRAINING PROGRAM

## 2023-02-03 PROCEDURE — 36415 COLL VENOUS BLD VENIPUNCTURE: CPT | Performed by: STUDENT IN AN ORGANIZED HEALTH CARE EDUCATION/TRAINING PROGRAM

## 2023-02-03 PROCEDURE — 3075F SYST BP GE 130 - 139MM HG: CPT | Mod: CPTII,S$GLB,, | Performed by: STUDENT IN AN ORGANIZED HEALTH CARE EDUCATION/TRAINING PROGRAM

## 2023-02-03 PROCEDURE — 3008F BODY MASS INDEX DOCD: CPT | Mod: CPTII,S$GLB,, | Performed by: STUDENT IN AN ORGANIZED HEALTH CARE EDUCATION/TRAINING PROGRAM

## 2023-02-03 PROCEDURE — 3008F PR BODY MASS INDEX (BMI) DOCUMENTED: ICD-10-PCS | Mod: CPTII,S$GLB,, | Performed by: STUDENT IN AN ORGANIZED HEALTH CARE EDUCATION/TRAINING PROGRAM

## 2023-02-03 PROCEDURE — 99215 PR OFFICE/OUTPT VISIT, EST, LEVL V, 40-54 MIN: ICD-10-PCS | Mod: S$GLB,,, | Performed by: STUDENT IN AN ORGANIZED HEALTH CARE EDUCATION/TRAINING PROGRAM

## 2023-02-03 PROCEDURE — 3079F PR MOST RECENT DIASTOLIC BLOOD PRESSURE 80-89 MM HG: ICD-10-PCS | Mod: CPTII,S$GLB,, | Performed by: STUDENT IN AN ORGANIZED HEALTH CARE EDUCATION/TRAINING PROGRAM

## 2023-02-03 PROCEDURE — 3075F PR MOST RECENT SYSTOLIC BLOOD PRESS GE 130-139MM HG: ICD-10-PCS | Mod: CPTII,S$GLB,, | Performed by: STUDENT IN AN ORGANIZED HEALTH CARE EDUCATION/TRAINING PROGRAM

## 2023-02-03 RX ORDER — LEVETIRACETAM 500 MG/1
500 TABLET ORAL 2 TIMES DAILY
Qty: 60 TABLET | Refills: 11 | Status: ON HOLD | OUTPATIENT
Start: 2023-02-03 | End: 2023-06-04 | Stop reason: HOSPADM

## 2023-02-03 NOTE — PATIENT INSTRUCTIONS
VISIT FOLLOW UP    It was nice to see you today.  Here is what we discussed at your visit:     You were seen today for your seizure like events.  Your EEGs in the past have been abnormal and you may have epilepsy, but you may also have non-epileptic spells.    I would like to schedule you for an admission in the epilepsy monitoring unit (EMU).  The EMU is a specialized unit in the hospital that only takes care of patients who have seizures or seizure like events.  The goal of an EMU admission is to record an event to better understand what changes are occurring in the brain's electrical activity.  This allows us to identify whether a patient's events are epileptic seizures or another type of event.  If the events are seizures, we can also learn what area of the brain the seizures are starting from.      EMU admissions are typically 3-5 days, although they can be as long as 7 days.  During the admission, we will typically stop or lower any anti-seizure medications you are taking in order to increase the chance of recording a seizure.  This is done in a safe environment where you are being monitored 24 hours/day, so it is safer to discontinue seizure medications inpatient rather than at home.  I feel this admission is necessary to better understand and treat your seizures.  Our epilepsy nurse will contact you to schedule this admission.     We will start a medication called Keppra.  Take 500 mg (1 tablet) twice a day.  Sometimes this medication can cause some irritability.  Please contact me if you have any questions or further seizures.  Follow up in 2-3 months (after EMU admission).     Dr. FAIRBANKS's contact information: office phone [phone], or contact via [facility]    Seizure precautions:    For emergencies, please call 911 or proceed directly to the nearest emergency room only if you can safely do so.    Seizures may happen at any time. It is important to take certain precautions to maintain your safety.     You  should not drive unless you have been cleared by your physicians as well as the [facility].     When possible, take showers instead of baths, as it is possible to drown in even shallow water during a seizure. Do not swim unsupervised or in open water where rescue could be difficult. Do not climb to heights and do not operate heavy machinery. When cooking, use the back burners of the stove and avoid open flames or hot stove tops. Avoid any activities which could be dangerous in the event of a loss of consciousness.  .
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
## 2023-02-06 ENCOUNTER — TELEPHONE (OUTPATIENT)
Dept: ENDOSCOPY | Facility: HOSPITAL | Age: 48
End: 2023-02-06

## 2023-02-06 ENCOUNTER — CLINICAL SUPPORT (OUTPATIENT)
Dept: ENDOSCOPY | Facility: HOSPITAL | Age: 48
End: 2023-02-06
Attending: INTERNAL MEDICINE
Payer: COMMERCIAL

## 2023-02-06 VITALS — HEIGHT: 71 IN | BODY MASS INDEX: 24.08 KG/M2 | WEIGHT: 172 LBS

## 2023-02-06 DIAGNOSIS — Z12.11 SPECIAL SCREENING FOR MALIGNANT NEOPLASMS, COLON: ICD-10-CM

## 2023-02-06 NOTE — TELEPHONE ENCOUNTER
Good Morning,    Patient has a referral for a colonoscopy. Is patient cleared from a neurology standpoint to proceed with colonoscopy? Please advise.     Thanks,    TOMAS Jonier

## 2023-02-06 NOTE — PLAN OF CARE
Contacted patient for 11:00 am PAT appointment to schedule colonoscopy. Patient is being seen by neurology. Clearance requested. Will call patient back once clearance received.

## 2023-02-07 ENCOUNTER — TELEPHONE (OUTPATIENT)
Dept: NEUROLOGY | Facility: CLINIC | Age: 48
End: 2023-02-07
Payer: COMMERCIAL

## 2023-02-07 LAB — LACOSAMIDE: 7.5 MCG/ML (ref 1–10)

## 2023-02-07 NOTE — TELEPHONE ENCOUNTER
Spoke with patient about scheduling EMU- he prefers May. He is aware an adult is required to accompany him for the duration including overnight. I have agreed to call him once May scheduling has opened.

## 2023-02-10 ENCOUNTER — OFFICE VISIT (OUTPATIENT)
Dept: INTERNAL MEDICINE | Facility: CLINIC | Age: 48
End: 2023-02-10
Payer: COMMERCIAL

## 2023-02-10 VITALS
SYSTOLIC BLOOD PRESSURE: 130 MMHG | RESPIRATION RATE: 18 BRPM | DIASTOLIC BLOOD PRESSURE: 88 MMHG | HEIGHT: 71 IN | BODY MASS INDEX: 24.39 KG/M2 | TEMPERATURE: 97 F | OXYGEN SATURATION: 96 % | WEIGHT: 174.19 LBS | HEART RATE: 97 BPM

## 2023-02-10 DIAGNOSIS — R79.89 LOW TESTOSTERONE: Primary | ICD-10-CM

## 2023-02-10 DIAGNOSIS — R61 HYPERHIDROSIS: ICD-10-CM

## 2023-02-10 PROCEDURE — 99999 PR PBB SHADOW E&M-EST. PATIENT-LVL V: CPT | Mod: PBBFAC,,, | Performed by: INTERNAL MEDICINE

## 2023-02-10 PROCEDURE — 3075F PR MOST RECENT SYSTOLIC BLOOD PRESS GE 130-139MM HG: ICD-10-PCS | Mod: CPTII,S$GLB,, | Performed by: INTERNAL MEDICINE

## 2023-02-10 PROCEDURE — 99213 OFFICE O/P EST LOW 20 MIN: CPT | Mod: S$GLB,,, | Performed by: INTERNAL MEDICINE

## 2023-02-10 PROCEDURE — 99999 PR PBB SHADOW E&M-EST. PATIENT-LVL V: ICD-10-PCS | Mod: PBBFAC,,, | Performed by: INTERNAL MEDICINE

## 2023-02-10 PROCEDURE — 3008F PR BODY MASS INDEX (BMI) DOCUMENTED: ICD-10-PCS | Mod: CPTII,S$GLB,, | Performed by: INTERNAL MEDICINE

## 2023-02-10 PROCEDURE — 99213 PR OFFICE/OUTPT VISIT, EST, LEVL III, 20-29 MIN: ICD-10-PCS | Mod: S$GLB,,, | Performed by: INTERNAL MEDICINE

## 2023-02-10 PROCEDURE — 3008F BODY MASS INDEX DOCD: CPT | Mod: CPTII,S$GLB,, | Performed by: INTERNAL MEDICINE

## 2023-02-10 PROCEDURE — 3079F PR MOST RECENT DIASTOLIC BLOOD PRESSURE 80-89 MM HG: ICD-10-PCS | Mod: CPTII,S$GLB,, | Performed by: INTERNAL MEDICINE

## 2023-02-10 PROCEDURE — 3075F SYST BP GE 130 - 139MM HG: CPT | Mod: CPTII,S$GLB,, | Performed by: INTERNAL MEDICINE

## 2023-02-10 PROCEDURE — 3079F DIAST BP 80-89 MM HG: CPT | Mod: CPTII,S$GLB,, | Performed by: INTERNAL MEDICINE

## 2023-02-10 NOTE — PROGRESS NOTES
CC: followup of seizure-like activity  HPI:  The patient is a 47 y.o. year old male who presents to the office for followup of seizure-like activity.  He reports symptoms seem to occur about 3-1/2 weeks after experiences night sweats.  He reports excessive tearing in his eyes and arms shaking.  His symptoms have been present for about 5 years and are worsening.  He is been seen by neurology.  He also reports memory loss.  He reports symptoms are worsening.  The patient also reports he craves sugar intermittently.  He reports back pain has improved.    PAST MEDICAL HISTORY:  Past Medical History:   Diagnosis Date    Depression     Fibromyalgia     Marijuana use     Peptic ulcer     Scoliosis        SURGICAL HISTORY:  Past Surgical History:   Procedure Laterality Date    KNEE ARTHROSCOPY         MEDS:  Medcard reviewed and updated    ALLERGIES: Allergy Card reviewed and updated    SOCIAL HISTORY:   The patient is a nonsmoker.    PE:   APPEARANCE: Well nourished, well developed, in no acute distress.    EYES: Sclerae anicteric. PERRL. EOMI.      MOUTH & THROAT: No tonsillar enlargement. No pharyngeal erythema or exudate. No stridor.  CHEST: Lungs clear to auscultation with unlabored respirations.  CARDIOVASCULAR: Normal S1, S2. No murmurs. No carotid bruits. No pedal edema.  ABDOMEN: Bowel sounds normal. Not distended. Soft. No tenderness or masses.   NEUROLOGIC: Cranial Nerves: Intact.  PSYCHIATRIC: The patient is oriented to person, place, and time and has a pleasant affect.        ASSESSMENT/PLAN:  Luis Daniel was seen today for follow-up.    Diagnoses and all orders for this visit:    Low testosterone  -     Ambulatory referral/consult to Endocrinology; Future    Hyperhidrosis  -     Ambulatory referral/consult to Endocrinology; Future

## 2023-02-13 ENCOUNTER — OFFICE VISIT (OUTPATIENT)
Dept: ENDOCRINOLOGY | Facility: CLINIC | Age: 48
End: 2023-02-13
Payer: COMMERCIAL

## 2023-02-13 VITALS
DIASTOLIC BLOOD PRESSURE: 90 MMHG | SYSTOLIC BLOOD PRESSURE: 140 MMHG | WEIGHT: 172.63 LBS | BODY MASS INDEX: 24.42 KG/M2

## 2023-02-13 DIAGNOSIS — E29.1 MALE HYPOGONADISM: Primary | ICD-10-CM

## 2023-02-13 DIAGNOSIS — R61 HYPERHIDROSIS: ICD-10-CM

## 2023-02-13 DIAGNOSIS — R56.9 SEIZURE: ICD-10-CM

## 2023-02-13 DIAGNOSIS — R79.89 LOW TESTOSTERONE: ICD-10-CM

## 2023-02-13 PROCEDURE — 99204 PR OFFICE/OUTPT VISIT, NEW, LEVL IV, 45-59 MIN: ICD-10-PCS | Mod: S$GLB,,, | Performed by: INTERNAL MEDICINE

## 2023-02-13 PROCEDURE — 1160F PR REVIEW ALL MEDS BY PRESCRIBER/CLIN PHARMACIST DOCUMENTED: ICD-10-PCS | Mod: CPTII,S$GLB,, | Performed by: INTERNAL MEDICINE

## 2023-02-13 PROCEDURE — 3080F DIAST BP >= 90 MM HG: CPT | Mod: CPTII,S$GLB,, | Performed by: INTERNAL MEDICINE

## 2023-02-13 PROCEDURE — 3008F BODY MASS INDEX DOCD: CPT | Mod: CPTII,S$GLB,, | Performed by: INTERNAL MEDICINE

## 2023-02-13 PROCEDURE — 1159F MED LIST DOCD IN RCRD: CPT | Mod: CPTII,S$GLB,, | Performed by: INTERNAL MEDICINE

## 2023-02-13 PROCEDURE — 3080F PR MOST RECENT DIASTOLIC BLOOD PRESSURE >= 90 MM HG: ICD-10-PCS | Mod: CPTII,S$GLB,, | Performed by: INTERNAL MEDICINE

## 2023-02-13 PROCEDURE — 1160F RVW MEDS BY RX/DR IN RCRD: CPT | Mod: CPTII,S$GLB,, | Performed by: INTERNAL MEDICINE

## 2023-02-13 PROCEDURE — 3008F PR BODY MASS INDEX (BMI) DOCUMENTED: ICD-10-PCS | Mod: CPTII,S$GLB,, | Performed by: INTERNAL MEDICINE

## 2023-02-13 PROCEDURE — 99999 PR PBB SHADOW E&M-EST. PATIENT-LVL IV: ICD-10-PCS | Mod: PBBFAC,,, | Performed by: INTERNAL MEDICINE

## 2023-02-13 PROCEDURE — 99204 OFFICE O/P NEW MOD 45 MIN: CPT | Mod: S$GLB,,, | Performed by: INTERNAL MEDICINE

## 2023-02-13 PROCEDURE — 1159F PR MEDICATION LIST DOCUMENTED IN MEDICAL RECORD: ICD-10-PCS | Mod: CPTII,S$GLB,, | Performed by: INTERNAL MEDICINE

## 2023-02-13 PROCEDURE — 99999 PR PBB SHADOW E&M-EST. PATIENT-LVL IV: CPT | Mod: PBBFAC,,, | Performed by: INTERNAL MEDICINE

## 2023-02-13 PROCEDURE — 3077F PR MOST RECENT SYSTOLIC BLOOD PRESSURE >= 140 MM HG: ICD-10-PCS | Mod: CPTII,S$GLB,, | Performed by: INTERNAL MEDICINE

## 2023-02-13 PROCEDURE — 3077F SYST BP >= 140 MM HG: CPT | Mod: CPTII,S$GLB,, | Performed by: INTERNAL MEDICINE

## 2023-02-13 NOTE — ASSESSMENT & PLAN NOTE
--Patient presenting with episodes and a constellation of symptoms   --He has been diagnosed with seizures and told he likely has epilepsy, he is currently taking Vimpat, but reports still having the episodes and shaking despite this  --He does have documented hypogonadism that appears to be secondary  --I did let him know that the low testosterone would not explain most of the symptoms he has   --He does have intermittently low libido and decreased erections which is most specific for hypogonadism  --Will ensure the rest of his pituitary hormones are intact and check full ant pit panel  --Will also evaluate for other endocrine causes of spells which are less likely given symptoms such as pheo, carcinoid or hyperthyroidism  --Reviewed with him that we could restart testosterone after evaluation is complete if he would like
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
## 2023-02-14 ENCOUNTER — TELEPHONE (OUTPATIENT)
Dept: ENDOSCOPY | Facility: HOSPITAL | Age: 48
End: 2023-02-14
Payer: COMMERCIAL

## 2023-02-14 ENCOUNTER — PATIENT MESSAGE (OUTPATIENT)
Dept: INTERNAL MEDICINE | Facility: CLINIC | Age: 48
End: 2023-02-14
Payer: COMMERCIAL

## 2023-02-14 RX ORDER — POLYETHYLENE GLYCOL 3350, SODIUM SULFATE ANHYDROUS, SODIUM BICARBONATE, SODIUM CHLORIDE, POTASSIUM CHLORIDE 236; 22.74; 6.74; 5.86; 2.97 G/4L; G/4L; G/4L; G/4L; G/4L
4 POWDER, FOR SOLUTION ORAL ONCE
Qty: 4000 ML | Refills: 0 | Status: SHIPPED | OUTPATIENT
Start: 2023-02-14 | End: 2023-02-14

## 2023-02-14 NOTE — TELEPHONE ENCOUNTER
February 6, 2023  Cameron Parisi MD  to [facility]    12:26 PM   Yes, patient is cleared for colonoscopy.  Please ensure he takes his seizure medications as prescribed.            11:13 AM  You routed this conversation to Cameron Parisi MD    Me       11:12 AM  Note  Good Morning,     Patient has a referral for a colonoscopy. Is patient cleared from a neurology standpoint to proceed with colonoscopy? Please advise.      Thanks,     TOMAS Joiner

## 2023-02-14 NOTE — PLAN OF CARE
Endoscopy procedure scheduled on 3/15/23, prep instructions reviewed, Pt verbalized understanding.

## 2023-02-24 ENCOUNTER — PATIENT MESSAGE (OUTPATIENT)
Dept: ENDOCRINOLOGY | Facility: CLINIC | Age: 48
End: 2023-02-24
Payer: COMMERCIAL

## 2023-03-01 ENCOUNTER — TELEPHONE (OUTPATIENT)
Dept: NEUROLOGY | Facility: CLINIC | Age: 48
End: 2023-03-01
Payer: COMMERCIAL

## 2023-03-01 DIAGNOSIS — R56.9 SEIZURES: ICD-10-CM

## 2023-03-01 DIAGNOSIS — R56.9 SEIZURE-LIKE ACTIVITY: Primary | ICD-10-CM

## 2023-03-01 NOTE — TELEPHONE ENCOUNTER
EMU scheduled 3/27/23 at 11am. Aware he is required to have an adult accompany him for the duration including overnight. Aware he will be connected to lines to his head, chest, arm, and have an IV placed; will not be able to leave the room. Instructions thoroughly discussed, mailed via [employer]. Denies any special needs.

## 2023-03-08 ENCOUNTER — PATIENT MESSAGE (OUTPATIENT)
Dept: NEUROLOGY | Facility: CLINIC | Age: 48
End: 2023-03-08
Payer: COMMERCIAL

## 2023-03-08 ENCOUNTER — PATIENT MESSAGE (OUTPATIENT)
Dept: INTERNAL MEDICINE | Facility: CLINIC | Age: 48
End: 2023-03-08
Payer: COMMERCIAL

## 2023-03-09 ENCOUNTER — TELEPHONE (OUTPATIENT)
Dept: NEUROLOGY | Facility: CLINIC | Age: 48
End: 2023-03-09
Payer: COMMERCIAL

## 2023-03-09 DIAGNOSIS — R56.9 SEIZURES: Primary | ICD-10-CM

## 2023-03-09 NOTE — TELEPHONE ENCOUNTER
EMU rescheduled from 3/27/23 to 4/10/23 per vanessa request, his wife will be on Spring Break at this time and will be easier for her to come with time off. NIKA Dias, pre service rep notified of this schedule change

## 2023-03-14 ENCOUNTER — TELEPHONE (OUTPATIENT)
Dept: GASTROENTEROLOGY | Facility: CLINIC | Age: 48
End: 2023-03-14

## 2023-03-14 ENCOUNTER — OFFICE VISIT (OUTPATIENT)
Dept: GASTROENTEROLOGY | Facility: CLINIC | Age: 48
End: 2023-03-14
Payer: COMMERCIAL

## 2023-03-14 ENCOUNTER — TELEPHONE (OUTPATIENT)
Dept: NEUROLOGY | Facility: CLINIC | Age: 48
End: 2023-03-14
Payer: COMMERCIAL

## 2023-03-14 ENCOUNTER — PATIENT MESSAGE (OUTPATIENT)
Dept: INTERNAL MEDICINE | Facility: CLINIC | Age: 48
End: 2023-03-14
Payer: COMMERCIAL

## 2023-03-14 ENCOUNTER — OFFICE VISIT (OUTPATIENT)
Dept: NEUROLOGY | Facility: CLINIC | Age: 48
End: 2023-03-14
Payer: COMMERCIAL

## 2023-03-14 DIAGNOSIS — R41.89 BRAIN FOG: ICD-10-CM

## 2023-03-14 DIAGNOSIS — R19.7 DIARRHEA, UNSPECIFIED TYPE: Primary | ICD-10-CM

## 2023-03-14 DIAGNOSIS — E53.8 B12 DEFICIENCY: ICD-10-CM

## 2023-03-14 DIAGNOSIS — E53.8 B12 DEFICIENCY DUE TO DIET: ICD-10-CM

## 2023-03-14 DIAGNOSIS — R94.01 ABNORMAL EEG: ICD-10-CM

## 2023-03-14 DIAGNOSIS — K90.9 INTESTINAL MALABSORPTION, UNSPECIFIED TYPE: ICD-10-CM

## 2023-03-14 DIAGNOSIS — R41.3 MEMORY LOSS: ICD-10-CM

## 2023-03-14 DIAGNOSIS — R56.9 SEIZURE-LIKE ACTIVITY: Primary | ICD-10-CM

## 2023-03-14 DIAGNOSIS — R79.89 LOW TESTOSTERONE: ICD-10-CM

## 2023-03-14 PROCEDURE — 99214 PR OFFICE/OUTPT VISIT, EST, LEVL IV, 30-39 MIN: ICD-10-PCS | Mod: 95,,, | Performed by: PSYCHIATRY & NEUROLOGY

## 2023-03-14 PROCEDURE — 99214 OFFICE O/P EST MOD 30 MIN: CPT | Mod: 95,,, | Performed by: PSYCHIATRY & NEUROLOGY

## 2023-03-14 PROCEDURE — 99204 PR OFFICE/OUTPT VISIT, NEW, LEVL IV, 45-59 MIN: ICD-10-PCS | Mod: 95,,, | Performed by: INTERNAL MEDICINE

## 2023-03-14 PROCEDURE — 1159F PR MEDICATION LIST DOCUMENTED IN MEDICAL RECORD: ICD-10-PCS | Mod: CPTII,95,, | Performed by: PSYCHIATRY & NEUROLOGY

## 2023-03-14 PROCEDURE — 1160F PR REVIEW ALL MEDS BY PRESCRIBER/CLIN PHARMACIST DOCUMENTED: ICD-10-PCS | Mod: CPTII,95,, | Performed by: PSYCHIATRY & NEUROLOGY

## 2023-03-14 PROCEDURE — 1160F RVW MEDS BY RX/DR IN RCRD: CPT | Mod: CPTII,95,, | Performed by: PSYCHIATRY & NEUROLOGY

## 2023-03-14 PROCEDURE — 99204 OFFICE O/P NEW MOD 45 MIN: CPT | Mod: 95,,, | Performed by: INTERNAL MEDICINE

## 2023-03-14 PROCEDURE — 1159F MED LIST DOCD IN RCRD: CPT | Mod: CPTII,95,, | Performed by: PSYCHIATRY & NEUROLOGY
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
## 2023-03-14 NOTE — TELEPHONE ENCOUNTER
----- Message from Angelo Salgado MD sent at 3/14/2023 12:02 PM CDT -----  Please contact him, needs stool studies and full labs, and needs to collect 24 hour urine samples as well  Put him in for follow up 6 weeks...

## 2023-03-14 NOTE — Clinical Note
Please contact him, needs stool studies and full labs, and needs to collect 24 hour urine samples as well Put him in for follow up 6 weeks...
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
## 2023-03-14 NOTE — TELEPHONE ENCOUNTER
Patient has a colonoscopy scheduled tomorrow. He will get a stool kit, Urine sample jug and labs done. F/U Appt scheduled on 4/27/23.

## 2023-03-20 ENCOUNTER — TELEPHONE (OUTPATIENT)
Dept: NEUROLOGY | Facility: CLINIC | Age: 48
End: 2023-03-20
Payer: COMMERCIAL

## 2023-03-20 DIAGNOSIS — Z12.11 SCREEN FOR COLON CANCER: Primary | ICD-10-CM

## 2023-03-20 RX ORDER — SOD SULF/POT CHLORIDE/MAG SULF 1.479 G
12 TABLET ORAL DAILY
Qty: 24 TABLET | Refills: 0 | Status: ON HOLD | OUTPATIENT
Start: 2023-03-20 | End: 2023-04-05 | Stop reason: CLARIF

## 2023-03-20 NOTE — TELEPHONE ENCOUNTER
Spoke with patient and he would like to have his colonoscopy done at the [facility] with Dr. Salgado. Sutab instructions reviewed with patient and sent via portal. Patient will check with pharmacy to see if they are covered. Colonoscopy scheduled on 4/5/23.

## 2023-03-20 NOTE — TELEPHONE ENCOUNTER
----- Message from Shyla Barba sent at 3/20/2023  2:34 PM CDT -----  .Type:  Request to Schedule Procedure    Who Called: pt  Would the patient rather a call back or a response via [facility]? [facility]   Best Call Back Number: [phone]  Additional Information:     Pt had a virtual appt 3/14 and was supposed to have the colonoscopy with another provider 3/15 but canceled because he would prefer to schedule procedure with Dr. Salgado

## 2023-03-22 NOTE — TELEPHONE ENCOUNTER
EMU Admission approved for 5 days, P2P completed today, 3/22/23, with Dr. FAIRBANKS and reviewer, Dr. Sony Amezcua. Ref # [id number]

## 2023-03-27 ENCOUNTER — TELEPHONE (OUTPATIENT)
Dept: NEUROLOGY | Facility: CLINIC | Age: 48
End: 2023-03-27
Payer: COMMERCIAL

## 2023-03-31 ENCOUNTER — TELEPHONE (OUTPATIENT)
Dept: GASTROENTEROLOGY | Facility: CLINIC | Age: 48
End: 2023-03-31
Payer: COMMERCIAL

## 2023-03-31 NOTE — TELEPHONE ENCOUNTER
----- Message from Mali Ventura sent at 3/31/2023 12:16 PM CDT -----  Type:  Needs Medical Advice    Who Called: pt  Symptoms (please be specific): pt is informing that he had canceled his Endo with Dr Mcgee and is wondering if Dr Salgado is wanting to do and Endo during the procedure please call back to discuss   Would the patient rather a call back or a response via [facility]? call  Best Call Back Number: [phone]  Additional Information:

## 2023-03-31 NOTE — TELEPHONE ENCOUNTER
Patient canceled the EGD with Dr. Mcgee. He would like to know if you would like to add an EGD to the Colonoscopy that is scheduled on 4/5/23.

## 2023-04-02 ENCOUNTER — PATIENT MESSAGE (OUTPATIENT)
Dept: NEUROLOGY | Facility: CLINIC | Age: 48
End: 2023-04-02
Payer: COMMERCIAL

## 2023-04-03 ENCOUNTER — TELEPHONE (OUTPATIENT)
Dept: ENDOSCOPY | Facility: HOSPITAL | Age: 48
End: 2023-04-03
Payer: COMMERCIAL

## 2023-04-03 ENCOUNTER — TELEPHONE (OUTPATIENT)
Dept: NEUROLOGY | Facility: CLINIC | Age: 48
End: 2023-04-03
Payer: COMMERCIAL

## 2023-04-03 DIAGNOSIS — R56.9 SEIZURES: ICD-10-CM

## 2023-04-03 DIAGNOSIS — R56.9 SEIZURE-LIKE ACTIVITY: Primary | ICD-10-CM

## 2023-04-03 NOTE — TELEPHONE ENCOUNTER
Spoke with patient- his wife has had an emergent surgery needing recovery for the next few weeks. EMU admission rescheduled to 6/5/23, 11am.

## 2023-04-03 NOTE — TELEPHONE ENCOUNTER
Spoke with patient about arrival time @. 1130  Covid test =     Prep instructions reviewed: the day before the procedure, follow a clear liquid diet all day, then start the first 1/2 of prep at 5pm and take 2nd 1/2 of prep @. 0530 am Pt must be completely NPO when prep completed @.    0730 am          Medications: Do not take Insulin or oral diabetic medications the day of the procedure.  Take as prescribed: heart, seizure and blood pressure medication in the morning with a sip of water (less than an ounce).  Take any breathing medications and bring inhalers to hospital with you Leave all valuables and jewelry at home.     Wear comfortable clothes to procedure to change into hospital gown You cannot drive for 24 hours after your procedure because you will receive sedation for your procedure to make you comfortable.  A ride must be provided at discharge.

## 2023-04-05 ENCOUNTER — ANESTHESIA EVENT (OUTPATIENT)
Dept: ENDOSCOPY | Facility: HOSPITAL | Age: 48
End: 2023-04-05
Payer: COMMERCIAL

## 2023-04-05 ENCOUNTER — ANESTHESIA (OUTPATIENT)
Dept: ENDOSCOPY | Facility: HOSPITAL | Age: 48
End: 2023-04-05
Payer: COMMERCIAL

## 2023-04-05 ENCOUNTER — HOSPITAL ENCOUNTER (OUTPATIENT)
Facility: HOSPITAL | Age: 48
Discharge: HOME | End: 2023-04-05
Attending: INTERNAL MEDICINE | Admitting: INTERNAL MEDICINE
Payer: COMMERCIAL

## 2023-04-05 VITALS
RESPIRATION RATE: 17 BRPM | WEIGHT: 168 LBS | DIASTOLIC BLOOD PRESSURE: 62 MMHG | HEART RATE: 60 BPM | HEIGHT: 70 IN | SYSTOLIC BLOOD PRESSURE: 124 MMHG | BODY MASS INDEX: 24.05 KG/M2 | OXYGEN SATURATION: 100 % | TEMPERATURE: 98 F

## 2023-04-05 DIAGNOSIS — Z12.11 SCREEN FOR COLON CANCER: ICD-10-CM

## 2023-04-05 PROCEDURE — 88305 TISSUE EXAM BY PATHOLOGIST: CPT | Mod: 26,,, | Performed by: PATHOLOGY

## 2023-04-05 PROCEDURE — 27201089 HC SNARE, DISP (ANY): Performed by: INTERNAL MEDICINE

## 2023-04-05 PROCEDURE — 43239 PR EGD, FLEX, W/BIOPSY, SGL/MULTI: ICD-10-PCS | Mod: 51,,, | Performed by: INTERNAL MEDICINE

## 2023-04-05 PROCEDURE — 82657 ENZYME CELL ACTIVITY: CPT | Performed by: PATHOLOGY

## 2023-04-05 PROCEDURE — 45380 COLONOSCOPY AND BIOPSY: CPT | Mod: 33,59,, | Performed by: INTERNAL MEDICINE

## 2023-04-05 PROCEDURE — D9220A PRA ANESTHESIA: Mod: 33,CRNA,, | Performed by: NURSE ANESTHETIST, CERTIFIED REGISTERED

## 2023-04-05 PROCEDURE — D9220A PRA ANESTHESIA: Mod: 33,ANES,, | Performed by: ANESTHESIOLOGY

## 2023-04-05 PROCEDURE — D9220A PRA ANESTHESIA: ICD-10-PCS | Mod: 33,ANES,, | Performed by: ANESTHESIOLOGY

## 2023-04-05 PROCEDURE — 45380 COLONOSCOPY AND BIOPSY: CPT | Mod: PT,59 | Performed by: INTERNAL MEDICINE

## 2023-04-05 PROCEDURE — 45385 PR COLONOSCOPY,REMV LESN,SNARE: ICD-10-PCS | Mod: 33,,, | Performed by: INTERNAL MEDICINE

## 2023-04-05 PROCEDURE — 25000003 PHARM REV CODE 250: Performed by: NURSE ANESTHETIST, CERTIFIED REGISTERED

## 2023-04-05 PROCEDURE — D9220A PRA ANESTHESIA: ICD-10-PCS | Mod: 33,CRNA,, | Performed by: NURSE ANESTHETIST, CERTIFIED REGISTERED

## 2023-04-05 PROCEDURE — 43239 EGD BIOPSY SINGLE/MULTIPLE: CPT | Mod: 51,,, | Performed by: INTERNAL MEDICINE

## 2023-04-05 PROCEDURE — 45385 COLONOSCOPY W/LESION REMOVAL: CPT | Mod: 33,,, | Performed by: INTERNAL MEDICINE

## 2023-04-05 PROCEDURE — 45385 COLONOSCOPY W/LESION REMOVAL: CPT | Mod: PT | Performed by: INTERNAL MEDICINE

## 2023-04-05 PROCEDURE — 25000003 PHARM REV CODE 250: Performed by: INTERNAL MEDICINE

## 2023-04-05 PROCEDURE — 88305 TISSUE EXAM BY PATHOLOGIST: CPT | Mod: 59 | Performed by: PATHOLOGY

## 2023-04-05 PROCEDURE — 88305 TISSUE EXAM BY PATHOLOGIST: ICD-10-PCS | Mod: 26,,, | Performed by: PATHOLOGY

## 2023-04-05 PROCEDURE — 27201012 HC FORCEPS, HOT/COLD, DISP: Performed by: INTERNAL MEDICINE

## 2023-04-05 PROCEDURE — 37000009 HC ANESTHESIA EA ADD 15 MINS: Performed by: INTERNAL MEDICINE

## 2023-04-05 PROCEDURE — 37000008 HC ANESTHESIA 1ST 15 MINUTES: Performed by: INTERNAL MEDICINE

## 2023-04-05 PROCEDURE — 43239 EGD BIOPSY SINGLE/MULTIPLE: CPT | Performed by: INTERNAL MEDICINE

## 2023-04-05 PROCEDURE — 63600175 PHARM REV CODE 636 W HCPCS: Performed by: NURSE ANESTHETIST, CERTIFIED REGISTERED

## 2023-04-05 PROCEDURE — 45380 PR COLONOSCOPY,BIOPSY: ICD-10-PCS | Mod: 33,59,, | Performed by: INTERNAL MEDICINE

## 2023-04-05 RX ORDER — SODIUM CHLORIDE 9 MG/ML
INJECTION, SOLUTION INTRAVENOUS CONTINUOUS
Status: DISCONTINUED | OUTPATIENT
Start: 2023-04-05 | End: 2023-04-05 | Stop reason: HOSPADM

## 2023-04-05 RX ORDER — PROPOFOL 10 MG/ML
VIAL (ML) INTRAVENOUS CONTINUOUS PRN
Status: DISCONTINUED | OUTPATIENT
Start: 2023-04-05 | End: 2023-04-05

## 2023-04-05 RX ORDER — SODIUM CHLORIDE 0.9 % (FLUSH) 0.9 %
10 SYRINGE (ML) INJECTION
Status: DISCONTINUED | OUTPATIENT
Start: 2023-04-05 | End: 2023-04-05 | Stop reason: HOSPADM

## 2023-04-05 RX ORDER — LIDOCAINE HYDROCHLORIDE 20 MG/ML
INJECTION INTRAVENOUS
Status: DISCONTINUED | OUTPATIENT
Start: 2023-04-05 | End: 2023-04-05

## 2023-04-05 RX ORDER — PROPOFOL 10 MG/ML
VIAL (ML) INTRAVENOUS
Status: DISCONTINUED | OUTPATIENT
Start: 2023-04-05 | End: 2023-04-05

## 2023-04-05 RX ADMIN — PROPOFOL 80 MG: 10 INJECTION, EMULSION INTRAVENOUS at 12:04

## 2023-04-05 RX ADMIN — PROPOFOL 150 MCG/KG/MIN: 10 INJECTION, EMULSION INTRAVENOUS at 12:04

## 2023-04-05 RX ADMIN — SODIUM CHLORIDE: 0.9 INJECTION, SOLUTION INTRAVENOUS at 12:04

## 2023-04-05 RX ADMIN — LIDOCAINE HYDROCHLORIDE 50 MG: 20 INJECTION, SOLUTION INTRAVENOUS at 12:04

## 2023-04-05 NOTE — [employer] PATIENT INSTRUCTIONS
Discharge Summary/Instructions after an Endoscopic Procedure  Patient Name: Luis Daniel Wynne  Patient MRN: [id number]  Patient YOB: 1975 Wednesday, April 5, 2023  Angelo Salgado MD  Dear patient,  As a result of recent federal legislation (The Federal Cures Act), you may   receive lab or pathology results from your procedure in your [facility]   account before your physician is able to contact you. Your physician or   their representative will relay the results to you with their   recommendations at their soonest availability.  Thank you,  Your health is very important to us during the Covid Crisis. Following your   procedure today, you will receive a daily text for 2 weeks asking about   signs or symptoms of Covid 19.  Please respond to this text when you   receive it so we can follow up and keep you as safe as possible.   RESTRICTIONS:  During your procedure today, you received medications for sedation.  These   medications may affect your judgment, balance and coordination.  Therefore,   for 24 hours, you have the following restrictions:   - DO NOT drive a car, operate machinery, make legal/financial decisions,   sign important papers or drink alcohol.    ACTIVITY:  Today: no heavy lifting, straining or running due to procedural   sedation/anesthesia.  The following day: return to full activity including work.  DIET:  Eat and drink normally unless instructed otherwise.     TREATMENT FOR COMMON SIDE EFFECTS:  - Mild abdominal pain, nausea, belching, bloating or excessive gas:  rest,   eat lightly and use a heating pad.  - Sore Throat: treat with throat lozenges and/or gargle with warm salt   water.  - Because air was used during the procedure, expelling large amounts of air   from your rectum or belching is normal.  - If a bowel prep was taken, you may not have a bowel movement for 1-3 days.    This is normal.  SYMPTOMS TO WATCH FOR AND REPORT TO YOUR PHYSICIAN:  1. Abdominal pain or bloating, other than  gas cramps.  2. Chest pain.  3. Back pain.  4. Signs of infection such as: chills or fever occurring within 24 hours   after the procedure.  5. Rectal bleeding, which would show as bright red, maroon, or black stools.   (A tablespoon of blood from the rectum is not serious, especially if   hemorrhoids are present.)  6. Vomiting.  7. Weakness or dizziness.  GO DIRECTLY TO THE NEAREST EMERGENCY ROOM IF YOU HAVE ANY OF THE FOLLOWING:      Difficulty breathing              Chills and/or fever over 101 F   Persistent vomiting and/or vomiting blood   Severe abdominal pain   Severe chest pain   Black, tarry stools   Bleeding- more than one tablespoon   Any other symptom or condition that you feel may need urgent attention  Your doctor recommends these additional instructions:  If any biopsies were taken, your doctors clinic will contact you in 1 to 2   weeks with any results.  - Discharge patient to home.   - Patient has a contact number available for emergencies.  The signs and   symptoms of potential delayed complications were discussed with the   patient.  Return to normal activities tomorrow.  Written discharge   instructions were provided to the patient.   - Resume previous diet.   - Continue present medications.   - Await pathology results.   - Repeat colonoscopy in 5 years for surveillance.  For questions, problems or results please call your physician - Angelo Salgado MD.  EMERGENCY PHONE NUMBER: [phone],  LAB RESULTS: [phone]  IF A COMPLICATION OR EMERGENCY SITUATION ARISES AND YOU ARE UNABLE TO REACH   YOUR PHYSICIAN - GO DIRECTLY TO THE EMERGENCY ROOM.  Angelo Salgado MD  4/5/2023 1:13:37 PM  This report has been verified and signed electronically.  Dear patient,  As a result of recent federal legislation (The Federal Cures Act), you may   receive lab or pathology results from your procedure in your [facility]   account before your physician is able to contact you. Your physician or   their  representative will relay the results to you with their   recommendations at their soonest availability.  Thank you,  PROVATION

## 2023-04-05 NOTE — PROVATION PATIENT INSTRUCTIONS
Discharge Summary/Instructions after an Endoscopic Procedure  Patient Name: Luis Daniel Wynne  Patient MRN: [id number]  Patient YOB: 1975 Wednesday, April 5, 2023  Angelo Salgado MD  Dear patient,  As a result of recent federal legislation (The Federal Cures Act), you may   receive lab or pathology results from your procedure in your [facility]   account before your physician is able to contact you. Your physician or   their representative will relay the results to you with their   recommendations at their soonest availability.  Thank you,  Your health is very important to us during the Covid Crisis. Following your   procedure today, you will receive a daily text for 2 weeks asking about   signs or symptoms of Covid 19.  Please respond to this text when you   receive it so we can follow up and keep you as safe as possible.   RESTRICTIONS:  During your procedure today, you received medications for sedation.  These   medications may affect your judgment, balance and coordination.  Therefore,   for 24 hours, you have the following restrictions:   - DO NOT drive a car, operate machinery, make legal/financial decisions,   sign important papers or drink alcohol.    ACTIVITY:  Today: no heavy lifting, straining or running due to procedural   sedation/anesthesia.  The following day: return to full activity including work.  DIET:  Eat and drink normally unless instructed otherwise.     TREATMENT FOR COMMON SIDE EFFECTS:  - Mild abdominal pain, nausea, belching, bloating or excessive gas:  rest,   eat lightly and use a heating pad.  - Sore Throat: treat with throat lozenges and/or gargle with warm salt   water.  - Because air was used during the procedure, expelling large amounts of air   from your rectum or belching is normal.  - If a bowel prep was taken, you may not have a bowel movement for 1-3 days.    This is normal.  SYMPTOMS TO WATCH FOR AND REPORT TO YOUR PHYSICIAN:  1. Abdominal pain or bloating, other than  gas cramps.  2. Chest pain.  3. Back pain.  4. Signs of infection such as: chills or fever occurring within 24 hours   after the procedure.  5. Rectal bleeding, which would show as bright red, maroon, or black stools.   (A tablespoon of blood from the rectum is not serious, especially if   hemorrhoids are present.)  6. Vomiting.  7. Weakness or dizziness.  GO DIRECTLY TO THE NEAREST EMERGENCY ROOM IF YOU HAVE ANY OF THE FOLLOWING:      Difficulty breathing              Chills and/or fever over 101 F   Persistent vomiting and/or vomiting blood   Severe abdominal pain   Severe chest pain   Black, tarry stools   Bleeding- more than one tablespoon   Any other symptom or condition that you feel may need urgent attention  Your doctor recommends these additional instructions:  If any biopsies were taken, your doctors clinic will contact you in 1 to 2   weeks with any results.  - Discharge patient to home.   - Patient has a contact number available for emergencies.  The signs and   symptoms of potential delayed complications were discussed with the   patient.  Return to normal activities tomorrow.  Written discharge   instructions were provided to the patient.   - Resume previous diet.   - Continue present medications.   - Await pathology results.   - Perform a colonoscopy today.  For questions, problems or results please call your physician - Angelo Salgado MD.  EMERGENCY PHONE NUMBER: [phone],  LAB RESULTS: [phone]  IF A COMPLICATION OR EMERGENCY SITUATION ARISES AND YOU ARE UNABLE TO REACH   YOUR PHYSICIAN - GO DIRECTLY TO THE EMERGENCY ROOM.  Angelo Salgado MD  4/5/2023 12:54:01 PM  This report has been verified and signed electronically.  Dear patient,  As a result of recent federal legislation (The Federal Cures Act), you may   receive lab or pathology results from your procedure in your [facility]   account before your physician is able to contact you. Your physician or   their representative will  relay the results to you with their   recommendations at their soonest availability.  Thank you,  [employer]OVATION

## 2023-04-05 NOTE — ANESTHESIA PREPROCEDURE EVALUATION
04/05/2023  Luis Daniel Wynne Jr. is a 47 y.o., male.      Pre-op Assessment    I have reviewed the Patient Summary Reports.     I have reviewed the Nursing Notes. I have reviewed the NPO Status.   I have reviewed the Medications.     Review of Systems  Anesthesia Hx:  No problems with previous Anesthesia  Denies Family Hx of Anesthesia complications.   Denies Personal Hx of Anesthesia complications.   Social:  Former Smoker, No Alcohol Use THC use   Smokeless tobacco    Cardiovascular:   Exercise tolerance: good Denies Hypertension.  Denies Valvular problems/Murmurs.  Denies MI.  Denies CAD.    Denies Dysrhythmias.   Denies Angina.    Pulmonary:   Denies COPD.  Denies Asthma.  Denies Shortness of breath.  Denies Recent URI.  Denies Sleep Apnea.    Renal/:   Denies Chronic Renal Disease. BPH    Hepatic/GI:   PUD, (hx of ulcers, no current issue ) Denies GERD. Denies Liver Disease.    Musculoskeletal:   Arthritis     Neurological:   Denies TIA. Denies CVA. Neuromuscular Disease,  Seizures, well controlled    Endocrine:   Denies Diabetes. Denies Hypothyroidism.    Psych:   Psychiatric History (ADHD ) anxiety          Physical Exam  General: Well nourished, Cooperative, Alert and Oriented    Airway:  Mallampati: II / II  Mouth Opening: Normal  TM Distance: Normal  Tongue: Normal  Neck ROM: Normal ROM    Dental:  Intact    Chest/Lungs:  Clear to auscultation, Normal Respiratory Rate    Heart:  Rate: Normal  Rhythm: Regular Rhythm  Sounds: Normal      @lastpt@    BMP  Lab Results   Component Value Date     04/03/2023    K 4.0 04/03/2023     04/03/2023    CO2 26 04/03/2023    BUN 12 04/03/2023    CREATININE 0.82 04/03/2023    CALCIUM 9.0 04/03/2023    ANIONGAP 8 04/03/2023    EGFRNORACEVR >60.0 04/03/2023       CMP  Sodium   Date Value Ref Range Status   04/03/2023 140 136 - 145 mmol/L Final      Potassium   Date Value Ref Range Status   04/03/2023 4.0 3.5 - 5.1 mmol/L Final     Chloride   Date Value Ref Range Status   04/03/2023 106 95 - 110 mmol/L Final     CO2   Date Value Ref Range Status   04/03/2023 26 23 - 29 mmol/L Final     Glucose   Date Value Ref Range Status   04/03/2023 95 70 - 110 mg/dL Final     BUN   Date Value Ref Range Status   04/03/2023 12 2 - 20 mg/dL Final     Creatinine   Date Value Ref Range Status   04/03/2023 0.82 0.50 - 1.40 mg/dL Final     Calcium   Date Value Ref Range Status   04/03/2023 9.0 8.7 - 10.5 mg/dL Final     Total Protein   Date Value Ref Range Status   04/03/2023 7.7 6.0 - 8.4 g/dL Final     Albumin   Date Value Ref Range Status   04/03/2023 4.5 3.5 - 5.2 g/dL Final   02/15/2023 4.2 3.6 - 5.1 g/dL Final     Total Bilirubin   Date Value Ref Range Status   04/03/2023 0.7 0.1 - 1.0 mg/dL Final     Comment:     For infants and newborns, interpretation of results should be based  on gestational age, weight and in agreement with clinical  observations.    Premature Infant recommended reference ranges:  Up to 24 hours.............<8.0 mg/dL  Up to 48 hours............<12.0 mg/dL  3-5 days..................<15.0 mg/dL  6-29 days.................<15.0 mg/dL       Alkaline Phosphatase   Date Value Ref Range Status   04/03/2023 75 38 - 126 U/L Final     AST   Date Value Ref Range Status   04/03/2023 24 15 - 46 U/L Final     ALT   Date Value Ref Range Status   04/03/2023 25 10 - 44 U/L Final     Anion Gap   Date Value Ref Range Status   04/03/2023 8 8 - 16 mmol/L Final     eGFR   Date Value Ref Range Status   04/03/2023 >60.0 >60 mL/min/1.73 m^2 Final       Lab Results   Component Value Date    WBC 8.54 04/03/2023    HGB 15.4 04/03/2023    HCT 44.9 04/03/2023    MCV 91 04/03/2023     04/03/2023               Anesthesia Plan  Type of Anesthesia, risks & benefits discussed:    Anesthesia Type: Gen Natural Airway  Intra-op Monitoring Plan: Standard ASA Monitors  Post Op  Pain Control Plan: multimodal analgesia  Induction:  IV  Airway Plan: Direct and Video  Informed Consent: Informed consent signed with the Patient and all parties understand the risks and agree with anesthesia plan.  All questions answered.   ASA Score: 3  Day of Surgery Review of History & Physical: H&P Update referred to the surgeon/provider.    Ready For Surgery From Anesthesia Perspective.     .

## 2023-04-05 NOTE — TRANSFER OF CARE
"Anesthesia Transfer of Care Note    Patient: Luis Daniel Wynne Jr.    Procedure(s) Performed: Procedure(s) (LRB):  COLONOSCOPY sutab (N/A)  EGD (ESOPHAGOGASTRODUODENOSCOPY) (N/A)    Patient location: GI    Anesthesia Type: general    Transport from OR: Transported from OR on room air with adequate spontaneous ventilation    Post pain: adequate analgesia    Post assessment: no apparent anesthetic complications and tolerated procedure well    Post vital signs: stable    Level of consciousness: awake and responds to stimulation    Nausea/Vomiting: no nausea/vomiting    Complications: none    Transfer of care protocol was followed      Last vitals:   Visit Vitals  BP (!) 155/84 (BP Location: Left arm, Patient Position: Lying)   Pulse 69   Temp 36.7 °C (98.1 °F) (Temporal)   Resp 18   Ht 5' 10" (1.778 m)   Wt 76.2 kg (168 lb)   SpO2 97%   BMI 24.11 kg/m²     "

## 2023-04-05 NOTE — ANESTHESIA POSTPROCEDURE EVALUATION
Anesthesia Post Evaluation    Patient: Luis Daniel Wynne Jr.    Procedure(s) Performed: Procedure(s) (LRB):  COLONOSCOPY sutab (N/A)  EGD (ESOPHAGOGASTRODUODENOSCOPY) (N/A)    Final Anesthesia Type: general      Patient location during evaluation: GI PACU  Patient participation: Yes- Able to Participate  Level of consciousness: awake and alert and oriented  Post-procedure vital signs: reviewed and stable  Pain management: adequate  Airway patency: patent    PONV status at discharge: No PONV  Anesthetic complications: no      Cardiovascular status: blood pressure returned to baseline  Respiratory status: unassisted  Hydration status: euvolemic  Follow-up not needed.          Vitals Value Taken Time   /62 04/05/23 1337   Temp 36.3   04/05/23 1343   Pulse 60 04/05/23 1337   Resp 17 04/05/23 1337   SpO2 100 % 04/05/23 1337         No case tracking events are documented in the log.      Pain/Parisa Score: Parisa Score: 10 (4/5/2023  1:38 PM)

## 2023-04-10 ENCOUNTER — PATIENT MESSAGE (OUTPATIENT)
Dept: NEUROLOGY | Facility: CLINIC | Age: 48
End: 2023-04-10
Payer: COMMERCIAL

## 2023-04-10 ENCOUNTER — PATIENT MESSAGE (OUTPATIENT)
Dept: INTERNAL MEDICINE | Facility: CLINIC | Age: 48
End: 2023-04-10
Payer: COMMERCIAL

## 2023-04-11 ENCOUNTER — PATIENT MESSAGE (OUTPATIENT)
Dept: INTERNAL MEDICINE | Facility: CLINIC | Age: 48
End: 2023-04-11
Payer: COMMERCIAL

## 2023-04-11 LAB
FINAL PATHOLOGIC DIAGNOSIS: NORMAL
GROSS: NORMAL
Lab: NORMAL

## 2023-04-11 NOTE — TELEPHONE ENCOUNTER
Please contact patient and see if he would be available to come in for an appointment during lunch tomorrow.  Please double book.

## 2023-04-12 ENCOUNTER — PATIENT MESSAGE (OUTPATIENT)
Dept: NEUROLOGY | Facility: CLINIC | Age: 48
End: 2023-04-12
Payer: COMMERCIAL

## 2023-04-12 ENCOUNTER — OFFICE VISIT (OUTPATIENT)
Dept: INTERNAL MEDICINE | Facility: CLINIC | Age: 48
End: 2023-04-12
Payer: COMMERCIAL

## 2023-04-12 DIAGNOSIS — R19.7 DIARRHEA, UNSPECIFIED TYPE: Primary | ICD-10-CM

## 2023-04-12 PROCEDURE — 99213 OFFICE O/P EST LOW 20 MIN: CPT | Mod: 95,,, | Performed by: INTERNAL MEDICINE

## 2023-04-12 PROCEDURE — 99213 PR OFFICE/OUTPT VISIT, EST, LEVL III, 20-29 MIN: ICD-10-PCS | Mod: 95,,, | Performed by: INTERNAL MEDICINE

## 2023-04-12 NOTE — PROGRESS NOTES
The patient location is: [place]  The chief complaint leading to consultation is: diarrhea    Visit type: audiovisual    Face to Face time with patient: 16   minutes of total time spent on the encounter, which includes face to face time and non-face to face time preparing to see the patient (eg, review of tests), Obtaining and/or reviewing separately obtained history, Documenting clinical information in the electronic or other health record, Independently interpreting results (not separately reported) and communicating results to the patient/family/caregiver, or Care coordination (not separately reported).         Each patient to whom he or she provides medical services by telemedicine is:  (1) informed of the relationship between the physician and patient and the respective role of any other health care provider with respect to management of the patient; and (2) notified that he or she may decline to receive medical services by telemedicine and may withdraw from such care at any time.    Notes:   CC: diarrhea  HPI:  The patient is a 47 y.o. year old male who presents to the office for diarrhea.  He reports his symptoms are on the lunar cycle.  He reports painful lumps in his neck that occurs in areas where he sweats.  Reports his testosterone levels have been labile, and his symptoms are worsening.  The patient reports symptoms occur every 4 weeks, and seemed to worsen during warmer weather.  He also reports blurred vision in the evening, alteration in his sense of smell, which can last all day and decreased appetite.  He states he eats once to twice a week.  He also reports he feels full constantly.  He reports on the night of the full moon recently, he experience worsening blood pressure.  He is currently on 2 seizure medications, which he states are not effective.  His wife reports he has episodes where he appears to be convulsing while he is alert and walking.  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  He reports after an episode, he goes down for about 4 days.  His symptoms resolved spontaneously.  He reports persistent diarrhea, but denies any nausea or vomiting.      PAST MEDICAL HISTORY:  Past Medical History:   Diagnosis Date    Depression     Fibromyalgia     Marijuana use     Peptic ulcer     Scoliosis        SURGICAL HISTORY:  Past Surgical History:   Procedure Laterality Date    COLONOSCOPY N/A 4/5/2023    Procedure: COLONOSCOPY sutab;  Surgeon: Angelo Salgado MD;  Location: [facility];  Service: Endoscopy;  Laterality: N/A;    ESOPHAGOGASTRODUODENOSCOPY N/A 4/5/2023    Procedure: EGD (ESOPHAGOGASTRODUODENOSCOPY);  Surgeon: Angelo Salgado MD;  Location: [facility];  Service: Endoscopy;  Laterality: N/A;    KNEE ARTHROSCOPY         MEDS:  Medcard reviewed and updated    ALLERGIES: Allergy Card reviewed and updated    SOCIAL HISTORY:   The patient is a nonsmoker.    PE:   APPEARANCE: Well nourished, well developed, in no acute distress.    Video visit  PSYCHIATRIC: The patient is oriented to person, place, and time and has a pleasant affect.        ASSESSMENT/PLAN:  Diagnoses and all orders for this visit:    Diarrhea, unspecified type  -     Ambulatory referral/consult to Infectious Disease; Future  -     Stool Exam-Ova,Cysts,Parasites; Future

## 2023-04-12 NOTE — TELEPHONE ENCOUNTER
Called pt and informed  is running behind. I will call back once she is about to log in for the virtual

## 2023-04-13 NOTE — TELEPHONE ENCOUNTER
Pt states  referral needs a more descriptive reason besides diarrhea. Pt needs referrals to GI and Neuro. Include who I've already seen and they will get me in.

## 2023-04-14 ENCOUNTER — PATIENT MESSAGE (OUTPATIENT)
Dept: INTERNAL MEDICINE | Facility: CLINIC | Age: 48
End: 2023-04-14
Payer: COMMERCIAL

## 2023-04-17 ENCOUNTER — TELEPHONE (OUTPATIENT)
Dept: NEUROLOGY | Facility: CLINIC | Age: 48
End: 2023-04-17
Payer: COMMERCIAL

## 2023-04-17 NOTE — TELEPHONE ENCOUNTER
Called wife back - spoke to her as well as patient.  He is having almost daily episodes of seizure like activity - reports convulsions while he is alert.   He reports today that he can always predict his episodes based on the moon phases - has noticed that during a full moon he will have back to back episodes.      He is constantly fatigued.  He says that he doesn't want food or that it doesn't smell or taste right. He continues to have frequent night sweats and GI symptoms.  He is also concerned that he has cysticercosis.      We discussed that his MRI brain shows no signs of cysticercosis and that he has no evidence of CNS cysticercosis.   Discussed also that the events of convulsions with maintained awareness are atypical for epileptic seizures and raise concern for possible conversion disorder.  However, he has had abnormal EEGs with epileptiform activity so epilepsy is still possible.  Offered increasing Keppra until he can get to EMU, but patient declined.  He will need to follow up with GI and PCP for GI/appetite/night sweats symptoms.

## 2023-04-17 NOTE — TELEPHONE ENCOUNTER
----- Message from María Stone sent at 4/17/2023 11:59 AM CDT -----  Regarding: Appt Access  Contact: [phone]  Pts spouse is calling stating pts condition is worsening.  He's continuing to have seizures and is not eating.  Next avail is 06/2023.  Pts spouse is requesting a virtual appt and a call back.  She has questions.

## 2023-04-17 NOTE — TELEPHONE ENCOUNTER
----- Message from Kami Mosley sent at 2023 11:13 AM CDT -----  Contact: WIFE - JAVON Wynne Jr.  MRN: [id number]  : 1975  PCP: Zenobia Dunbar  Home Phone      [phone]  Work Phone      Not on file.  Mobile          [phone]      MESSAGE: Wife states that the patient is on 2 different kinds of seizure medications but is still having seizures on a daily basis.  She states that he is always fatigued, has no motivation and finds it hard to get out of bed and get anything accomplished.  She would like to know what Dr. Sarabia's recommendations might be.          Phone: [phone]

## 2023-04-18 ENCOUNTER — PATIENT MESSAGE (OUTPATIENT)
Dept: NEUROLOGY | Facility: CLINIC | Age: 48
End: 2023-04-18
Payer: COMMERCIAL

## 2023-04-19 ENCOUNTER — TELEPHONE (OUTPATIENT)
Dept: INTERNAL MEDICINE | Facility: CLINIC | Age: 48
End: 2023-04-19
Payer: COMMERCIAL

## 2023-04-19 NOTE — TELEPHONE ENCOUNTER
----- Message from Qiana Sheldon sent at 4/18/2023  2:32 PM CDT -----  Contact: Mrs Wynne/[phone]  Mrs Wynne called, in regards patient is near an [facility] in [facility], and will be good if he can go to that place, something to keep him hydrated it because patient has not been eating or drinking, and also want to check on referral (if has been sent yet, or not). Please call and advise. Thank you.

## 2023-04-20 ENCOUNTER — OFFICE VISIT (OUTPATIENT)
Dept: INFECTIOUS DISEASES | Facility: CLINIC | Age: 48
End: 2023-04-20
Payer: COMMERCIAL

## 2023-04-20 ENCOUNTER — PATIENT MESSAGE (OUTPATIENT)
Dept: INTERNAL MEDICINE | Facility: CLINIC | Age: 48
End: 2023-04-20
Payer: COMMERCIAL

## 2023-04-20 VITALS
HEIGHT: 70 IN | DIASTOLIC BLOOD PRESSURE: 80 MMHG | TEMPERATURE: 99 F | HEART RATE: 73 BPM | BODY MASS INDEX: 23.74 KG/M2 | WEIGHT: 165.81 LBS | SYSTOLIC BLOOD PRESSURE: 120 MMHG

## 2023-04-20 DIAGNOSIS — R19.7 DIARRHEA, UNSPECIFIED TYPE: ICD-10-CM

## 2023-04-20 LAB
FINAL PATHOLOGIC DIAGNOSIS: NORMAL
GROSS: NORMAL
Lab: NORMAL

## 2023-04-20 PROCEDURE — 3074F SYST BP LT 130 MM HG: CPT | Mod: CPTII,S$GLB,, | Performed by: STUDENT IN AN ORGANIZED HEALTH CARE EDUCATION/TRAINING PROGRAM

## 2023-04-20 PROCEDURE — 3008F BODY MASS INDEX DOCD: CPT | Mod: CPTII,S$GLB,, | Performed by: STUDENT IN AN ORGANIZED HEALTH CARE EDUCATION/TRAINING PROGRAM

## 2023-04-20 PROCEDURE — 99999 PR PBB SHADOW E&M-EST. PATIENT-LVL III: ICD-10-PCS | Mod: PBBFAC,,, | Performed by: STUDENT IN AN ORGANIZED HEALTH CARE EDUCATION/TRAINING PROGRAM

## 2023-04-20 PROCEDURE — 3079F DIAST BP 80-89 MM HG: CPT | Mod: CPTII,S$GLB,, | Performed by: STUDENT IN AN ORGANIZED HEALTH CARE EDUCATION/TRAINING PROGRAM

## 2023-04-20 PROCEDURE — 99214 PR OFFICE/OUTPT VISIT, EST, LEVL IV, 30-39 MIN: ICD-10-PCS | Mod: S$GLB,,, | Performed by: STUDENT IN AN ORGANIZED HEALTH CARE EDUCATION/TRAINING PROGRAM

## 2023-04-20 PROCEDURE — 1159F MED LIST DOCD IN RCRD: CPT | Mod: CPTII,S$GLB,, | Performed by: STUDENT IN AN ORGANIZED HEALTH CARE EDUCATION/TRAINING PROGRAM

## 2023-04-20 PROCEDURE — 3079F PR MOST RECENT DIASTOLIC BLOOD PRESSURE 80-89 MM HG: ICD-10-PCS | Mod: CPTII,S$GLB,, | Performed by: STUDENT IN AN ORGANIZED HEALTH CARE EDUCATION/TRAINING PROGRAM

## 2023-04-20 PROCEDURE — 99214 OFFICE O/P EST MOD 30 MIN: CPT | Mod: S$GLB,,, | Performed by: STUDENT IN AN ORGANIZED HEALTH CARE EDUCATION/TRAINING PROGRAM

## 2023-04-20 PROCEDURE — 1159F PR MEDICATION LIST DOCUMENTED IN MEDICAL RECORD: ICD-10-PCS | Mod: CPTII,S$GLB,, | Performed by: STUDENT IN AN ORGANIZED HEALTH CARE EDUCATION/TRAINING PROGRAM

## 2023-04-20 PROCEDURE — 99999 PR PBB SHADOW E&M-EST. PATIENT-LVL III: CPT | Mod: PBBFAC,,, | Performed by: STUDENT IN AN ORGANIZED HEALTH CARE EDUCATION/TRAINING PROGRAM

## 2023-04-20 PROCEDURE — 3074F PR MOST RECENT SYSTOLIC BLOOD PRESSURE < 130 MM HG: ICD-10-PCS | Mod: CPTII,S$GLB,, | Performed by: STUDENT IN AN ORGANIZED HEALTH CARE EDUCATION/TRAINING PROGRAM

## 2023-04-20 PROCEDURE — 3008F PR BODY MASS INDEX (BMI) DOCUMENTED: ICD-10-PCS | Mod: CPTII,S$GLB,, | Performed by: STUDENT IN AN ORGANIZED HEALTH CARE EDUCATION/TRAINING PROGRAM
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
## 2023-04-21 PROBLEM — R19.7 DIARRHEA: Status: ACTIVE | Noted: 2023-04-21

## 2023-04-27 ENCOUNTER — OFFICE VISIT (OUTPATIENT)
Dept: GASTROENTEROLOGY | Facility: CLINIC | Age: 48
End: 2023-04-27
Payer: COMMERCIAL

## 2023-04-27 ENCOUNTER — TELEPHONE (OUTPATIENT)
Dept: GASTROENTEROLOGY | Facility: CLINIC | Age: 48
End: 2023-04-27

## 2023-04-27 DIAGNOSIS — E80.20: Primary | ICD-10-CM

## 2023-04-27 DIAGNOSIS — R41.89 BRAIN FOG: ICD-10-CM

## 2023-04-27 PROCEDURE — 99214 OFFICE O/P EST MOD 30 MIN: CPT | Mod: 95,,, | Performed by: INTERNAL MEDICINE

## 2023-04-27 PROCEDURE — 99214 PR OFFICE/OUTPT VISIT, EST, LEVL IV, 30-39 MIN: ICD-10-PCS | Mod: 95,,, | Performed by: INTERNAL MEDICINE

## 2023-04-27 NOTE — TELEPHONE ENCOUNTER
----- Message from Angelo Salgado MD sent at 4/27/2023  2:03 PM CDT -----  Urine tests ordered    Needs to see hematology
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
## 2023-05-02 ENCOUNTER — OFFICE VISIT (OUTPATIENT)
Dept: HEMATOLOGY/ONCOLOGY | Facility: CLINIC | Age: 48
End: 2023-05-02
Payer: COMMERCIAL

## 2023-05-02 DIAGNOSIS — E53.8 B12 DEFICIENCY DUE TO DIET: Primary | ICD-10-CM

## 2023-05-02 DIAGNOSIS — E80.20: ICD-10-CM

## 2023-05-02 DIAGNOSIS — K59.1 FUNCTIONAL DIARRHEA: ICD-10-CM

## 2023-05-02 PROCEDURE — 99205 OFFICE O/P NEW HI 60 MIN: CPT | Mod: 95,,, | Performed by: INTERNAL MEDICINE

## 2023-05-02 PROCEDURE — 99205 PR OFFICE/OUTPT VISIT, NEW, LEVL V, 60-74 MIN: ICD-10-PCS | Mod: 95,,, | Performed by: INTERNAL MEDICINE

## 2023-05-02 NOTE — PROGRESS NOTES
Hematology and Medical Oncology   New Patient Consult     05/02/2023  Referred by:  Dr. Angelo Salgado    Reason For Referral: Question of porphyria     Telemedicine Documentation:  The patient location is: home  The chief complaint leading to consultation is: question of porphyria    Visit type: audiovisual    Face to Face time with patient: 20  35 minutes of total time spent on the encounter, which includes face to face time and non-face to face time preparing to see the patient (eg, review of tests), Obtaining and/or reviewing separately obtained history, Documenting clinical information in the electronic or other health record, Independently interpreting results (not separately reported) and communicating results to the patient/family/caregiver, or Care coordination (not separately reported).         Each patient to whom he or she provides medical services by telemedicine is:  (1) informed of the relationship between the physician and patient and the respective role of any other health care provider with respect to management of the patient; and (2) notified that he or she may decline to receive medical services by telemedicine and may withdraw from such care at any time.      History of Present Ilness:   Luis Daniel Bond) is a pleasant 47 y.o.male who presents virtually with his wife by his side.    Today feels like his energy is about 60%. Put on weight the last few days. Feels like vision is now blurry. Vision problems use to come and go, but now nothing makes it better.     Currently eating well, with no restrictions or limitations. When he has an attack has no appetite, things taste and smell bad. During these times he has difficulty remaining hydrated.    Last episode duration was 3-4 weeks. Previously episodes would last 3-4 days and there would be about 4 weeks between episodes.     During the attacks he experiences no pain. He is not having bowel movements. Making very little urine.      The attacks have been happening for about 5 years.     Suddenly lactose intolerant.     Experiencing memory loss - [place].    Feels like attacks happen primarily at night.     Gets sweats, diarrhea, blurry vision during attacks.     GI suggests that this could be porphyria. Initial labs drawn. Repeat labs collected today.    PAST MEDICAL HISTORY:   Past Medical History:   Diagnosis Date    Depression     Fibromyalgia     Marijuana use     Peptic ulcer     Scoliosis        PAST SURGICAL HISTORY:   Past Surgical History:   Procedure Laterality Date    COLONOSCOPY N/A 4/5/2023    Procedure: COLONOSCOPY sutab;  Surgeon: Angelo Salgado MD;  Location: [facility];  Service: Endoscopy;  Laterality: N/A;    ESOPHAGOGASTRODUODENOSCOPY N/A 4/5/2023    Procedure: EGD (ESOPHAGOGASTRODUODENOSCOPY);  Surgeon: Angelo Salgado MD;  Location: [facility];  Service: Endoscopy;  Laterality: N/A;    KNEE ARTHROSCOPY         PAST SOCIAL HISTORY:   reports that he quit smoking about 6 years ago. His smoking use included cigarettes. He smoked an average of .5 packs per day. He uses smokeless tobacco. He reports that he does not currently use alcohol. He reports current drug use. Drug: Marijuana.    FAMILY HISTORY:  Family History   Problem Relation Age of Onset    No Known Problems Mother     No Known Problems Father     Heart disease Neg Hx     Hypertension Neg Hx        CURRENT MEDICATIONS:   Current Outpatient Medications   Medication Sig    cyanocobalamin-cobamamide 5,000-100 mcg Subl Place 5,000 mcg under the tongue Daily. Patient states taking 1/2 tab.    dextroamphetamine-amphetamine (ADDERALL XR) 20 MG 24 hr capsule Patient states has not been taking since started with seizures.    ibuprofen (ADVIL,MOTRIN) 800 MG tablet Take 1 tablet (800 mg total) by mouth 3 (three) times daily as needed for Pain.    lacosamide (VIMPAT) 200 mg Tab tablet Take 1 tablet (200 mg total) by mouth every 12  (twelve) hours.    levETIRAcetam (KEPPRA) 500 MG Tab Take 1 tablet (500 mg total) by mouth 2 (two) times daily.    nicotine polacrilex 4 MG Lozg Take 4 mg by mouth as needed.    testosterone (ANDROGEL) 20.25 mg/1.25 gram (1.62 %) GlPm Apply 2 pumps to shoulders daily    venlafaxine (EFFEXOR-XR) 150 MG Cp24 Take 300 mg by mouth once daily.     No current facility-administered medications for this visit.     ALLERGIES:   Review of patient's allergies indicates:  No Known Allergies      Review of Systems:     Review of Systems   Constitutional:  Positive for appetite change and unexpected weight change.   HENT:   Positive for trouble swallowing. Negative for mouth sores.    Eyes:  Positive for eye problems.   Respiratory:  Negative for cough and shortness of breath.    Cardiovascular:  Negative for chest pain.   Gastrointestinal:  Positive for diarrhea and nausea.   Genitourinary:  Positive for frequency.    Musculoskeletal:  Positive for back pain.   Skin:  Negative for rash.   Neurological:  Positive for dizziness. Negative for headaches.   Hematological:  Negative for adenopathy.   Psychiatric/Behavioral:  Positive for sleep disturbance. The patient is nervous/anxious.         Physical Exam:     Limited secondary to virtual visit    ECOG Performance Status: (foot note - ECOG PS provided by Eastern Cooperative Oncology Group) 2 - Symptomatic, <50% confined to bed    Karnofsky Performance Score:  80%- Normal Activity with Effort: Some Symptoms of Disease    Labs:   Lab Results   Component Value Date    WBC 8.54 04/03/2023    HGB 15.4 04/03/2023    HCT 44.9 04/03/2023     04/03/2023    CHOL 153 06/15/2022    TRIG 182 (H) 06/15/2022    HDL 37 (L) 06/15/2022    ALT 25 04/03/2023    AST 24 04/03/2023     04/03/2023    K 4.0 04/03/2023     04/03/2023    CREATININE 0.82 04/03/2023    BUN 12 04/03/2023    CO2 26 04/03/2023    TSH 1.960 04/03/2023    HGBA1C 5.0 12/01/2011     Tests from  4/3/23:  Uroporphyrin: 45 [elevated]  Pophobilinogen: 1.1    In this sample, the excretion of uroporphyrin was elevated. This finding is likely related to increased stimulation of the heme-forming system by a stressor, such as ethanol, a   variety of medications (sulfonamides, aminopyrine or diallyl barbiturate), a physiologic steroid or exposure to arsenic.     5/2/23  Uroporphyhyrin: 16  In this sample, the porphyrins profile was normal.     Total Porphyrins: pending    Imaging: Previous imaging has been reviewed    Assessment and Plan:     Mr. Wynne is a pleasant 47 year old with a question of porphyria.    Uroporphyrinuria  --mildly elevated in a single read, repeat urine study is within normal limits  --porphyrin plasma level pending  --some symptoms patient is experiencing are consistent with porphyria diagnosis  --Will follow up once all testing has resulted  --At this time no indication for hemin therapy        25 minutes were spent face to face with the patient and his family to discuss the disease, natural history, and treatment options. I have provided the patient with an opportunity to ask questions and have all questions answered to his satisfaction.       he will return to clinic in 1 week virtually, but knows to call in the interim if symptoms change or should a problem arise.        Irais Palomo MD  Hematology and Medical Oncology  Bone Marrow Transplant  [facility]      BMT Chart Routing      Follow up with physician 1 week. 1. follow up virtually tuesday pm 5/9 or 5/23   Follow up with SHIRLEY    Provider visit type    Infusion scheduling note    Injection scheduling note    Labs    Imaging    Pharmacy appointment    Other referrals

## 2023-05-03 ENCOUNTER — PATIENT MESSAGE (OUTPATIENT)
Dept: GASTROENTEROLOGY | Facility: CLINIC | Age: 48
End: 2023-05-03
Payer: COMMERCIAL

## 2023-05-04 ENCOUNTER — PATIENT MESSAGE (OUTPATIENT)
Dept: HEMATOLOGY/ONCOLOGY | Facility: CLINIC | Age: 48
End: 2023-05-04
Payer: COMMERCIAL

## 2023-05-08 ENCOUNTER — OFFICE VISIT (OUTPATIENT)
Dept: UROLOGY | Facility: CLINIC | Age: 48
End: 2023-05-08
Payer: COMMERCIAL

## 2023-05-08 VITALS
DIASTOLIC BLOOD PRESSURE: 89 MMHG | BODY MASS INDEX: 24.68 KG/M2 | WEIGHT: 172.38 LBS | HEART RATE: 77 BPM | SYSTOLIC BLOOD PRESSURE: 120 MMHG | HEIGHT: 70 IN

## 2023-05-08 DIAGNOSIS — N50.89 MASS OF LEFT TESTICLE: Primary | ICD-10-CM

## 2023-05-08 PROCEDURE — 99999 PR PBB SHADOW E&M-EST. PATIENT-LVL III: ICD-10-PCS | Mod: PBBFAC,,, | Performed by: NURSE PRACTITIONER

## 2023-05-08 PROCEDURE — 1160F RVW MEDS BY RX/DR IN RCRD: CPT | Mod: CPTII,S$GLB,, | Performed by: NURSE PRACTITIONER

## 2023-05-08 PROCEDURE — 99214 OFFICE O/P EST MOD 30 MIN: CPT | Mod: S$GLB,,, | Performed by: NURSE PRACTITIONER

## 2023-05-08 PROCEDURE — 99214 PR OFFICE/OUTPT VISIT, EST, LEVL IV, 30-39 MIN: ICD-10-PCS | Mod: S$GLB,,, | Performed by: NURSE PRACTITIONER

## 2023-05-08 PROCEDURE — 3008F PR BODY MASS INDEX (BMI) DOCUMENTED: ICD-10-PCS | Mod: CPTII,S$GLB,, | Performed by: NURSE PRACTITIONER

## 2023-05-08 PROCEDURE — 1160F PR REVIEW ALL MEDS BY PRESCRIBER/CLIN PHARMACIST DOCUMENTED: ICD-10-PCS | Mod: CPTII,S$GLB,, | Performed by: NURSE PRACTITIONER

## 2023-05-08 PROCEDURE — 1159F PR MEDICATION LIST DOCUMENTED IN MEDICAL RECORD: ICD-10-PCS | Mod: CPTII,S$GLB,, | Performed by: NURSE PRACTITIONER

## 2023-05-08 PROCEDURE — 3079F DIAST BP 80-89 MM HG: CPT | Mod: CPTII,S$GLB,, | Performed by: NURSE PRACTITIONER

## 2023-05-08 PROCEDURE — 3074F PR MOST RECENT SYSTOLIC BLOOD PRESSURE < 130 MM HG: ICD-10-PCS | Mod: CPTII,S$GLB,, | Performed by: NURSE PRACTITIONER

## 2023-05-08 PROCEDURE — 99999 PR PBB SHADOW E&M-EST. PATIENT-LVL III: CPT | Mod: PBBFAC,,, | Performed by: NURSE PRACTITIONER

## 2023-05-08 PROCEDURE — 3074F SYST BP LT 130 MM HG: CPT | Mod: CPTII,S$GLB,, | Performed by: NURSE PRACTITIONER

## 2023-05-08 PROCEDURE — 3079F PR MOST RECENT DIASTOLIC BLOOD PRESSURE 80-89 MM HG: ICD-10-PCS | Mod: CPTII,S$GLB,, | Performed by: NURSE PRACTITIONER

## 2023-05-08 PROCEDURE — 3008F BODY MASS INDEX DOCD: CPT | Mod: CPTII,S$GLB,, | Performed by: NURSE PRACTITIONER

## 2023-05-08 PROCEDURE — 1159F MED LIST DOCD IN RCRD: CPT | Mod: CPTII,S$GLB,, | Performed by: NURSE PRACTITIONER

## 2023-05-08 NOTE — PROGRESS NOTES
CHIEF COMPLAINT:    Mr. Wynne is a 47 y.o. male presenting for   Chief Complaint   Patient presents with    Other     Bump on L testicle noticed it Thursday.        PRESENTING ILLNESS:    Luis Daniel Wynne Jr. is a 47 y.o. male with a PMH of edgard, bph,male hypogonadism, who presents for left testicular lump.     Established patient to urology department. Presents today due to left testicular lump.  Patient reports performing self checks every month.  Noticed lump above left testicle on Thursday of last week.  Lump is painless, no redness or swelling surrounding lump.  No family history of testicular or prostate cancer.    He reports a good urinary stream and complete bladder emptying.  Has no urinary complaints today.    Has history of male hypogonadism.  Previously on testosterone replacement, however now stopped.    REVIEW OF SYSTEMS:    Review of Systems   Constitutional:  Negative for chills and fever.   Respiratory:  Negative for shortness of breath.    Cardiovascular:  Negative for chest pain.   Gastrointestinal:  Negative for constipation and diarrhea.   Genitourinary:  Negative for dysuria, flank pain, frequency, hematuria and urgency.        Lump above left testicle   Neurological:  Negative for dizziness and weakness.     PATIENT HISTORY:    Past Medical History:   Diagnosis Date    Depression     Fibromyalgia     Marijuana use     Peptic ulcer     Scoliosis        Family History   Problem Relation Age of Onset    No Known Problems Mother     No Known Problems Father     Heart disease Neg Hx     Hypertension Neg Hx        Allergies:  Patient has no known allergies.    Medications:    Current Outpatient Medications:     cyanocobalamin-cobamamide 5,000-100 mcg Subl, Place 5,000 mcg under the tongue Daily. Patient states taking 1/2 tab., Disp: , Rfl:     dextroamphetamine-amphetamine (ADDERALL XR) 20 MG 24 hr capsule, Patient states has not been taking since started with seizures., Disp: , Rfl: 0     ibuprofen (ADVIL,MOTRIN) 800 MG tablet, Take 1 tablet (800 mg total) by mouth 3 (three) times daily as needed for Pain., Disp: 30 tablet, Rfl: 3    lacosamide (VIMPAT) 200 mg Tab tablet, Take 1 tablet (200 mg total) by mouth every 12 (twelve) hours., Disp: 60 tablet, Rfl: 5    levETIRAcetam (KEPPRA) 500 MG Tab, Take 1 tablet (500 mg total) by mouth 2 (two) times daily., Disp: 60 tablet, Rfl: 11    nicotine polacrilex 4 MG Lozg, Take 4 mg by mouth as needed., Disp: , Rfl:     testosterone (ANDROGEL) 20.25 mg/1.25 gram (1.62 %) GlPm, Apply 2 pumps to shoulders daily, Disp: 1 each, Rfl: 5    venlafaxine (EFFEXOR-XR) 150 MG Cp24, Take 300 mg by mouth once daily., Disp: , Rfl:     PHYSICAL EXAMINATION:    Physical Exam  Vitals and nursing note reviewed.   Constitutional:       Appearance: Normal appearance. He is well-developed.   HENT:      Head: Normocephalic and atraumatic.   Eyes:      Pupils: Pupils are equal, round, and reactive to light.   Pulmonary:      Effort: Pulmonary effort is normal.   Genitourinary:     Penis: Normal.       Testes:         Right: Mass, tenderness or swelling not present.         Left: Mass present. Tenderness or swelling not present.       Musculoskeletal:         General: Normal range of motion.      Cervical back: Normal range of motion.   Skin:     General: Skin is warm and dry.   Neurological:      Mental Status: He is alert and oriented to person, place, and time.   Psychiatric:         Behavior: Behavior normal.         LABS:    U/a performed in office today: did not void  No results found for: PSA, PSADIAG, PSATOTAL, PSAFREE, PSAFREEPCT    IMPRESSION:  Encounter Diagnoses   Name Primary?    Mass of left testicle Yes         PLAN:  Problem List Items Addressed This Visit    None  Visit Diagnoses       Mass of left testicle    -  Primary    Relevant Orders    US Scrotum And Testicles            1. Left testicular mass vs epididymal cyst   - obtain scrotal ultrasound   - will complete  further work up including labs afp, b-hcg, and ldh based on imaging  2. Male hypogonadism   - not interested in replacement at this time   - testosterone level 296  3. Rtc based on imaging  Grace Jane NP    I spent 30 minutes with the patient. Over 50% of the visit was spent in counseling.

## 2023-05-08 NOTE — PROGRESS NOTES
Hematology and Medical Oncology   New Patient Consult     05/09/2023    Reason For Referral: Question of porphyria     Telemedicine Documentation:  The patient location is: home  The chief complaint leading to consultation is: question of porphyria    Visit type: audiovisual    Face to Face time with patient: 20  35 minutes of total time spent on the encounter, which includes face to face time and non-face to face time preparing to see the patient (eg, review of tests), Obtaining and/or reviewing separately obtained history, Documenting clinical information in the electronic or other health record, Independently interpreting results (not separately reported) and communicating results to the patient/family/caregiver, or Care coordination (not separately reported).     Each patient to whom he or she provides medical services by telemedicine is:  (1) informed of the relationship between the physician and patient and the respective role of any other health care provider with respect to management of the patient; and (2) notified that he or she may decline to receive medical services by telemedicine and may withdraw from such care at any time.      History of Present Ilness:   Luis Daniel Bond) is a pleasant 47 y.o.male who presents virtually with his wife by his side.    Today feels like his energy is about 60%. Put on weight the last few days. Feels like vision is now blurry. Vision problems use to come and go, but now nothing makes it better.     Currently eating well, with no restrictions or limitations. When he has an attack has no appetite, things taste and smell bad. During these times he has difficulty remaining hydrated.    Last episode duration was 3-4 weeks. Previously episodes would last 3-4 days and there would be about 4 weeks between episodes.     During the attacks he experiences no pain. He is not having bowel movements. Making very little urine.     The attacks have been happening for  about 5 years.     Suddenly lactose intolerant.     Experiencing memory loss - [place]    Feels like attacks happen primarily at night.     Gets sweats, diarrhea, blurry vision during attacks.     GI suggests that this could be porphyria. Initial labs drawn.     Interval History:  Feels like the attacks are happening more regularly and intensity is increasing. Attack started last night and began collecting urine.     Testicular mass felt by wife and workup is ongoing with urology.    PAST MEDICAL HISTORY:   Past Medical History:   Diagnosis Date    Depression     Fibromyalgia     Marijuana use     Peptic ulcer     Scoliosis        PAST SURGICAL HISTORY:   Past Surgical History:   Procedure Laterality Date    COLONOSCOPY N/A 4/5/2023    Procedure: COLONOSCOPY sutab;  Surgeon: Angelo Salgado MD;  Location: [facility];  Service: Endoscopy;  Laterality: N/A;    ESOPHAGOGASTRODUODENOSCOPY N/A 4/5/2023    Procedure: EGD (ESOPHAGOGASTRODUODENOSCOPY);  Surgeon: Angelo Salgado MD;  Location: [facility];  Service: Endoscopy;  Laterality: N/A;    KNEE ARTHROSCOPY         PAST SOCIAL HISTORY:   reports that he quit smoking about 6 years ago. His smoking use included cigarettes. He smoked an average of .5 packs per day. He uses smokeless tobacco. He reports that he does not currently use alcohol. He reports current drug use. Drug: Marijuana.    FAMILY HISTORY:  Family History   Problem Relation Age of Onset    No Known Problems Mother     No Known Problems Father     Heart disease Neg Hx     Hypertension Neg Hx        CURRENT MEDICATIONS:   Current Outpatient Medications   Medication Sig    cyanocobalamin-cobamamide 5,000-100 mcg Subl Place 5,000 mcg under the tongue Daily. Patient states taking 1/2 tab.    dextroamphetamine-amphetamine (ADDERALL XR) 20 MG 24 hr capsule Patient states has not been taking since started with seizures.    ibuprofen (ADVIL,MOTRIN) 800 MG tablet Take 1 tablet (800 mg total)  by mouth 3 (three) times daily as needed for Pain.    lacosamide (VIMPAT) 200 mg Tab tablet Take 1 tablet (200 mg total) by mouth every 12 (twelve) hours.    levETIRAcetam (KEPPRA) 500 MG Tab Take 1 tablet (500 mg total) by mouth 2 (two) times daily.    nicotine polacrilex 4 MG Lozg Take 4 mg by mouth as needed.    testosterone (ANDROGEL) 20.25 mg/1.25 gram (1.62 %) GlPm Apply 2 pumps to shoulders daily    venlafaxine (EFFEXOR-XR) 150 MG Cp24 Take 300 mg by mouth once daily.     No current facility-administered medications for this visit.     ALLERGIES:   Review of patient's allergies indicates:  No Known Allergies      Review of Systems:     Review of Systems   Constitutional:  Positive for appetite change and unexpected weight change.   HENT:   Positive for trouble swallowing. Negative for mouth sores.    Eyes:  Positive for eye problems.   Respiratory:  Negative for cough and shortness of breath.    Cardiovascular:  Negative for chest pain.   Gastrointestinal:  Positive for diarrhea and nausea.   Genitourinary:  Positive for frequency.    Musculoskeletal:  Positive for back pain.   Skin:  Negative for rash.   Neurological:  Positive for dizziness. Negative for headaches.   Hematological:  Negative for adenopathy.   Psychiatric/Behavioral:  Positive for sleep disturbance. The patient is nervous/anxious.         Physical Exam:     Limited secondary to virtual visit    ECOG Performance Status: (foot note - ECOG PS provided by Eastern Cooperative Oncology Group) 2 - Symptomatic, <50% confined to bed    Karnofsky Performance Score:  80%- Normal Activity with Effort: Some Symptoms of Disease    Labs:   Lab Results   Component Value Date    WBC 8.54 04/03/2023    HGB 15.4 04/03/2023    HCT 44.9 04/03/2023     04/03/2023    CHOL 153 06/15/2022    TRIG 182 (H) 06/15/2022    HDL 37 (L) 06/15/2022    ALT 25 04/03/2023    AST 24 04/03/2023     04/03/2023    K 4.0 04/03/2023     04/03/2023    CREATININE  0.82 04/03/2023    BUN 12 04/03/2023    CO2 26 04/03/2023    TSH 1.960 04/03/2023    HGBA1C 5.0 12/01/2011     Tests from 4/3/23:  Uroporphyrin: 45 [elevated]  Pophobilinogen: 1.1    In this sample, the excretion of uroporphyrin was elevated. This finding is likely related to increased stimulation of the heme-forming system by a stressor, such as ethanol, a   variety of medications (sulfonamides, aminopyrine or diallyl barbiturate), a physiologic steroid or exposure to arsenic.     5/2/23  Uroporphyhyrin: 16  In this sample, the porphyrins profile was normal.     Total Porphyrins: Total plasma porphyrins are normal, fractionation not   performed.     Imaging: Previous imaging has been reviewed    Assessment and Plan:     Mr. Wynne is a pleasant 47 year old with a question of porphyria.    Uroporphyrinuria  --mildly elevated in a single read, repeat urine study is within normal limits  --porphyrin plasma level pending  --some symptoms patient is experiencing are consistent with porphyria diagnosis  --Most recent labs 5/2 are normal values  --Repeat lab orders placed for imminent attack and easier collection of labs/urine  --Will follow up once all testing has resulted  --At this time no indication for hemin therapy  --If no answer identified encourage him to go to a tertiary referral center like [facility]      25 minutes were spent face to face with the patient and his family to discuss the disease, natural history, and treatment options. I have provided the patient with an opportunity to ask questions and have all questions answered to his satisfaction.       he will return to clinic as needed as testing results, but knows to call in the interim if symptoms change or should a problem arise.        Irais Palomo MD  Hematology and Medical Oncology  Bone Marrow Transplant  [facility]      BMT Chart Routing      Follow up with physician Other. As needed   Follow up with SHIRLEY    Provider visit type     Infusion scheduling note    Injection scheduling note    Labs    Imaging    Pharmacy appointment    Other referrals

## 2023-05-09 ENCOUNTER — OFFICE VISIT (OUTPATIENT)
Dept: HEMATOLOGY/ONCOLOGY | Facility: CLINIC | Age: 48
End: 2023-05-09
Payer: COMMERCIAL

## 2023-05-09 DIAGNOSIS — K59.1 FUNCTIONAL DIARRHEA: ICD-10-CM

## 2023-05-09 DIAGNOSIS — N50.89 TESTICULAR MASS: ICD-10-CM

## 2023-05-09 DIAGNOSIS — E80.21 ACUTE INTERMITTENT PORPHYRIA: Primary | ICD-10-CM

## 2023-05-09 PROCEDURE — 99215 PR OFFICE/OUTPT VISIT, EST, LEVL V, 40-54 MIN: ICD-10-PCS | Mod: 95,,, | Performed by: INTERNAL MEDICINE

## 2023-05-09 PROCEDURE — 99215 OFFICE O/P EST HI 40 MIN: CPT | Mod: 95,,, | Performed by: INTERNAL MEDICINE

## 2023-05-11 ENCOUNTER — PATIENT MESSAGE (OUTPATIENT)
Dept: HEMATOLOGY/ONCOLOGY | Facility: CLINIC | Age: 48
End: 2023-05-11
Payer: COMMERCIAL

## 2023-05-11 NOTE — TELEPHONE ENCOUNTER
Discussed with Dr. Palomo who is advising further evaluation of symptoms with PCP. No urine sample needed at this time per Dr. Palomo.

## 2023-05-12 ENCOUNTER — PATIENT MESSAGE (OUTPATIENT)
Dept: HEMATOLOGY/ONCOLOGY | Facility: CLINIC | Age: 48
End: 2023-05-12
Payer: COMMERCIAL

## 2023-05-13 ENCOUNTER — HOSPITAL ENCOUNTER (EMERGENCY)
Facility: HOSPITAL | Age: 48
Discharge: HOME | End: 2023-05-13
Attending: EMERGENCY MEDICINE
Payer: COMMERCIAL

## 2023-05-13 VITALS
TEMPERATURE: 99 F | HEART RATE: 68 BPM | BODY MASS INDEX: 24.68 KG/M2 | OXYGEN SATURATION: 100 % | WEIGHT: 172 LBS | SYSTOLIC BLOOD PRESSURE: 153 MMHG | DIASTOLIC BLOOD PRESSURE: 76 MMHG | RESPIRATION RATE: 18 BRPM

## 2023-05-13 DIAGNOSIS — Z71.1 PERSON WITH FEARED COMPLAINT IN WHOM NO DIAGNOSIS IS MADE: Primary | ICD-10-CM

## 2023-05-13 LAB
ALBUMIN SERPL BCP-MCNC: 4.7 G/DL (ref 3.5–5.2)
ALP SERPL-CCNC: 87 U/L (ref 55–135)
ALT SERPL W/O P-5'-P-CCNC: 28 U/L (ref 10–44)
ANION GAP SERPL CALC-SCNC: 11 MMOL/L (ref 8–16)
AST SERPL-CCNC: 20 U/L (ref 10–40)
BASOPHILS # BLD AUTO: 0.02 K/UL (ref 0–0.2)
BASOPHILS NFR BLD: 0.2 % (ref 0–1.9)
BILIRUB SERPL-MCNC: 1.4 MG/DL (ref 0.1–1)
BUN SERPL-MCNC: 11 MG/DL (ref 6–20)
CALCIUM SERPL-MCNC: 10.4 MG/DL (ref 8.7–10.5)
CHLORIDE SERPL-SCNC: 104 MMOL/L (ref 95–110)
CO2 SERPL-SCNC: 26 MMOL/L (ref 23–29)
CREAT SERPL-MCNC: 1 MG/DL (ref 0.5–1.4)
DIFFERENTIAL METHOD: ABNORMAL
EOSINOPHIL # BLD AUTO: 0.1 K/UL (ref 0–0.5)
EOSINOPHIL NFR BLD: 0.6 % (ref 0–8)
ERYTHROCYTE [DISTWIDTH] IN BLOOD BY AUTOMATED COUNT: 12.8 % (ref 11.5–14.5)
EST. GFR  (NO RACE VARIABLE): >60 ML/MIN/1.73 M^2
GLUCOSE SERPL-MCNC: 105 MG/DL (ref 70–110)
HCT VFR BLD AUTO: 48.4 % (ref 40–54)
HCV AB SERPL QL IA: NORMAL
HGB BLD-MCNC: 16.4 G/DL (ref 14–18)
HIV 1+2 AB+HIV1 P24 AG SERPL QL IA: NORMAL
IMM GRANULOCYTES # BLD AUTO: 0.06 K/UL (ref 0–0.04)
IMM GRANULOCYTES NFR BLD AUTO: 0.5 % (ref 0–0.5)
LYMPHOCYTES # BLD AUTO: 1.5 K/UL (ref 1–4.8)
LYMPHOCYTES NFR BLD: 12.4 % (ref 18–48)
MCH RBC QN AUTO: 31.1 PG (ref 27–31)
MCHC RBC AUTO-ENTMCNC: 33.9 G/DL (ref 32–36)
MCV RBC AUTO: 92 FL (ref 82–98)
MONOCYTES # BLD AUTO: 0.9 K/UL (ref 0.3–1)
MONOCYTES NFR BLD: 7.5 % (ref 4–15)
NEUTROPHILS # BLD AUTO: 9.8 K/UL (ref 1.8–7.7)
NEUTROPHILS NFR BLD: 78.8 % (ref 38–73)
NRBC BLD-RTO: 0 /100 WBC
PLATELET # BLD AUTO: 284 K/UL (ref 150–450)
PMV BLD AUTO: 10.6 FL (ref 9.2–12.9)
POTASSIUM SERPL-SCNC: 3.9 MMOL/L (ref 3.5–5.1)
PROT SERPL-MCNC: 8.4 G/DL (ref 6–8.4)
RBC # BLD AUTO: 5.27 M/UL (ref 4.6–6.2)
SODIUM SERPL-SCNC: 141 MMOL/L (ref 136–145)
WBC # BLD AUTO: 12.38 K/UL (ref 3.9–12.7)

## 2023-05-13 PROCEDURE — 96360 HYDRATION IV INFUSION INIT: CPT

## 2023-05-13 PROCEDURE — 87389 HIV-1 AG W/HIV-1&-2 AB AG IA: CPT | Performed by: EMERGENCY MEDICINE

## 2023-05-13 PROCEDURE — 86803 HEPATITIS C AB TEST: CPT | Performed by: EMERGENCY MEDICINE

## 2023-05-13 PROCEDURE — 80053 COMPREHEN METABOLIC PANEL: CPT | Performed by: PHYSICIAN ASSISTANT

## 2023-05-13 PROCEDURE — 85025 COMPLETE CBC W/AUTO DIFF WBC: CPT | Performed by: PHYSICIAN ASSISTANT

## 2023-05-13 PROCEDURE — 99285 PR EMERGENCY DEPT VISIT,LEVEL V: ICD-10-PCS | Mod: ,,, | Performed by: EMERGENCY MEDICINE

## 2023-05-13 PROCEDURE — 84311 SPECTROPHOTOMETRY: CPT | Performed by: PHYSICIAN ASSISTANT

## 2023-05-13 PROCEDURE — 82657 ENZYME CELL ACTIVITY: CPT | Performed by: PHYSICIAN ASSISTANT

## 2023-05-13 PROCEDURE — 99284 EMERGENCY DEPT VISIT MOD MDM: CPT | Mod: 25

## 2023-05-13 PROCEDURE — 99285 EMERGENCY DEPT VISIT HI MDM: CPT | Mod: ,,, | Performed by: EMERGENCY MEDICINE

## 2023-05-13 PROCEDURE — 25000003 PHARM REV CODE 250: Performed by: PHYSICIAN ASSISTANT

## 2023-05-13 PROCEDURE — 82657 ENZYME CELL ACTIVITY: CPT | Mod: 91 | Performed by: PHYSICIAN ASSISTANT

## 2023-05-13 RX ADMIN — SODIUM CHLORIDE 1000 ML: 9 INJECTION, SOLUTION INTRAVENOUS at 05:05

## 2023-05-13 NOTE — DISCHARGE INSTRUCTIONS
Some of your blood tests are pending.  Please contact your hematologist in the next 1-2 weeks for analysis of the results.     Return to the ER for any new or significantly worsening symptoms such as generalized convulsive seizure activity for a prolonged amount of time, persistent confusion where patient does not return to baseline mental status or any other worrisome symptoms.

## 2023-05-13 NOTE — ED TRIAGE NOTES
Seizure Activity (Seen yesterday for seizure like activity. Has been having neurological issues past year. Worked up yesterday at [facility] and d/c. Recently put on seizure medication and states symptoms are worse. Appears anxious. Had arm paralysis episode this morning)

## 2023-05-13 NOTE — ED PROVIDER NOTES
"Encounter Date: 5/13/2023       History     Chief Complaint   Patient presents with    Seizure Activity     Seen yesterday for seizure like activity. Has been having neurological issues past year. Worked up yesterday at [facility] and d/c. Recently put on seizure medication and states symptoms are worse. Appears anxious. Had arm paralysis episode this morning. Wife states porphyria suspected     47-year-old male with a history of depression, fibromyalgia, marijuana use, peptic ulcer presents to the ED with numerous symptoms.  Patient's wife states that the patient had episodes of muscle paralysis today.  Patient was lying in bed and states that he could not remove the covers from himself and asked her to assist him in rolling over in bed because he was unable to do so himself.  Wife states that the patient had"back to back" seizures yesterday prompting an ER visit.  She describes these episodes as full body stiffness.  Patient also occasionally has full body convulsions.  He has been having various new symptoms including headaches, back pain, body temperature changes.  Patient will be hot and sweaty in 1 moment and then very cold in the next.  Wife states that she can feel the heat emanating from his body.  Wife states that the patient does not remember any of the episodes yesterday or this morning.  Patient is currently alert and able to provide history.  No significant abdominal pain reported.  He is diagnosed with seizures.  He has had seizure activity noted on EEG.  He is followed by Neurology and is on AEDs.  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  Patient's wife has researched the diagnosis and states that patient has numerous of these symptoms associated with the porphyria.  She states that she was told that 1 of the definitive tests is obtaining a urine sample right when he is having 1 of his "episodes".  Patient's wife became more " "concerned today with the patient's "muscle paralysis".  She is concerned that his symptoms may progress at home and become life-threatening.     Review of patient's allergies indicates:  No Known Allergies  Past Medical History:   Diagnosis Date    Depression     Fibromyalgia     Marijuana use     Peptic ulcer     Scoliosis      Past Surgical History:   Procedure Laterality Date    COLONOSCOPY N/A 2023    Procedure: COLONOSCOPY sutab;  Surgeon: Angelo Salgado MD;  Location: [facility];  Service: Endoscopy;  Laterality: N/A;    ESOPHAGOGASTRODUODENOSCOPY N/A 2023    Procedure: EGD (ESOPHAGOGASTRODUODENOSCOPY);  Surgeon: Angelo Salgado MD;  Location: [facility];  Service: Endoscopy;  Laterality: N/A;    KNEE ARTHROSCOPY       Family History   Problem Relation Age of Onset    No Known Problems Mother     No Known Problems Father     Heart disease Neg Hx     Hypertension Neg Hx      Social History     Tobacco Use    Smoking status: Former     Packs/day: 0.50     Types: Cigarettes     Quit date: 2016     Years since quittin.6    Smokeless tobacco: Current   Substance Use Topics    Alcohol use: Not Currently    Drug use: Yes     Types: Marijuana     Comment: multiple times per day every day     Review of Systems   Neurological:  Positive for seizures.     Physical Exam     Initial Vitals [23 1249]   BP Pulse Resp Temp SpO2   (!) 190/93 90 18 98.6 °F (37 °C) 98 %      MAP       --         Physical Exam    Nursing note and vitals reviewed.  Constitutional: He appears well-developed and well-nourished.  Non-toxic appearance. He does not appear ill. No distress.   HENT:   Head: Normocephalic and atraumatic.   Eyes: Conjunctivae and EOM are normal. Pupils are equal, round, and reactive to light. No scleral icterus.   Neck: Neck supple.   Normal range of motion.  Cardiovascular:  Normal rate and regular rhythm.     Exam reveals no gallop, no distant heart sounds and no friction rub.       No murmur " heard.  Pulmonary/Chest: Effort normal and breath sounds normal. No accessory muscle usage. No tachypnea. No respiratory distress. He has no decreased breath sounds. He has no wheezes. He has no rhonchi. He has no rales.   Abdominal: He exhibits no distension.   Musculoskeletal:      Cervical back: Normal range of motion and neck supple.     Neurological: He is alert. He has normal strength. No sensory deficit.   Speech is clear and fluent.  No facial droop or asymmetry.    Skin: No rash noted.   Psychiatric: His mood appears anxious.       ED Course   Procedures  Labs Reviewed   CBC W/ AUTO DIFFERENTIAL - Abnormal; Notable for the following components:       Result Value    MCH 31.1 (*)     Gran # (ANC) 9.8 (*)     Immature Grans (Abs) 0.06 (*)     Gran % 78.8 (*)     Lymph % 12.4 (*)     All other components within normal limits   COMPREHENSIVE METABOLIC PANEL - Abnormal; Notable for the following components:    Total Bilirubin 1.4 (*)     All other components within normal limits   HIV 1 / 2 ANTIBODY    Narrative:     Release to patient->Immediate   HEPATITIS C ANTIBODY    Narrative:     Release to patient->Immediate   PBG DEAMINASE, ERYTHROCYTES   PORPHYRINS, TOTAL, PLASMA W/ REFLEX FRACTIONATION   ALA DEHYDRATASE, ERYTHROCYTES          Imaging Results    None          Medications   sodium chloride 0.9% bolus 1,000 mL 1,000 mL (0 mLs Intravenous Stopped 5/13/23 1818)     Medical Decision Making:   History:   Old Medical Records: I decided to obtain old medical records.  Initial Assessment:   47-year-old male presents to the ED with episodes of body paralysis today among many other various symptoms that have been intermittent over several.  Hypertensive but hemodynamically stable.  Afebrile.  Nonfocal neurologic exam.   Clinical Tests:   Lab Tests: Ordered  ED Management:  Patient had no concerning electrolyte or hematologic abnormalities on labs today.  Had extensive workup yesterday in the ED without concerning  findings. I do not feel that full body stiffness vs paralysis while maintaining full alertness is consistent with seizure.  Discussed briefly with hematology for any recommendations.  Patient did not require a full Hematology evaluation as he has not been definitively diagnosed with porphyria.  Per my telephone discussion with Hematology, I have added additional send out labs to assess for porphyria.   Patient requested IV fluids as he has poor intake recently.  Fluids provided.  It is not clear at this time what is the cause of patient's symptoms. I do not detect any immediate, emergent, or life threatening condition/etiology that warrants additional workup or admission today.  I have advised patient and his wife to follow-up in Hematology Clinic for analysis of the special blood tests that were ordered today to definitively rule in/out porphyria diagnosis.  ED return precautions given.   I have reviewed the patient's records and discussed this case with my supervising physician.             Attending Attestation:     Physician Attestation Statement for NP/PA:   I discussed this assessment and plan of this patient with the NP/PA, but I did not personally examine the patient. The face to face encounter was performed by the NP/PA.                         Clinical Impression:   Final diagnoses:  [Z71.1] Person with feared complaint in whom no diagnosis is made (Primary)        ED Disposition Condition    Discharge Stable          ED Prescriptions    None       Follow-up Information    None          Rema Garcia PA-C  05/13/23 2013       Tamir Troncoso Jr., MD  05/14/23 2034

## 2023-05-15 ENCOUNTER — PATIENT MESSAGE (OUTPATIENT)
Dept: HEMATOLOGY/ONCOLOGY | Facility: CLINIC | Age: 48
End: 2023-05-15
Payer: COMMERCIAL

## 2023-05-15 ENCOUNTER — PATIENT MESSAGE (OUTPATIENT)
Dept: INTERNAL MEDICINE | Facility: CLINIC | Age: 48
End: 2023-05-15
Payer: COMMERCIAL

## 2023-05-15 PROBLEM — N50.89 TESTICULAR MASS: Status: ACTIVE | Noted: 2023-05-15

## 2023-05-15 NOTE — TELEPHONE ENCOUNTER
Discussed with Dr. Lima who is advising ED now if active symptoms. Per Dr. Lima, hematology does not admit porphyria patients; Internal medicine/ hospital medicine handles admit for porphyria patients if appropriate per Dr. Lima.

## 2023-05-15 NOTE — TELEPHONE ENCOUNTER
0917: Discussed portal messages and pt chart with Dr. Donita Lima who is advising ED now for further evaluation of symptoms.

## 2023-05-16 ENCOUNTER — PATIENT MESSAGE (OUTPATIENT)
Dept: NEUROLOGY | Facility: CLINIC | Age: 48
End: 2023-05-16
Payer: COMMERCIAL

## 2023-05-16 DIAGNOSIS — E80.20: Primary | ICD-10-CM

## 2023-05-16 LAB
CLINICAL BIOCHEMIST REVIEW: NORMAL
PBG SYNTHASE BLD-CCNC: 6.3 NMOL/L/SEC
PROVIDER SIGNING NAME: NORMAL

## 2023-05-16 NOTE — TELEPHONE ENCOUNTER
----- Message from Mireya Richards sent at 5/16/2023 12:17 PM CDT -----  Contact: Spouse/Sandy [phone]  2nd Request    Patient is requesting a referral to Hem & Onc.  Requesting a call asap      Please call and advise.    Thank You       Postop Diagnosis: same

## 2023-05-17 ENCOUNTER — TELEPHONE (OUTPATIENT)
Dept: NEUROLOGY | Facility: CLINIC | Age: 48
End: 2023-05-17
Payer: COMMERCIAL

## 2023-05-17 LAB
CLINICAL BIOCHEMIST REVIEW: NORMAL
PORPHYRINS REVIEWED BY: NORMAL
PORPHYRINS SERPL-MCNC: <1 MCG/DL

## 2023-05-19 LAB
CLINICAL BIOCHEMIST REVIEW: NORMAL
PBG DEAMINASE RBC-CCNC: 9.6 NMOL/L/SEC

## 2023-05-22 ENCOUNTER — PATIENT MESSAGE (OUTPATIENT)
Dept: HEMATOLOGY/ONCOLOGY | Facility: CLINIC | Age: 48
End: 2023-05-22

## 2023-05-22 ENCOUNTER — OFFICE VISIT (OUTPATIENT)
Dept: HEMATOLOGY/ONCOLOGY | Facility: CLINIC | Age: 48
End: 2023-05-22
Payer: COMMERCIAL

## 2023-05-22 VITALS
TEMPERATURE: 98 F | BODY MASS INDEX: 24.71 KG/M2 | RESPIRATION RATE: 16 BRPM | WEIGHT: 172.63 LBS | SYSTOLIC BLOOD PRESSURE: 128 MMHG | HEART RATE: 85 BPM | DIASTOLIC BLOOD PRESSURE: 86 MMHG | OXYGEN SATURATION: 97 % | HEIGHT: 70 IN

## 2023-05-22 DIAGNOSIS — E53.8 B12 DEFICIENCY DUE TO DIET: Primary | ICD-10-CM

## 2023-05-22 PROCEDURE — 99215 PR OFFICE/OUTPT VISIT, EST, LEVL V, 40-54 MIN: ICD-10-PCS | Mod: S$GLB,,, | Performed by: INTERNAL MEDICINE

## 2023-05-22 PROCEDURE — 1159F PR MEDICATION LIST DOCUMENTED IN MEDICAL RECORD: ICD-10-PCS | Mod: CPTII,S$GLB,, | Performed by: INTERNAL MEDICINE

## 2023-05-22 PROCEDURE — 3074F SYST BP LT 130 MM HG: CPT | Mod: CPTII,S$GLB,, | Performed by: INTERNAL MEDICINE

## 2023-05-22 PROCEDURE — 99999 PR PBB SHADOW E&M-EST. PATIENT-LVL III: CPT | Mod: PBBFAC,,, | Performed by: INTERNAL MEDICINE

## 2023-05-22 PROCEDURE — 1159F MED LIST DOCD IN RCRD: CPT | Mod: CPTII,S$GLB,, | Performed by: INTERNAL MEDICINE

## 2023-05-22 PROCEDURE — 99999 PR PBB SHADOW E&M-EST. PATIENT-LVL III: ICD-10-PCS | Mod: PBBFAC,,, | Performed by: INTERNAL MEDICINE

## 2023-05-22 PROCEDURE — 3074F PR MOST RECENT SYSTOLIC BLOOD PRESSURE < 130 MM HG: ICD-10-PCS | Mod: CPTII,S$GLB,, | Performed by: INTERNAL MEDICINE

## 2023-05-22 PROCEDURE — 3008F BODY MASS INDEX DOCD: CPT | Mod: CPTII,S$GLB,, | Performed by: INTERNAL MEDICINE

## 2023-05-22 PROCEDURE — 3079F DIAST BP 80-89 MM HG: CPT | Mod: CPTII,S$GLB,, | Performed by: INTERNAL MEDICINE

## 2023-05-22 PROCEDURE — 99215 OFFICE O/P EST HI 40 MIN: CPT | Mod: S$GLB,,, | Performed by: INTERNAL MEDICINE

## 2023-05-22 PROCEDURE — 3079F PR MOST RECENT DIASTOLIC BLOOD PRESSURE 80-89 MM HG: ICD-10-PCS | Mod: CPTII,S$GLB,, | Performed by: INTERNAL MEDICINE

## 2023-05-22 PROCEDURE — 3008F PR BODY MASS INDEX (BMI) DOCUMENTED: ICD-10-PCS | Mod: CPTII,S$GLB,, | Performed by: INTERNAL MEDICINE

## 2023-05-22 NOTE — PROGRESS NOTES
Hematology and Medical Oncology   Follow Up     05/22/2023    Reason For Referral: Question of porphyria     History of Present Ilness:   Luis Daniel Wynne Jr. (Luis Daniel) is a pleasant 47 y.o.male who presents in person with his wife by his side.    At last appointment he felt like his energy was about 60%. Put on weight the last few days. Feels like vision is now blurry. Vision problems use to come and go, but now nothing makes it better.     Currently eating well, with no restrictions or limitations. When he has an attack has no appetite, things taste and smell bad. During these times he has difficulty remaining hydrated.    Last episode duration was 3-4 weeks. Previously episodes would last 3-4 days and there would be about 4 weeks between episodes.     During the attacks he experiences no pain. He is not having bowel movements. Making very little urine.     The attacks have been happening for about 5 years.     Suddenly lactose intolerant.     Experiencing memory loss - [place].    Feels like attacks happen primarily at night.     Gets sweats, diarrhea, blurry vision during attacks.     GI suggests that this could be porphyria. Initial labs drawn.     Interval History:  Last attack started May 11/12. Prior to the attack has fatigue, insomnia. After the attacks he gorge eats, felt like he'll have free fluid. Feels like the attacks stopped on Tuesday 5/16. Then on Wednesday he felt fine and able to paint the house.  During this attack stool turned darn and dense sank to the bottom right away.     Monitoring vital signs at home. SBP elevated to 160/170.       Plan to admit to EMU if he has another attack.     PAST MEDICAL HISTORY:   Past Medical History:   Diagnosis Date    Depression     Fibromyalgia     Marijuana use     Peptic ulcer     Scoliosis        PAST SURGICAL HISTORY:   Past Surgical History:   Procedure Laterality Date    COLONOSCOPY N/A 4/5/2023    Procedure: COLONOSCOPY sutab;   Surgeon: Angelo Salgado MD;  Location: [facility];  Service: Endoscopy;  Laterality: N/A;    ESOPHAGOGASTRODUODENOSCOPY N/A 4/5/2023    Procedure: EGD (ESOPHAGOGASTRODUODENOSCOPY);  Surgeon: Angelo Salgado MD;  Location: [facility];  Service: Endoscopy;  Laterality: N/A;    KNEE ARTHROSCOPY         PAST SOCIAL HISTORY:   reports that he quit smoking about 6 years ago. His smoking use included cigarettes. He smoked an average of .5 packs per day. He uses smokeless tobacco. He reports that he does not currently use alcohol. He reports current drug use. Drug: Marijuana.    FAMILY HISTORY:  Family History   Problem Relation Age of Onset    No Known Problems Mother     No Known Problems Father     Heart disease Neg Hx     Hypertension Neg Hx        CURRENT MEDICATIONS:   Current Outpatient Medications   Medication Sig    venlafaxine (EFFEXOR-XR) 150 MG Cp24 Take 300 mg by mouth once daily.    cyanocobalamin-cobamamide 5,000-100 mcg Subl Place 5,000 mcg under the tongue Daily. Patient states taking 1/2 tab.    dextroamphetamine-amphetamine (ADDERALL XR) 20 MG 24 hr capsule Patient states has not been taking since started with seizures.    ibuprofen (ADVIL,MOTRIN) 800 MG tablet Take 1 tablet (800 mg total) by mouth 3 (three) times daily as needed for Pain. (Patient not taking: Reported on 5/22/2023)    lacosamide (VIMPAT) 200 mg Tab tablet Take 1 tablet (200 mg total) by mouth every 12 (twelve) hours. (Patient not taking: Reported on 5/22/2023)    levETIRAcetam (KEPPRA) 500 MG Tab Take 1 tablet (500 mg total) by mouth 2 (two) times daily. (Patient not taking: Reported on 5/22/2023)    nicotine polacrilex 4 MG Lozg Take 4 mg by mouth as needed.    testosterone (ANDROGEL) 20.25 mg/1.25 gram (1.62 %) GlPm Apply 2 pumps to shoulders daily (Patient not taking: Reported on 5/22/2023)     No current facility-administered medications for this visit.     ALLERGIES:   Review of patient's allergies indicates:  No Known  Allergies      Review of Systems:     Review of Systems   Constitutional:  Positive for appetite change and unexpected weight change.   HENT:   Positive for trouble swallowing. Negative for mouth sores.    Eyes:  Positive for eye problems.   Respiratory:  Negative for cough and shortness of breath.    Cardiovascular:  Negative for chest pain.   Gastrointestinal:  Positive for diarrhea and nausea.   Genitourinary:  Positive for frequency.    Musculoskeletal:  Positive for back pain.   Skin:  Negative for rash.   Neurological:  Positive for dizziness. Negative for headaches.   Hematological:  Negative for adenopathy.   Psychiatric/Behavioral:  Positive for sleep disturbance. The patient is nervous/anxious.         Physical Exam:     Vitals:    05/22/23 1434   BP: 128/86   Pulse: 85   Resp: 16   Temp: 98.3 °F (36.8 °C)     Physical Exam  Constitutional:       General: He is not in acute distress.     Appearance: He is well-developed. He is not diaphoretic.   HENT:      Head: Normocephalic and atraumatic.      Mouth/Throat:      Pharynx: No oropharyngeal exudate.   Eyes:      Conjunctiva/sclera: Conjunctivae normal.      Pupils: Pupils are equal, round, and reactive to light.   Neck:      Thyroid: No thyromegaly.      Vascular: No JVD.      Trachea: No tracheal deviation.   Cardiovascular:      Rate and Rhythm: Normal rate and regular rhythm.      Heart sounds: Normal heart sounds. No murmur heard.    No friction rub.   Pulmonary:      Effort: Pulmonary effort is normal. No respiratory distress.      Breath sounds: Normal breath sounds. No stridor. No wheezing or rales.   Chest:      Chest wall: No tenderness.   Abdominal:      General: Bowel sounds are normal. There is no distension.      Palpations: Abdomen is soft.      Tenderness: There is no abdominal tenderness. There is no guarding or rebound.   Musculoskeletal:         General: No tenderness or deformity. Normal range of motion.      Cervical back: Normal range  of motion and neck supple.   Skin:     General: Skin is warm and dry.      Capillary Refill: Capillary refill takes less than 2 seconds.      Coloration: Skin is not pale.      Findings: No erythema or rash.   Neurological:      Mental Status: He is alert and oriented to person, place, and time.      Cranial Nerves: No cranial nerve deficit.      Sensory: No sensory deficit.      Motor: No abnormal muscle tone.      Coordination: Coordination normal.      Deep Tendon Reflexes: Reflexes normal.   Psychiatric:         Behavior: Behavior normal.         Thought Content: Thought content normal.         Judgment: Judgment normal.         ECOG Performance Status: (foot note - ECOG PS provided by Eastern Cooperative Oncology Group) 2 - Symptomatic, <50% confined to bed    Karnofsky Performance Score:  80%- Normal Activity with Effort: Some Symptoms of Disease    Labs:   Lab Results   Component Value Date    WBC 12.38 05/13/2023    HGB 16.4 05/13/2023    HCT 48.4 05/13/2023     05/13/2023    CHOL 153 06/15/2022    TRIG 182 (H) 06/15/2022    HDL 37 (L) 06/15/2022    ALT 28 05/13/2023    AST 20 05/13/2023     05/13/2023    K 3.9 05/13/2023     05/13/2023    CREATININE 1.0 05/13/2023    BUN 11 05/13/2023    CO2 26 05/13/2023    TSH 1.960 04/03/2023    HGBA1C 5.0 12/01/2011     Tests from 4/3/23:  Uroporphyrin: 45 [elevated]  Pophobilinogen: 1.1    In this sample, the excretion of uroporphyrin was elevated. This finding is likely related to increased stimulation of the heme-forming system by a stressor, such as ethanol, a   variety of medications (sulfonamides, aminopyrine or diallyl barbiturate), a physiologic steroid or exposure to arsenic.     5/2/23  Uroporphyhyrin: 16  In this sample, the porphyrins profile was normal.     Total Porphyrins: Total plasma porphyrins are normal, fractionation not   performed.       Labs from 5/13/23:  PBG Deaminase: 9.6  In this sample, the erythrocyte porphobilinogen  deaminase activity was normal.      Imaging: Previous imaging has been reviewed    Assessment and Plan:     Mr. Wynne is a pleasant 47 year old with a question of porphyria.    Uroporphyrinuria  --mildly elevated in a single read, repeat urine study is within normal limits  --porphyrin plasma level pending  --some symptoms patient is experiencing are consistent with porphyria diagnosis  --Most recent labs 5/2 and 5/13 are normal values  --Repeat lab orders placed for imminent attack and easier collection of labs/urine  --At this time no indication for hemin therapy  --Will fill out porphyria society gene mutation testing request at the patients request   --Will place referral to [place] or another tertiary referral center like [facility]      30 minutes were spent face to face with the patient and his family to discuss the disease, natural history, and treatment options. I have provided the patient with an opportunity to ask questions and have all questions answered to his satisfaction.       he will return to clinic as needed as testing results, but knows to call in the interim if symptoms change or should a problem arise.        Irais Palomo MD  Hematology and Medical Oncology  [facility]  [facility]      BMT Chart Routing      Follow up with physician Other. PRN referred out to ancillary center   Follow up with [facility]    Provider visit type    Infusion scheduling note    Injection scheduling note    Labs    Imaging    Pharmacy appointment    Other referrals

## 2023-05-23 ENCOUNTER — PATIENT MESSAGE (OUTPATIENT)
Dept: NEUROLOGY | Facility: CLINIC | Age: 48
End: 2023-05-23
Payer: COMMERCIAL

## 2023-05-24 ENCOUNTER — TELEPHONE (OUTPATIENT)
Dept: NEUROLOGY | Facility: CLINIC | Age: 48
End: 2023-05-24
Payer: COMMERCIAL

## 2023-05-24 NOTE — TELEPHONE ENCOUNTER
Spoke with patient and he confirmed he will be coming in for his EMU admission, not to cancel it. He has my direct line if he needs to contact me.

## 2023-05-24 NOTE — TELEPHONE ENCOUNTER
Called patient - he reports that he only gets his seizures during an attack.  His symptoms during his attacks are variable and there is a question of whether he may have porphyria.  He currently is not having an attack or seizure like events.  Explained that we should keep EMU admission as scheduled and we would only admit through the ER in an emergent situation (like the other week when he was having multiple events and went to the ER twice, however this has since resolved).  Patient reports that he is no longer taking AEDs because of concern they could worsen porphyria, additionally he does not think he is having epileptic seizures (which I agree with).  However, his prior EEGs have been abnormal with focal slowing and epileptiform activity, so I still advise EMU admission.  Patient voiced understanding.

## 2023-05-24 NOTE — TELEPHONE ENCOUNTER
----- Message from Liz Huang RN sent at 5/24/2023  8:35 AM CDT -----  Hey Dr. FAIRBANKS!    This patient is wanting to cancel his EMU admission- he already has an IP approval w/ [employer] via the P2P you performed. Canceling this admission will result in not getting another approval when rescheduling. He says he wants to cancel b/c you told him he can come through the ED to be admitted to the EMU whenever he has another event. Getting an EMU bed is not a guarantee when admitting through the ED, which I tried explaining in the message I sent. Did you want to call him maybe and explain this? I'd call but if you feel he needs to keep his current admission it would be best coming from his neurologist.... or I can cancel it if you feel there's no point in trying to rationalize. Let me know! Thx!

## 2023-05-31 ENCOUNTER — PATIENT MESSAGE (OUTPATIENT)
Dept: HEMATOLOGY/ONCOLOGY | Facility: CLINIC | Age: 48
End: 2023-05-31
Payer: COMMERCIAL

## 2023-06-01 ENCOUNTER — NURSE TRIAGE (OUTPATIENT)
Dept: ADMINISTRATIVE | Facility: CLINIC | Age: 48
End: 2023-06-01
Payer: COMMERCIAL

## 2023-06-01 ENCOUNTER — PATIENT MESSAGE (OUTPATIENT)
Dept: NEUROLOGY | Facility: CLINIC | Age: 48
End: 2023-06-01
Payer: COMMERCIAL

## 2023-06-01 NOTE — TELEPHONE ENCOUNTER
Reason for Disposition   [1] Two or more seizures AND [2] stays confused between seizures    Additional Information   Negative: First seizure ever   Negative: [1] Seizure AND [2] lasts > 5 minutes    Protocols used: [id number]  Spoke with wife of pt who states pt had 3 seizures today states pt has been confused between seizures. Advised to call 911. States that it was told to her that pt would be admitted to EMU if brought to ED. States she would like pt to be monitored.      Spoke with Dr. Jones who states that pt wont be admitted to EMU. Provider agrees that pt should be seen and evaluated in ED.       Informed that wife, verbalized understanding

## 2023-06-02 ENCOUNTER — HOSPITAL ENCOUNTER (OUTPATIENT)
Facility: HOSPITAL | Age: 48
Discharge: HOME | End: 2023-06-04
Attending: EMERGENCY MEDICINE | Admitting: INTERNAL MEDICINE
Payer: COMMERCIAL

## 2023-06-02 ENCOUNTER — TELEPHONE (OUTPATIENT)
Dept: NEUROLOGY | Facility: CLINIC | Age: 48
End: 2023-06-02
Payer: COMMERCIAL

## 2023-06-02 DIAGNOSIS — R07.9 CHEST PAIN: ICD-10-CM

## 2023-06-02 DIAGNOSIS — R56.9 SEIZURE-LIKE ACTIVITY: ICD-10-CM

## 2023-06-02 DIAGNOSIS — R56.9 SEIZURE: ICD-10-CM

## 2023-06-02 PROBLEM — Z72.0 TOBACCO USE: Status: ACTIVE | Noted: 2023-06-02

## 2023-06-02 LAB
ALBUMIN SERPL BCP-MCNC: 4.5 G/DL (ref 3.5–5.2)
ALP SERPL-CCNC: 80 U/L (ref 55–135)
ALT SERPL W/O P-5'-P-CCNC: 41 U/L (ref 10–44)
AMPHET+METHAMPHET UR QL: NEGATIVE
ANION GAP SERPL CALC-SCNC: 13 MMOL/L (ref 8–16)
AST SERPL-CCNC: 21 U/L (ref 10–40)
BARBITURATES UR QL SCN>200 NG/ML: NEGATIVE
BASOPHILS # BLD AUTO: 0.05 K/UL (ref 0–0.2)
BASOPHILS NFR BLD: 0.4 % (ref 0–1.9)
BENZODIAZ UR QL SCN>200 NG/ML: NEGATIVE
BILIRUB SERPL-MCNC: 1.4 MG/DL (ref 0.1–1)
BILIRUB UR QL STRIP: NEGATIVE
BUN SERPL-MCNC: 12 MG/DL (ref 6–20)
BUN SERPL-MCNC: 13 MG/DL (ref 6–30)
BZE UR QL SCN: NEGATIVE
CALCIUM SERPL-MCNC: 9.8 MG/DL (ref 8.7–10.5)
CANNABINOIDS UR QL SCN: ABNORMAL
CHLORIDE SERPL-SCNC: 102 MMOL/L (ref 95–110)
CHLORIDE SERPL-SCNC: 99 MMOL/L (ref 95–110)
CK SERPL-CCNC: 65 U/L (ref 20–200)
CLARITY UR REFRACT.AUTO: CLEAR
CO2 SERPL-SCNC: 26 MMOL/L (ref 23–29)
COLOR UR AUTO: YELLOW
CREAT SERPL-MCNC: 1 MG/DL (ref 0.5–1.4)
CREAT SERPL-MCNC: 1.1 MG/DL (ref 0.5–1.4)
CREAT UR-MCNC: 157 MG/DL (ref 23–375)
CRP SERPL-MCNC: 2.3 MG/L (ref 0–8.2)
DIFFERENTIAL METHOD: ABNORMAL
EOSINOPHIL # BLD AUTO: 0.1 K/UL (ref 0–0.5)
EOSINOPHIL NFR BLD: 0.8 % (ref 0–8)
ERYTHROCYTE [DISTWIDTH] IN BLOOD BY AUTOMATED COUNT: 12.6 % (ref 11.5–14.5)
ERYTHROCYTE [SEDIMENTATION RATE] IN BLOOD BY PHOTOMETRIC METHOD: 6 MM/HR (ref 0–23)
EST. GFR  (NO RACE VARIABLE): >60 ML/MIN/1.73 M^2
ETHANOL SERPL-MCNC: <10 MG/DL
ETHANOL UR-MCNC: <10 MG/DL
GLUCOSE SERPL-MCNC: 102 MG/DL (ref 70–110)
GLUCOSE SERPL-MCNC: 106 MG/DL (ref 70–110)
GLUCOSE UR QL STRIP: NEGATIVE
HCT VFR BLD AUTO: 47.6 % (ref 40–54)
HCT VFR BLD CALC: 48 %PCV (ref 36–54)
HGB BLD-MCNC: 16 G/DL (ref 14–18)
HGB UR QL STRIP: ABNORMAL
IMM GRANULOCYTES # BLD AUTO: 0.06 K/UL (ref 0–0.04)
IMM GRANULOCYTES NFR BLD AUTO: 0.4 % (ref 0–0.5)
KETONES UR QL STRIP: ABNORMAL
LEUKOCYTE ESTERASE UR QL STRIP: NEGATIVE
LYMPHOCYTES # BLD AUTO: 1.3 K/UL (ref 1–4.8)
LYMPHOCYTES NFR BLD: 9.7 % (ref 18–48)
MCH RBC QN AUTO: 31.7 PG (ref 27–31)
MCHC RBC AUTO-ENTMCNC: 33.6 G/DL (ref 32–36)
MCV RBC AUTO: 94 FL (ref 82–98)
METHADONE UR QL SCN>300 NG/ML: NEGATIVE
MONOCYTES # BLD AUTO: 0.7 K/UL (ref 0.3–1)
MONOCYTES NFR BLD: 4.8 % (ref 4–15)
NEUTROPHILS # BLD AUTO: 11.5 K/UL (ref 1.8–7.7)
NEUTROPHILS NFR BLD: 83.9 % (ref 38–73)
NITRITE UR QL STRIP: NEGATIVE
NRBC BLD-RTO: 0 /100 WBC
OPIATES UR QL SCN: NEGATIVE
PCP UR QL SCN>25 NG/ML: NEGATIVE
PH UR STRIP: 6 [PH] (ref 5–8)
PLATELET # BLD AUTO: 270 K/UL (ref 150–450)
PMV BLD AUTO: 10.4 FL (ref 9.2–12.9)
POC IONIZED CALCIUM: 1.24 MMOL/L (ref 1.06–1.42)
POC TCO2 (MEASURED): 28 MMOL/L (ref 23–29)
POTASSIUM BLD-SCNC: 3.9 MMOL/L (ref 3.5–5.1)
POTASSIUM SERPL-SCNC: 4.1 MMOL/L (ref 3.5–5.1)
PROT SERPL-MCNC: 8 G/DL (ref 6–8.4)
PROT UR QL STRIP: NEGATIVE
RBC # BLD AUTO: 5.05 M/UL (ref 4.6–6.2)
SAMPLE: NORMAL
SARS-COV-2 RDRP RESP QL NAA+PROBE: NEGATIVE
SODIUM BLD-SCNC: 142 MMOL/L (ref 136–145)
SODIUM SERPL-SCNC: 141 MMOL/L (ref 136–145)
SP GR UR STRIP: 1.01 (ref 1–1.03)
TOXICOLOGY INFORMATION: ABNORMAL
TSH SERPL DL<=0.005 MIU/L-ACNC: 1.5 UIU/ML (ref 0.4–4)
URN SPEC COLLECT METH UR: ABNORMAL
WBC # BLD AUTO: 13.66 K/UL (ref 3.9–12.7)

## 2023-06-02 PROCEDURE — 82550 ASSAY OF CK (CPK): CPT | Performed by: INTERNAL MEDICINE

## 2023-06-02 PROCEDURE — 80053 COMPREHEN METABOLIC PANEL: CPT | Performed by: INTERNAL MEDICINE

## 2023-06-02 PROCEDURE — 99285 EMERGENCY DEPT VISIT HI MDM: CPT | Mod: GC,,, | Performed by: EMERGENCY MEDICINE

## 2023-06-02 PROCEDURE — 86140 C-REACTIVE PROTEIN: CPT | Performed by: INTERNAL MEDICINE

## 2023-06-02 PROCEDURE — 80177 DRUG SCRN QUAN LEVETIRACETAM: CPT | Performed by: INTERNAL MEDICINE

## 2023-06-02 PROCEDURE — 93010 EKG 12-LEAD: ICD-10-PCS | Mod: ,,, | Performed by: INTERNAL MEDICINE

## 2023-06-02 PROCEDURE — 96360 HYDRATION IV INFUSION INIT: CPT

## 2023-06-02 PROCEDURE — 25000003 PHARM REV CODE 250

## 2023-06-02 PROCEDURE — G0378 HOSPITAL OBSERVATION PER HR: HCPCS

## 2023-06-02 PROCEDURE — 63600175 PHARM REV CODE 636 W HCPCS: Performed by: EMERGENCY MEDICINE

## 2023-06-02 PROCEDURE — 96361 HYDRATE IV INFUSION ADD-ON: CPT

## 2023-06-02 PROCEDURE — 82962 GLUCOSE BLOOD TEST: CPT

## 2023-06-02 PROCEDURE — 82077 ASSAY SPEC XCP UR&BREATH IA: CPT | Performed by: INTERNAL MEDICINE

## 2023-06-02 PROCEDURE — 80235 DRUG ASSAY LACOSAMIDE: CPT | Performed by: INTERNAL MEDICINE

## 2023-06-02 PROCEDURE — 82565 ASSAY OF CREATININE: CPT

## 2023-06-02 PROCEDURE — 80307 DRUG TEST PRSMV CHEM ANLYZR: CPT | Performed by: INTERNAL MEDICINE

## 2023-06-02 PROCEDURE — 84443 ASSAY THYROID STIM HORMONE: CPT | Performed by: INTERNAL MEDICINE

## 2023-06-02 PROCEDURE — 99285 EMERGENCY DEPT VISIT HI MDM: CPT | Mod: 25

## 2023-06-02 PROCEDURE — 85025 COMPLETE CBC W/AUTO DIFF WBC: CPT | Performed by: INTERNAL MEDICINE

## 2023-06-02 PROCEDURE — U0002 COVID-19 LAB TEST NON-CDC: HCPCS | Performed by: EMERGENCY MEDICINE

## 2023-06-02 PROCEDURE — 80047 BASIC METABLC PNL IONIZED CA: CPT

## 2023-06-02 PROCEDURE — 99285 PR EMERGENCY DEPT VISIT,LEVEL V: ICD-10-PCS | Mod: GC,,, | Performed by: EMERGENCY MEDICINE

## 2023-06-02 PROCEDURE — 93010 ELECTROCARDIOGRAM REPORT: CPT | Mod: ,,, | Performed by: INTERNAL MEDICINE

## 2023-06-02 PROCEDURE — 85652 RBC SED RATE AUTOMATED: CPT | Performed by: INTERNAL MEDICINE

## 2023-06-02 PROCEDURE — 82330 ASSAY OF CALCIUM: CPT

## 2023-06-02 PROCEDURE — 93005 ELECTROCARDIOGRAM TRACING: CPT

## 2023-06-02 PROCEDURE — 81003 URINALYSIS AUTO W/O SCOPE: CPT | Mod: 59 | Performed by: INTERNAL MEDICINE

## 2023-06-02 RX ORDER — LORAZEPAM 2 MG/ML
4 INJECTION INTRAMUSCULAR EVERY 10 MIN PRN
Status: DISCONTINUED | OUTPATIENT
Start: 2023-06-02 | End: 2023-06-04 | Stop reason: HOSPADM

## 2023-06-02 RX ORDER — GLUCAGON 1 MG
1 KIT INJECTION
Status: DISCONTINUED | OUTPATIENT
Start: 2023-06-02 | End: 2023-06-04 | Stop reason: HOSPADM

## 2023-06-02 RX ORDER — FOLIC ACID 1 MG/1
1 TABLET ORAL DAILY
Status: DISCONTINUED | OUTPATIENT
Start: 2023-06-02 | End: 2023-06-04 | Stop reason: HOSPADM

## 2023-06-02 RX ORDER — DEXTROSE 40 %
15 GEL (GRAM) ORAL
Status: DISCONTINUED | OUTPATIENT
Start: 2023-06-02 | End: 2023-06-04 | Stop reason: HOSPADM

## 2023-06-02 RX ORDER — SODIUM CHLORIDE 0.9 % (FLUSH) 0.9 %
10 SYRINGE (ML) INJECTION EVERY 12 HOURS PRN
Status: DISCONTINUED | OUTPATIENT
Start: 2023-06-02 | End: 2023-06-04 | Stop reason: HOSPADM

## 2023-06-02 RX ORDER — CYANOCOBALAMIN (VITAMIN B-12) 250 MCG
500 TABLET ORAL DAILY
Status: DISCONTINUED | OUTPATIENT
Start: 2023-06-02 | End: 2023-06-04 | Stop reason: HOSPADM

## 2023-06-02 RX ORDER — SIMETHICONE 80 MG
1 TABLET,CHEWABLE ORAL 3 TIMES DAILY PRN
Status: DISCONTINUED | OUTPATIENT
Start: 2023-06-02 | End: 2023-06-04 | Stop reason: HOSPADM

## 2023-06-02 RX ORDER — TALC
6 POWDER (GRAM) TOPICAL NIGHTLY PRN
Status: DISCONTINUED | OUTPATIENT
Start: 2023-06-02 | End: 2023-06-04 | Stop reason: HOSPADM

## 2023-06-02 RX ORDER — DEXTROSE 40 %
30 GEL (GRAM) ORAL
Status: DISCONTINUED | OUTPATIENT
Start: 2023-06-02 | End: 2023-06-04 | Stop reason: HOSPADM

## 2023-06-02 RX ORDER — NALOXONE HCL 0.4 MG/ML
0.02 VIAL (ML) INJECTION
Status: DISCONTINUED | OUTPATIENT
Start: 2023-06-02 | End: 2023-06-04 | Stop reason: HOSPADM

## 2023-06-02 RX ORDER — ACETAMINOPHEN 325 MG/1
650 TABLET ORAL EVERY 8 HOURS PRN
Status: DISCONTINUED | OUTPATIENT
Start: 2023-06-02 | End: 2023-06-04 | Stop reason: HOSPADM

## 2023-06-02 RX ADMIN — SODIUM CHLORIDE, POTASSIUM CHLORIDE, SODIUM LACTATE AND CALCIUM CHLORIDE 1000 ML: 600; 310; 30; 20 INJECTION, SOLUTION INTRAVENOUS at 04:06

## 2023-06-02 RX ADMIN — CYANOCOBALAMIN TAB 250 MCG 500 MCG: 250 TAB at 10:06

## 2023-06-02 RX ADMIN — FOLIC ACID 1 MG: 1 TABLET ORAL at 10:06

## 2023-06-02 NOTE — HPI
"47 y.o. M with PMHx of depression, fibromyalgia, ADHD, PUD, BPH, and seizures presenting to the ED for seizure-like activity. History provided by patient and wife. They state that he has had an increase in seizure-like episodes since yesterday. Has been seen by epilepsy and was planned for EMU admission on 6/5 for seizure observation. Due to the increase in his seizure-like activity his epileptologist recommended he come to the ED today. Patient reports he has "attacks" that occur approximately every 28 days. During the attacks, he experiences hallucinations, paralysis, convulsions, fatigue, brain fog, insomnia, memory loss, blurred vision, altered sense of smell and taste, decreased appetite, and seizures. Has been seen by providers in multiple specialities for these symptoms. Was started on vimpat in 2022 which was uptitrated without much improvement. In November 2022 was seen by epilepsy and keppra was added. Patient has stopped taking these medications as he feels like they were not helping. Most recently, there has been concern for a diagnosis of porphyria based on an elevated urine uroporphyrin. Has referral to hematology at [facility] to further discuss. Hematology was consulted in the ED and stated that the patient does not meet criteria for diagnosis of porphyria.    In the ED, patient was afebrile, HDS on room air. Labs notable for leukocytosis to 13.6, elevated bilirubin 1.4, UDS + for THC, UA w/ 1+ ketones, trace occult blood. CXR wnl. CT head no acute abnormality. No seizure activity observed while in ED. Patient admitted to  for further management.      "
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
## 2023-06-02 NOTE — ED TRIAGE NOTES
""My mind is all -." States he has been having seizures, hallucinations, convulsions. Neurologist told patient to come in today- due to check in to EMU on Monday.     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   "

## 2023-06-02 NOTE — TELEPHONE ENCOUNTER
Called patient's wife back.  Luis Daniel had 5 seizure like episodes yesterday back to back and another one overnight.  He was also experiencing symptoms of his typical attack - hot and cold flashes, sweating, insomnia, intermittent paralysis.  He was also having hallucinations, which did happen with one other attack in the past.  Wife is highly concerned that patient cannot wait until EMU admission on Monday.  I agree that patient needs sooner evaluation.  As patient is being worked up for porphyria, it may be helpful to do further testing for this during an acute attack.      Wife will take patient to ER around noon today.  Recommend that patient be admitted to floor with general neurology consult and cEEG monitoring for spell characterization.

## 2023-06-02 NOTE — TELEPHONE ENCOUNTER
Received a voice mail from patient's spouse ands she reports he is having a lot of seizures, wants to bring him to the ED. He has an EMU admission scheduled for Monday, 6/5/23, but feels he needs to be in the hospital sooner. I asked her to please speak with Luis Daniel and let us know today if they are coming to the ED so Dr. Parsii can alert the ED and hospital team who will be admitting him. I further explained that there may not be an EMU bed available until Monday, but he would still be admitted with continuous EEG and moved into an EMU room once one becomes available. V/U.

## 2023-06-02 NOTE — CONSULTS
Patient is a 47 year old male who presents to hospital at the instruction of neurology to be admitted to EMU. Hematology consulted out of concern for porphyria. Per patient's primary hematologists last note;    Hematology history:  At last appointment he felt like his energy was about 60%. Put on weight the last few days. Feels like vision is now blurry. Vision problems use to come and go, but now nothing makes it better.      Currently eating well, with no restrictions or limitations. When he has an attack has no appetite, things taste and smell bad. During these times he has difficulty remaining hydrated.     Last episode duration was 3-4 weeks. Previously episodes would last 3-4 days and there would be about 4 weeks between episodes.      During the attacks he experiences no pain. He is not having bowel movements. Making very little urine.      The attacks have been happening for about 5 years.      Suddenly lactose intolerant.      Experiencing memory loss - [place].     Feels like attacks happen primarily at night.      Gets sweats, diarrhea, blurry vision during attacks.      GI suggests that this could be porphyria. Initial labs drawn.     Chart reviewed, porphyrins urine wnl on 5/10. Porphyrins total plasma on 5/13, Total plasma porphyrins are normal, fractionation not   performed. All additional labs are negative as well    Patient does not meet criteria for porphyria diagnosis. Remainder of care per neurology.     * Hematology will sign off, please contact us with any further questions or concerns

## 2023-06-03 LAB
ALBUMIN SERPL BCP-MCNC: 3.9 G/DL (ref 3.5–5.2)
ALP SERPL-CCNC: 71 U/L (ref 55–135)
ALT SERPL W/O P-5'-P-CCNC: 34 U/L (ref 10–44)
ANION GAP SERPL CALC-SCNC: 9 MMOL/L (ref 8–16)
AST SERPL-CCNC: 19 U/L (ref 10–40)
BASOPHILS # BLD AUTO: 0.03 K/UL (ref 0–0.2)
BASOPHILS NFR BLD: 0.3 % (ref 0–1.9)
BILIRUB SERPL-MCNC: 1.5 MG/DL (ref 0.1–1)
BUN SERPL-MCNC: 12 MG/DL (ref 6–20)
CALCIUM SERPL-MCNC: 9.7 MG/DL (ref 8.7–10.5)
CHLORIDE SERPL-SCNC: 104 MMOL/L (ref 95–110)
CO2 SERPL-SCNC: 26 MMOL/L (ref 23–29)
CREAT SERPL-MCNC: 1 MG/DL (ref 0.5–1.4)
DIFFERENTIAL METHOD: ABNORMAL
EOSINOPHIL # BLD AUTO: 0.2 K/UL (ref 0–0.5)
EOSINOPHIL NFR BLD: 2 % (ref 0–8)
ERYTHROCYTE [DISTWIDTH] IN BLOOD BY AUTOMATED COUNT: 12.7 % (ref 11.5–14.5)
EST. GFR  (NO RACE VARIABLE): >60 ML/MIN/1.73 M^2
GLUCOSE SERPL-MCNC: 96 MG/DL (ref 70–110)
HCT VFR BLD AUTO: 44 % (ref 40–54)
HGB BLD-MCNC: 14.5 G/DL (ref 14–18)
IMM GRANULOCYTES # BLD AUTO: 0.03 K/UL (ref 0–0.04)
IMM GRANULOCYTES NFR BLD AUTO: 0.3 % (ref 0–0.5)
LYMPHOCYTES # BLD AUTO: 1.9 K/UL (ref 1–4.8)
LYMPHOCYTES NFR BLD: 16.9 % (ref 18–48)
MAGNESIUM SERPL-MCNC: 2.1 MG/DL (ref 1.6–2.6)
MCH RBC QN AUTO: 31 PG (ref 27–31)
MCHC RBC AUTO-ENTMCNC: 33 G/DL (ref 32–36)
MCV RBC AUTO: 94 FL (ref 82–98)
MONOCYTES # BLD AUTO: 0.9 K/UL (ref 0.3–1)
MONOCYTES NFR BLD: 8.2 % (ref 4–15)
NEUTROPHILS # BLD AUTO: 8 K/UL (ref 1.8–7.7)
NEUTROPHILS NFR BLD: 72.3 % (ref 38–73)
NRBC BLD-RTO: 0 /100 WBC
PHOSPHATE SERPL-MCNC: 2.4 MG/DL (ref 2.7–4.5)
PLATELET # BLD AUTO: 233 K/UL (ref 150–450)
PMV BLD AUTO: 10.4 FL (ref 9.2–12.9)
POTASSIUM SERPL-SCNC: 3.5 MMOL/L (ref 3.5–5.1)
PROT SERPL-MCNC: 6.9 G/DL (ref 6–8.4)
RBC # BLD AUTO: 4.68 M/UL (ref 4.6–6.2)
SODIUM SERPL-SCNC: 139 MMOL/L (ref 136–145)
WBC # BLD AUTO: 11.04 K/UL (ref 3.9–12.7)

## 2023-06-03 PROCEDURE — 80053 COMPREHEN METABOLIC PANEL: CPT

## 2023-06-03 PROCEDURE — 99223 1ST HOSP IP/OBS HIGH 75: CPT | Mod: ,,, | Performed by: INTERNAL MEDICINE

## 2023-06-03 PROCEDURE — 95720 PR EEG, W/VIDEO, CONT RECORD, I&R, >12<26 HRS: ICD-10-PCS | Mod: ,,, | Performed by: PSYCHIATRY & NEUROLOGY

## 2023-06-03 PROCEDURE — 36415 COLL VENOUS BLD VENIPUNCTURE: CPT

## 2023-06-03 PROCEDURE — 25000003 PHARM REV CODE 250

## 2023-06-03 PROCEDURE — 99223 PR INITIAL HOSPITAL CARE,LEVL III: ICD-10-PCS | Mod: ,,, | Performed by: INTERNAL MEDICINE

## 2023-06-03 PROCEDURE — 84100 ASSAY OF PHOSPHORUS: CPT

## 2023-06-03 PROCEDURE — 95700 EEG CONT REC W/VID EEG TECH: CPT

## 2023-06-03 PROCEDURE — 25000003 PHARM REV CODE 250: Performed by: STUDENT IN AN ORGANIZED HEALTH CARE EDUCATION/TRAINING PROGRAM

## 2023-06-03 PROCEDURE — 99223 PR INITIAL HOSPITAL CARE,LEVL III: ICD-10-PCS | Mod: ,,, | Performed by: STUDENT IN AN ORGANIZED HEALTH CARE EDUCATION/TRAINING PROGRAM

## 2023-06-03 PROCEDURE — 99223 1ST HOSP IP/OBS HIGH 75: CPT | Mod: ,,, | Performed by: STUDENT IN AN ORGANIZED HEALTH CARE EDUCATION/TRAINING PROGRAM

## 2023-06-03 PROCEDURE — 95714 VEEG EA 12-26 HR UNMNTR: CPT

## 2023-06-03 PROCEDURE — 95720 EEG PHY/QHP EA INCR W/VEEG: CPT | Mod: ,,, | Performed by: PSYCHIATRY & NEUROLOGY

## 2023-06-03 PROCEDURE — G0378 HOSPITAL OBSERVATION PER HR: HCPCS

## 2023-06-03 PROCEDURE — 83735 ASSAY OF MAGNESIUM: CPT

## 2023-06-03 PROCEDURE — 85025 COMPLETE CBC W/AUTO DIFF WBC: CPT

## 2023-06-03 RX ORDER — HYDROXYZINE HYDROCHLORIDE 25 MG/1
25 TABLET, FILM COATED ORAL ONCE AS NEEDED
Status: COMPLETED | OUTPATIENT
Start: 2023-06-03 | End: 2023-06-03

## 2023-06-03 RX ADMIN — HYDROXYZINE HYDROCHLORIDE 25 MG: 25 TABLET, FILM COATED ORAL at 09:06

## 2023-06-03 RX ADMIN — CYANOCOBALAMIN TAB 250 MCG 500 MCG: 250 TAB at 09:06

## 2023-06-03 RX ADMIN — FOLIC ACID 1 MG: 1 TABLET ORAL at 09:06

## 2023-06-03 NOTE — HOSPITAL COURSE
Patient admitted due to concern of increasing frequency of seizure-like episodes. Reported feeling improved next day. Had no further episodes while admitted. Hematology consulted for concern for porphyria; does not meet diagnostic criteria. 24 hr urine collection for PBG started. Neurology was consulted with plans to start cEEG and then tentatively transition to epilepsy service on Monday. Patient requesting to go home as he states he only gets his episodes once a month and since he's already had this episode he won't have any more seizures while he is here. EEG done and read was normal. Neurology agreeable to patient discharging home. Will recommend he follow up with epilepsy and hematology in regards to further disease workup.

## 2023-06-03 NOTE — NURSING
Nurses Note -- 4 Eyes      6/2/2023   9:07 PM      Skin assessed during: Admit      [x] No Altered Skin Integrity Present    []Prevention Measures Documented      [] Yes- Altered Skin Integrity Present or Discovered   [] LDA Added if Not in Epic (Describe Wound)   [] New Altered Skin Integrity was Present on Admit and Documented in LDA   [] Wound Image Taken    Wound Care Consulted? No    Attending Nurse:  TOMAS Braun     Second RN/Staff Member:  NICK Garcia

## 2023-06-03 NOTE — PLAN OF CARE
Problem: Adult Inpatient Plan of Care  Goal: Optimal Comfort and Wellbeing  Outcome: Ongoing, Progressing  Goal: Readiness for Transition of Care  Outcome: Ongoing, Progressing     Problem: Adult Inpatient Plan of Care  Goal: Optimal Comfort and Wellbeing  Outcome: Ongoing, Progressing     Problem: Adult Inpatient Plan of Care  Goal: Readiness for Transition of Care  Outcome: Ongoing, Progressing       POC reviewed with patient and his wife at the beside. Verbalization of understanding voiced. Questions and concerns addressed. Precautions maintained. Patient progressing towards goals. Vital signs all shift. See flow sheet. 24 hr urine remains in progress. Still waiting for EEG to be placed. No events noted at present during this shift. Bed low and locked with call light within reach and spouse remains at the beside.

## 2023-06-03 NOTE — PLAN OF CARE
06/03/23 1832   Discharge Plan   Discharge Plan A Home   Discharge Plan B Home       Maria Dolores Stephens RN  Weekend  - [facility]  Spectralink: [phone]
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
## 2023-06-03 NOTE — PLAN OF CARE
Sw met with patient at bedside.  Patient is alert and oriented with no communication barriers.  Patient verified address and PCP is correct on face sheet..  Patient pharmacy of choice is [facility] in [place], [place].  Patient denies any DME use at home.  Patient family will help transport at discharge.   06/03/23 1355   Discharge Planning   Assessment Type Discharge Planning Brief Assessment   Resource/Environmental Concerns none   Support Systems Spouse/significant other;Family members   Equipment Currently Used at Home none   Current Living Arrangements home   Patient/Family Anticipates Transition to home   Patient/Family Anticipated Services at Transition none   DME Needed Upon Discharge  none   Physical Activity   On average, how many days per week do you engage in moderate to strenuous exercise (like a brisk walk)? 0 days   On average, how many minutes do you engage in exercise at this level? 0 min   Financial Resource Strain   How hard is it for you to pay for the very basics like food, housing, medical care, and heating? Not hard   Housing Stability   In the last 12 months, was there a time when you were not able to pay the mortgage or rent on time? N   In the last 12 months, was there a time when you did not have a steady place to sleep or slept in a shelter (including now)? N   Transportation Needs   In the past 12 months, has lack of transportation kept you from medical appointments or from getting medications? no   In the past 12 months, has lack of transportation kept you from meetings, work, or from getting things needed for daily living? No   Food Insecurity   Within the past 12 months, you worried that your food would run out before you got the money to buy more. Never true   Within the past 12 months, the food you bought just didn't last and you didn't have money to get more. Never true   Stress   Do you feel stress - tense, restless, nervous, or anxious, or unable to sleep at night because your mind is  troubled all the time - these days? To some exte   Social Connections   In a typical week, how many times do you talk on the phone with family, friends, or neighbors? Twice a week   How often do you get together with friends or relatives? Once   How often do you attend Yarsani or Restorationism services? Never   Do you belong to any clubs or organizations such as Yarsani groups, unions, fraternal or athletic groups, or school groups? No   How often do you attend meetings of the clubs or organizations you belong to? Never   Are you , , , , never , or living with a partner?    Alcohol Use   Q1: How often do you have a drink containing alcohol? Never   Q2: How many drinks containing alcohol do you have on a typical day when you are drinking? None   Q3: How often do you have six or more drinks on one occasion? Never
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
## 2023-06-03 NOTE — NURSING
MD at bedside assessing and having patient and his wife explain what seizure episodes looked like typically for him prior to hospital.
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
## 2023-06-03 NOTE — SUBJECTIVE & OBJECTIVE
Past Medical History:   Diagnosis Date    Depression     Fibromyalgia     Marijuana use     Peptic ulcer     Scoliosis        Past Surgical History:   Procedure Laterality Date    COLONOSCOPY N/A 4/5/2023    Procedure: COLONOSCOPY sutab;  Surgeon: Angelo Salgado MD;  Location: [facility];  Service: Endoscopy;  Laterality: N/A;    ESOPHAGOGASTRODUODENOSCOPY N/A 4/5/2023    Procedure: EGD (ESOPHAGOGASTRODUODENOSCOPY);  Surgeon: Angelo Salgado MD;  Location: [facility];  Service: Endoscopy;  Laterality: N/A;    KNEE ARTHROSCOPY         Review of patient's allergies indicates:  No Known Allergies    No current facility-administered medications on file prior to encounter.     Current Outpatient Medications on File Prior to Encounter   Medication Sig    venlafaxine (EFFEXOR-XR) 150 MG Cp24 Take 300 mg by mouth once daily.    cyanocobalamin-cobamamide 5,000-100 mcg Subl Place 5,000 mcg under the tongue Daily. Patient states taking 1/2 tab.    dextroamphetamine-amphetamine (ADDERALL XR) 20 MG 24 hr capsule Patient states has not been taking since started with seizures.    ibuprofen (ADVIL,MOTRIN) 800 MG tablet Take 1 tablet (800 mg total) by mouth 3 (three) times daily as needed for Pain. (Patient not taking: Reported on 5/22/2023)    lacosamide (VIMPAT) 200 mg Tab tablet Take 1 tablet (200 mg total) by mouth every 12 (twelve) hours. (Patient not taking: Reported on 5/22/2023)    levETIRAcetam (KEPPRA) 500 MG Tab Take 1 tablet (500 mg total) by mouth 2 (two) times daily. (Patient not taking: Reported on 5/22/2023)    nicotine polacrilex 4 MG Lozg Take 4 mg by mouth as needed.    testosterone (ANDROGEL) 20.25 mg/1.25 gram (1.62 %) GlPm Apply 2 pumps to shoulders daily (Patient not taking: Reported on 5/22/2023)     Family History       Problem Relation (Age of Onset)    No Known Problems Mother, Father          Tobacco Use    Smoking status: Former     Packs/day: 0.50     Types: Cigarettes     Quit date: 9/1/2016     Years  since quittin.7    Smokeless tobacco: Current   Substance and Sexual Activity    Alcohol use: Not Currently    Drug use: Yes     Types: Marijuana     Comment: multiple times per day every day    Sexual activity: Yes     Partners: Female     Review of Systems   Constitutional:  Positive for fatigue. Negative for chills and fever.   Respiratory:  Negative for cough and shortness of breath.    Cardiovascular:  Negative for chest pain and leg swelling.   Neurological:  Positive for seizures and speech difficulty.   Psychiatric/Behavioral:  Positive for confusion and sleep disturbance.    Objective:     Vital Signs (Most Recent):  Temp: 98.4 °F (36.9 °C) (23 1626)  Pulse: 75 (23 1626)  Resp: 16 (23 162)  BP: 125/76 (23 1626)  SpO2: 99 % (23 162) Vital Signs (24h Range):  Temp:  [98.4 °F (36.9 °C)-98.6 °F (37 °C)] 98.4 °F (36.9 °C)  Pulse:  [75-81] 75  Resp:  [16-18] 16  SpO2:  [97 %-99 %] 99 %  BP: (125-130)/(76-80) 125/76     Weight: 77.1 kg (170 lb)  Body mass index is 24.39 kg/m².     Physical Exam  Vitals and nursing note reviewed.   Constitutional:       General: He is not in acute distress.     Appearance: Normal appearance.   Eyes:      Extraocular Movements: Extraocular movements intact.      Pupils: Pupils are equal, round, and reactive to light.   Cardiovascular:      Rate and Rhythm: Normal rate and regular rhythm.   Pulmonary:      Effort: Pulmonary effort is normal. No respiratory distress.   Abdominal:      General: Abdomen is flat. There is no distension.      Tenderness: There is no abdominal tenderness.   Musculoskeletal:         General: No swelling.   Skin:     General: Skin is warm and dry.   Neurological:      General: No focal deficit present.      Mental Status: He is alert and oriented to person, place, and time. Mental status is at baseline.   Psychiatric:         Mood and Affect: Mood normal.         Behavior: Behavior normal.        Significant Labs: All  pertinent labs within the past 24 hours have been reviewed.  CBC:   Recent Labs   Lab 06/02/23  1527 06/02/23  1532   WBC 13.66*  --    HGB 16.0  --    HCT 47.6 48     --      CMP:   Recent Labs   Lab 06/02/23  1527      K 4.1      CO2 26      BUN 12   CREATININE 1.1   CALCIUM 9.8   PROT 8.0   ALBUMIN 4.5   BILITOT 1.4*   ALKPHOS 80   AST 21   ALT 41   ANIONGAP 13       Significant Imaging: I have reviewed all pertinent imaging results/findings within the past 24 hours.

## 2023-06-03 NOTE — NURSING
24 hr urine started at 2054, patient /wife instructed that all urine is to be collected, patient and spouse verbalized understanding.

## 2023-06-03 NOTE — SUBJECTIVE & OBJECTIVE
Interval History: No seizure like activity overnight. Feeling well this morning. Has not been able to get EEG yet.    Review of Systems   Constitutional:  Positive for fatigue. Negative for chills and fever.   Respiratory:  Negative for cough and shortness of breath.    Cardiovascular:  Negative for chest pain and leg swelling.   Neurological:  Positive for seizures and speech difficulty.   Psychiatric/Behavioral:  Positive for confusion and sleep disturbance.    Objective:     Vital Signs (Most Recent):  Temp: 98 °F (36.7 °C) (06/03/23 0700)  Pulse: 73 (06/03/23 0700)  Resp: 18 (06/03/23 0700)  BP: 137/89 (06/03/23 0700)  SpO2: 98 % (06/03/23 0700) Vital Signs (24h Range):  Temp:  [96.8 °F (36 °C)-98.6 °F (37 °C)] 98 °F (36.7 °C)  Pulse:  [70-81] 73  Resp:  [13-18] 18  SpO2:  [93 %-99 %] 98 %  BP: (125-144)/(76-89) 137/89     Weight: 77.1 kg (170 lb)  Body mass index is 24.39 kg/m².  No intake or output data in the 24 hours ending 06/03/23 1118      Physical Exam  Vitals and nursing note reviewed.   Constitutional:       General: He is not in acute distress.     Appearance: Normal appearance.   Eyes:      Extraocular Movements: Extraocular movements intact.      Pupils: Pupils are equal, round, and reactive to light.   Cardiovascular:      Rate and Rhythm: Normal rate and regular rhythm.   Pulmonary:      Effort: Pulmonary effort is normal. No respiratory distress.   Abdominal:      General: Abdomen is flat. There is no distension.      Tenderness: There is no abdominal tenderness.   Musculoskeletal:         General: No swelling.   Skin:     General: Skin is warm and dry.   Neurological:      General: No focal deficit present.      Mental Status: He is alert and oriented to person, place, and time. Mental status is at baseline.   Psychiatric:         Mood and Affect: Mood normal.         Behavior: Behavior normal.           Significant Labs: All pertinent labs within the past 24 hours have been reviewed.  BMP:    Recent Labs   Lab 06/03/23  0451   GLU 96      K 3.5      CO2 26   BUN 12   CREATININE 1.0   CALCIUM 9.7   MG 2.1     CBC:   Recent Labs   Lab 06/02/23  1527 06/02/23  1532 06/03/23  0451   WBC 13.66*  --  11.04   HGB 16.0  --  14.5   HCT 47.6 48 44.0     --  233       Significant Imaging: I have reviewed all pertinent imaging results/findings within the past 24 hours.

## 2023-06-03 NOTE — NURSING
Recently in room discussing plan of care with patient and his wife; patient is extremely anxious reporting that he wants to leave and that he knows the EEG will not show anything.  Will notify team.

## 2023-06-04 VITALS
SYSTOLIC BLOOD PRESSURE: 135 MMHG | RESPIRATION RATE: 18 BRPM | TEMPERATURE: 97 F | HEIGHT: 70 IN | DIASTOLIC BLOOD PRESSURE: 94 MMHG | OXYGEN SATURATION: 97 % | HEART RATE: 101 BPM | WEIGHT: 170 LBS | BODY MASS INDEX: 24.34 KG/M2

## 2023-06-04 LAB
ALBUMIN SERPL BCP-MCNC: 4.1 G/DL (ref 3.5–5.2)
ALP SERPL-CCNC: 73 U/L (ref 55–135)
ALT SERPL W/O P-5'-P-CCNC: 31 U/L (ref 10–44)
ANION GAP SERPL CALC-SCNC: 10 MMOL/L (ref 8–16)
AST SERPL-CCNC: 17 U/L (ref 10–40)
BASOPHILS # BLD AUTO: 0.07 K/UL (ref 0–0.2)
BASOPHILS NFR BLD: 0.7 % (ref 0–1.9)
BILIRUB SERPL-MCNC: 0.8 MG/DL (ref 0.1–1)
BUN SERPL-MCNC: 13 MG/DL (ref 6–20)
CALCIUM SERPL-MCNC: 9.6 MG/DL (ref 8.7–10.5)
CHLORIDE SERPL-SCNC: 105 MMOL/L (ref 95–110)
CO2 SERPL-SCNC: 28 MMOL/L (ref 23–29)
CREAT SERPL-MCNC: 0.9 MG/DL (ref 0.5–1.4)
DIFFERENTIAL METHOD: ABNORMAL
EOSINOPHIL # BLD AUTO: 0.3 K/UL (ref 0–0.5)
EOSINOPHIL NFR BLD: 3.3 % (ref 0–8)
ERYTHROCYTE [DISTWIDTH] IN BLOOD BY AUTOMATED COUNT: 12.5 % (ref 11.5–14.5)
EST. GFR  (NO RACE VARIABLE): >60 ML/MIN/1.73 M^2
GLUCOSE SERPL-MCNC: 95 MG/DL (ref 70–110)
HCT VFR BLD AUTO: 46.1 % (ref 40–54)
HGB BLD-MCNC: 15.4 G/DL (ref 14–18)
IMM GRANULOCYTES # BLD AUTO: 0.03 K/UL (ref 0–0.04)
IMM GRANULOCYTES NFR BLD AUTO: 0.3 % (ref 0–0.5)
LYMPHOCYTES # BLD AUTO: 2.4 K/UL (ref 1–4.8)
LYMPHOCYTES NFR BLD: 24.4 % (ref 18–48)
MAGNESIUM SERPL-MCNC: 2.2 MG/DL (ref 1.6–2.6)
MCH RBC QN AUTO: 31.2 PG (ref 27–31)
MCHC RBC AUTO-ENTMCNC: 33.4 G/DL (ref 32–36)
MCV RBC AUTO: 93 FL (ref 82–98)
MONOCYTES # BLD AUTO: 1.1 K/UL (ref 0.3–1)
MONOCYTES NFR BLD: 10.8 % (ref 4–15)
NEUTROPHILS # BLD AUTO: 6 K/UL (ref 1.8–7.7)
NEUTROPHILS NFR BLD: 60.5 % (ref 38–73)
NRBC BLD-RTO: 0 /100 WBC
PHOSPHATE SERPL-MCNC: 4 MG/DL (ref 2.7–4.5)
PLATELET # BLD AUTO: 261 K/UL (ref 150–450)
PMV BLD AUTO: 10.8 FL (ref 9.2–12.9)
POTASSIUM SERPL-SCNC: 3.9 MMOL/L (ref 3.5–5.1)
PROT SERPL-MCNC: 7.3 G/DL (ref 6–8.4)
RBC # BLD AUTO: 4.94 M/UL (ref 4.6–6.2)
SODIUM SERPL-SCNC: 143 MMOL/L (ref 136–145)
WBC # BLD AUTO: 9.93 K/UL (ref 3.9–12.7)

## 2023-06-04 PROCEDURE — 99239 HOSP IP/OBS DSCHRG MGMT >30: CPT | Mod: ,,, | Performed by: INTERNAL MEDICINE

## 2023-06-04 PROCEDURE — 99239 PR HOSPITAL DISCHARGE DAY,>30 MIN: ICD-10-PCS | Mod: ,,, | Performed by: INTERNAL MEDICINE

## 2023-06-04 PROCEDURE — 84100 ASSAY OF PHOSPHORUS: CPT

## 2023-06-04 PROCEDURE — 99233 PR SUBSEQUENT HOSPITAL CARE,LEVL III: ICD-10-PCS | Mod: ,,, | Performed by: STUDENT IN AN ORGANIZED HEALTH CARE EDUCATION/TRAINING PROGRAM

## 2023-06-04 PROCEDURE — 83735 ASSAY OF MAGNESIUM: CPT

## 2023-06-04 PROCEDURE — 99233 SBSQ HOSP IP/OBS HIGH 50: CPT | Mod: ,,, | Performed by: STUDENT IN AN ORGANIZED HEALTH CARE EDUCATION/TRAINING PROGRAM

## 2023-06-04 PROCEDURE — 85025 COMPLETE CBC W/AUTO DIFF WBC: CPT

## 2023-06-04 PROCEDURE — 36415 COLL VENOUS BLD VENIPUNCTURE: CPT

## 2023-06-04 PROCEDURE — 25000003 PHARM REV CODE 250

## 2023-06-04 PROCEDURE — G0378 HOSPITAL OBSERVATION PER HR: HCPCS

## 2023-06-04 PROCEDURE — 80053 COMPREHEN METABOLIC PANEL: CPT

## 2023-06-04 RX ORDER — VENLAFAXINE 37.5 MG/1
150 TABLET ORAL 2 TIMES DAILY
Status: DISCONTINUED | OUTPATIENT
Start: 2023-06-04 | End: 2023-06-04

## 2023-06-04 RX ORDER — VENLAFAXINE HYDROCHLORIDE 150 MG/1
300 CAPSULE, EXTENDED RELEASE ORAL DAILY
Status: DISCONTINUED | OUTPATIENT
Start: 2023-06-04 | End: 2023-06-04 | Stop reason: HOSPADM

## 2023-06-04 RX ORDER — FOLIC ACID 1 MG/1
1 TABLET ORAL DAILY
Qty: 90 TABLET | Refills: 0 | Status: SHIPPED | OUTPATIENT
Start: 2023-06-05 | End: 2024-03-19

## 2023-06-04 RX ORDER — UBIDECARENONE 75 MG
500 CAPSULE ORAL DAILY
Qty: 90 TABLET | Refills: 2 | Status: SHIPPED | OUTPATIENT
Start: 2023-06-05

## 2023-06-04 RX ADMIN — CYANOCOBALAMIN TAB 250 MCG 500 MCG: 250 TAB at 09:06

## 2023-06-04 RX ADMIN — FOLIC ACID 1 MG: 1 TABLET ORAL at 09:06

## 2023-06-04 RX ADMIN — VENLAFAXINE HYDROCHLORIDE 300 MG: 150 CAPSULE, EXTENDED RELEASE ORAL at 09:06

## 2023-06-04 NOTE — NURSING
On call for [facility] medicine team 4 Dr. Aparicio return paged, this writer explained that the patient in requesting to have the EEG disconnected and he wants to leave due to his anxiety is increasing since having the EEG placed, the patient voiced that since he has cycled through his attacks the EEG will not detect any seizure activity. Dr. Aparicio at the bedside and discussed patients illness and desire to leave at length with the patient and his wife. Dr. Aparicio informed the patient and his wife that it seems as if they should have a discussion amongst themselves about the patients desire to leave. Dr. Aparicio received page and had to end conversation but informed this writer he will review the patients chart and follow-up. POC ongoing.

## 2023-06-04 NOTE — CARE UPDATE
"Called to bedside by night RN, patient and wife at bedside. Expressing frustration at current work-up. He believes his EEG is only causing him anxiety and will not capture any seizure like activity because he is "out of the window for episodes". He states he has cycles of seizure like activity believe he is out of the current cycle. He believes the current admission is an inappropriate allocation of resources and time.     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 He is also adamant that the 24 hour urine will be useless because it "dilutes the porphyria levels"  and that a one time urine study when he is having seizures would be better. Patient would like to leave AMA as this environment is causing him a great deal of anxiety and stress.    Expressed wish of the hospital team to have patient complete his EEG and complete his work-up. Explained risks of leaving AMA. Patient able to verbalize risks of leaving AMA including death, paralysis, permanent disability, and bodily harm. His wife would like him to stay until the morning to discuss with the day team. Patient and wife to discuss staying vs leaving AMA.  "

## 2023-06-04 NOTE — PROCEDURES
DATE: 6/3/23    EEG NUMBER:  [id number]    REFERRING PHYSICIAN:  Dr. Torrez      This EEG was performed to assess for evidence of underlying epilepsy.     ELECTROENCEPHALOGRAM REPORT     METHODOLOGY:  Electroencephalographic (EEG) recording is with electrodes placed according to the International 10-20 placement system.  Thirty two (32) channels of digital signal are simultaneously recorded from the scalp and may include EKG, EMG, and/or eye monitors.   Recording band pass was 0.1 to 512 hz.  Digital video recording of the patient is simultaneously recorded with the EEG.  The staff report clinical symptoms and may press an event button when the patient has symptoms of clinical interest to the treating physicians.  EEG and video recording is stored and archived in digital format.  The entire recording is visually reviewed, and the times identified by computer analysis as being spikes or seizures are reviewed again.  Activation procedures which include photic stimulation, hyperventilation and instructing patients to perform simple task are done in selected patients.   Compresses spectral analysis (CSA) is also performed on the activity recorded from each individual channel.  This is displayed as a power display of frequencies from 0 to 30 Hz over time.   The CSA analysis is done and displayed continuously.  This is reviewed for asymmetries in power between homologous areas of the scalp and for presence of changes in power which can be seen when seizures occur.  Sections of suspected abnormalities on the CSA is then compared with the original EEG recording.                [employer]ok[employer] software was also utilized in the review of this study.  This software suite analyzes the EEG recording in multiple domains.  Coherence and rhythmicity is computed to identify EEG sections which may contain organized seizures.  Each channel undergoes analysis to detect presence of spike and sharp waves which have special and morphological  characteristic of epileptic activity.  The routine EEG recording is converted from spacial into frequency domain.  This is then displayed comparing homologous areas to identify areas of significant asymmetry.  Algorithm to identify non-cortically generated artifact is used to separate eye movement, EMG and other artifact from the EEG.     Recording times   Start on June 43 2023 at hour 12 minute 5 seconds 42   End on June 4, 2023 at hours 7 minute 0 seconds 2   Restart on June 4, 2023 at hours 7 minute 1 seconds 7   End on June 4, 2023 at hours 13 minute 39 seconds 50  The total time of EEG recording for the study was 25 hours and 20 minutes    EEG FINDINGS:  The recording was obtained with a number of standard bipolar and referential montages during wakefulness, drowsiness and sleep.  In the alert state, the posterior background rhythm was a symmetric, well-modulated 9 to 10 Hz alpha rhythm, which reacted symmetrically to eye opening.  Activation procedures were not performed.  During drowsiness, the background rhythm waxed and waned and there were periods of slowing.  During stage II sleep, symmetric V waves and sleep spindles were noted.  There were no focal abnormalities.  There were no interictal epileptiform abnormalities and no clinical or electrographic seizures were recorded.    The EKG channel revealed a sinus rhythm.     IMPRESSION:  This is a normal EEG during wakefulness, drowsiness and sleep.     CLINICAL CORRELATION:  The patient is a  47-year-old male who is being evaluated for cyclical episodes with neurological symptoms.  The patient is currently not maintained on any anti-seizure medications.  This is a normal EEG during wakefulness, drowsiness and sleep.  There is no evidence for neither cortical dysfunction nor an epileptic process on this recording.  No seizures were recorded during this study.
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
## 2023-06-04 NOTE — NURSING
Pt d/c education given. Pt verbalize understanding. PIV removed. Catheter tip intact. Belongings gathered by patient. Pt d/c floor ambulatory.

## 2023-06-04 NOTE — PLAN OF CARE
[facility] - Neurosurgery (Hospital)  Discharge Final Note    Primary Care Provider: Zenobia Dunbra MD    Expected Discharge Date: 6/4/2023    Patient to discharge home today. No post-acute needs identified. Patient is ready to discharge from case management standpoint.    Final Discharge Note (most recent)       Final Note - 06/04/23 1302          Final Note    Assessment Type Final Discharge Note     Anticipated Discharge Disposition Home or Self Care     What phone number can be called within the next 1-3 days to see how you are doing after discharge? [phone]     Hospital Resources/Appts/Education Provided Provided patient/caregiver with written discharge plan information;Appointments scheduled by Navigator/Coordinator        Post-Acute Status    Discharge Delays None known at this time                   Important Message from Medicare         Maria Dolores Stephens RN  Weekend  - [facility]  Spectralink: [phone]

## 2023-06-04 NOTE — SUBJECTIVE & OBJECTIVE
Past Medical History:   Diagnosis Date    Depression     Fibromyalgia     Marijuana use     Peptic ulcer     Scoliosis        Past Surgical History:   Procedure Laterality Date    COLONOSCOPY N/A 4/5/2023    Procedure: COLONOSCOPY sutab;  Surgeon: Angelo Salgado MD;  Location: [facility];  Service: Endoscopy;  Laterality: N/A;    ESOPHAGOGASTRODUODENOSCOPY N/A 4/5/2023    Procedure: EGD (ESOPHAGOGASTRODUODENOSCOPY);  Surgeon: Angelo Salgado MD;  Location: [facility];  Service: Endoscopy;  Laterality: N/A;    KNEE ARTHROSCOPY         Review of patient's allergies indicates:  No Known Allergies    Current Neurological Medications:     No current facility-administered medications on file prior to encounter.     Current Outpatient Medications on File Prior to Encounter   Medication Sig    venlafaxine (EFFEXOR-XR) 150 MG Cp24 Take 300 mg by mouth once daily.    cyanocobalamin-cobamamide 5,000-100 mcg Subl Place 5,000 mcg under the tongue Daily. Patient states taking 1/2 tab.    dextroamphetamine-amphetamine (ADDERALL XR) 20 MG 24 hr capsule Patient states has not been taking since started with seizures.    ibuprofen (ADVIL,MOTRIN) 800 MG tablet Take 1 tablet (800 mg total) by mouth 3 (three) times daily as needed for Pain. (Patient not taking: Reported on 5/22/2023)    lacosamide (VIMPAT) 200 mg Tab tablet Take 1 tablet (200 mg total) by mouth every 12 (twelve) hours. (Patient not taking: Reported on 5/22/2023)    levETIRAcetam (KEPPRA) 500 MG Tab Take 1 tablet (500 mg total) by mouth 2 (two) times daily. (Patient not taking: Reported on 5/22/2023)    nicotine polacrilex 4 MG Lozg Take 4 mg by mouth as needed.    testosterone (ANDROGEL) 20.25 mg/1.25 gram (1.62 %) GlPm Apply 2 pumps to shoulders daily (Patient not taking: Reported on 5/22/2023)     Family History       Problem Relation (Age of Onset)    No Known Problems Mother, Father          Tobacco Use    Smoking status: Former     Packs/day: 0.50     Types:  Cigarettes     Quit date: 2016     Years since quittin.7    Smokeless tobacco: Current   Substance and Sexual Activity    Alcohol use: Not Currently    Drug use: Yes     Types: Marijuana     Comment: multiple times per day every day    Sexual activity: Yes     Partners: Female     Review of Systems   Constitutional:  Negative for appetite change, chills and fever.   HENT:  Negative for congestion, sore throat, trouble swallowing and voice change.    Eyes: Negative.    Respiratory:  Negative for cough and shortness of breath.    Cardiovascular:  Negative for chest pain and leg swelling.   Gastrointestinal:  Negative for abdominal distention, abdominal pain, constipation, nausea and vomiting.   Endocrine: Negative.    Genitourinary: Negative.    Musculoskeletal:  Negative for back pain and gait problem.   Skin: Negative.    Neurological:  Positive for seizures. Negative for weakness and headaches.   Psychiatric/Behavioral:  Positive for hallucinations. The patient is nervous/anxious.    Objective:     Vital Signs (Most Recent):  Temp: 99.3 °F (37.4 °C) (23 1700)  Pulse: 89 (23 1700)  Resp: 18 (23 1700)  BP: 136/88 (23 1500)  SpO2: 98 % (23 1700) Vital Signs (24h Range):  Temp:  [96.8 °F (36 °C)-99.6 °F (37.6 °C)] 99.3 °F (37.4 °C)  Pulse:  [70-89] 89  Resp:  [13-20] 18  SpO2:  [93 %-98 %] 98 %  BP: (127-147)/(81-91) 136/88     Weight: 77.1 kg (170 lb)  Body mass index is 24.39 kg/m².     Physical Exam  Vitals and nursing note reviewed.   Constitutional:       Appearance: Normal appearance.   HENT:      Head: Normocephalic and atraumatic.      Nose: Nose normal.      Mouth/Throat:      Mouth: Mucous membranes are moist.   Eyes:      Extraocular Movements: Extraocular movements intact.      Pupils: Pupils are equal, round, and reactive to light.   Cardiovascular:      Rate and Rhythm: Normal rate and regular rhythm.   Pulmonary:      Breath sounds: Normal breath sounds.   Abdominal:       General: Bowel sounds are normal. There is no distension.      Palpations: Abdomen is soft.      Tenderness: There is no abdominal tenderness.   Musculoskeletal:         General: No swelling. Normal range of motion.      Cervical back: Normal range of motion.   Skin:     General: Skin is warm.      Capillary Refill: Capillary refill takes less than 2 seconds.   Neurological:      General: No focal deficit present.      Mental Status: He is alert and oriented to person, place, and time. Mental status is at baseline.      Cranial Nerves: Cranial nerves 2-12 are intact.      Motor: Motor strength is normal.      Coordination: Finger-Nose-Finger Test normal.      Deep Tendon Reflexes:      Reflex Scores:       Bicep reflexes are 2+ on the right side and 2+ on the left side.       Patellar reflexes are 2+ on the right side and 2+ on the left side.  Psychiatric:         Speech: Speech normal.        NEUROLOGICAL EXAMINATION:     MENTAL STATUS   Oriented to person, place, and time.   Attention: normal. Concentration: normal.   Speech: speech is normal   Level of consciousness: alert    CRANIAL NERVES   Cranial nerves II through XII intact.     CN III, IV, VI   Pupils are equal, round, and reactive to light.    MOTOR EXAM   Muscle bulk: normal  Right arm pronator drift: absent  Left arm pronator drift: absent    Strength   Strength 5/5 throughout.     REFLEXES     Reflexes   Right biceps: 2+  Left biceps: 2+  Right patellar: 2+  Left patellar: 2+    SENSORY EXAM   Light touch normal.     GAIT AND COORDINATION      Coordination   Finger to nose coordination: normal    Significant Labs: CBC:   Recent Labs   Lab 06/02/23  1527 06/02/23  1532 06/03/23  0451   WBC 13.66*  --  11.04   HGB 16.0  --  14.5   HCT 47.6 48 44.0     --  233     CMP:   Recent Labs   Lab 06/02/23  1527 06/03/23  0451    96    139   K 4.1 3.5    104   CO2 26 26   BUN 12 12   CREATININE 1.1 1.0   CALCIUM 9.8 9.7   MG  --  2.1    PROT 8.0 6.9   ALBUMIN 4.5 3.9   BILITOT 1.4* 1.5*   ALKPHOS 80 71   AST 21 19   ALT 41 34   ANIONGAP 13 9     All pertinent lab results from the past 24 hours have been reviewed.    Significant Imaging: I have reviewed and interpreted all pertinent imaging results/findings within the past 24 hours.

## 2023-06-04 NOTE — NURSING
Sitting in recliner at bedside; reports being discharged home today and EEG to be removed.  Inform awaiting orders; remove VISI per patient request.
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
## 2023-06-04 NOTE — DISCHARGE SUMMARY
"[facility] - [facility] ([facility]  Discharge Summary      Patient Name: Luis Daniel Wynne Jr.  MRN: [id number]  GERSON: [id number]  Patient Class: OP- Observation  Admission Date: 6/2/2023  Hospital Length of Stay: 0 days  Discharge Date and Time:  06/04/2023 2:01 PM  Attending Physician: Zakiya Torrez MD   Discharging Provider: Kumar Yadav MD  Primary Care Provider: Zenobia Dunbar MD  [facility] Medicine Team: [facility] MED 4 Kumar Yadav MD  Primary Care Team: [facility] 4    HPI:   47 y.o. M with PMHx of depression, fibromyalgia, ADHD, PUD, BPH, and seizures presenting to the ED for seizure-like activity. History provided by patient and wife. They state that he has had an increase in seizure-like episodes since yesterday. Has been seen by epilepsy and was planned for EMU admission on 6/5 for seizure observation. Due to the increase in his seizure-like activity his epileptologist recommended he come to the ED today. Patient reports he has "attacks" that occur approximately every 28 days. During the attacks, he experiences hallucinations, paralysis, convulsions, fatigue, brain fog, insomnia, memory loss, blurred vision, altered sense of smell and taste, decreased appetite, and seizures. Has been seen by providers in multiple specialities for these symptoms. Was started on vimpat in 2022 which was uptitrated without much improvement. In November 2022 was seen by epilepsy and keppra was added. Patient has stopped taking these medications as he feels like they were not helping. Most recently, there has been concern for a diagnosis of porphyria based on an elevated urine uroporphyrin. Has referral to hematology at [facility] to further discuss. Hematology was consulted in the ED and stated that the patient does not meet criteria for diagnosis of porphyria.    In the ED, patient was afebrile, HDS on room air. Labs notable for leukocytosis to 13.6, elevated bilirubin 1.4, UDS + " for THC, UA w/ 1+ ketones, trace occult blood. CXR wnl. CT head no acute abnormality. No seizure activity observed while in ED. Patient admitted to  for further management.          * No surgery found *      Hospital Course:   Patient admitted due to concern of increasing frequency of seizure-like episodes. Reported feeling improved next day. Had no further episodes while admitted. Hematology consulted for concern for porphyria; does not meet diagnostic criteria. 24 hr urine collection for PBG started. Neurology was consulted with plans to start cEEG and then tentatively transition to epilepsy service on Monday. Patient requesting to go home as he states he only gets his episodes once a month and since he's already had this episode he won't have any more seizures while he is here. EEG done and read was normal. Neurology agreeable to patient discharging home. Will recommend he follow up with epilepsy and hematology in regards to further disease workup.     Vitals:    06/04/23 1207   BP: (!) 135/94   Pulse: 101   Resp: 18   Temp: 97 °F (36.1 °C)     Physical Exam  Vitals and nursing note reviewed.   Constitutional:       General: He is not in acute distress.     Appearance: Normal appearance.   Eyes:      Extraocular Movements: Extraocular movements intact.      Pupils: Pupils are equal, round, and reactive to light.   Cardiovascular:      Rate and Rhythm: Normal rate and regular rhythm.   Pulmonary:      Effort: Pulmonary effort is normal. No respiratory distress.   Abdominal:      General: Abdomen is flat. There is no distension.      Tenderness: There is no abdominal tenderness.   Musculoskeletal:         General: No swelling.   Skin:     General: Skin is warm and dry.   Neurological:      General: No focal deficit present.      Mental Status: He is alert and oriented to person, place, and time. Mental status is at baseline.   Psychiatric:         Mood and Affect: Mood normal; anxious.         Behavior: Behavior  normal.       Goals of Care Treatment Preferences:  Code Status: Full Code      Consults:   Consults (From admission, onward)          Status Ordering Provider     Inpatient consult to Hematology/Oncology  Once        Provider:  (Not yet assigned)    Completed NETTE GREGORIO     Inpatient consult to Neurology  Once        Provider:  (Not yet assigned)    Completed NETTE GREGORIO            No new Assessment & Plan notes have been filed under this hospital service since the last note was generated.  Service: Hospital Medicine    Final Active Diagnoses:    Diagnosis Date Noted POA    PRINCIPAL PROBLEM:  Seizure-like activity [R56.9] 09/22/2022 Yes    Tobacco use [Z72.0] 06/02/2023 Yes    BPH with urinary obstruction [N40.1, N13.8] 08/03/2022 Yes    Attention deficit hyperactivity disorder (ADHD) [F90.9] 06/15/2022 Yes      Problems Resolved During this Admission:       Discharged Condition: stable    Disposition: Home or Self Care    Follow Up:    Patient Instructions:   No discharge procedures on file.    Significant Diagnostic Studies: N/A    Pending Diagnostic Studies:       Procedure Component Value Units Date/Time    Lacosamide (Vimpat) [[id number]] Collected: 06/02/23 1527    Order Status: Sent Lab Status: In process Updated: 06/02/23 1544    Specimen: Blood     Levetiracetam level [[id number]] Collected: 06/02/23 1527    Order Status: Sent Lab Status: In process Updated: 06/02/23 1544    Specimen: Blood            Medications:  Reconciled Home Medications:      Medication List        START taking these medications      cyanocobalamin 500 MCG tablet  Take 1 tablet (500 mcg total) by mouth once daily.  Start taking on: June 5, 2023  Replaces: cyanocobalamin-cobamamide 5,000-100 mcg Subl     folic acid 1 MG tablet  Commonly known as: FOLVITE  Take 1 tablet (1 mg total) by mouth once daily.  Start taking on: June 5, 2023            CONTINUE taking these medications      nicotine polacrilex 4 MG Lozg  Take 4 mg by  mouth as needed.     venlafaxine 150 MG Cp24  Commonly known as: EFFEXOR-XR  Take 300 mg by mouth once daily.            STOP taking these medications      cyanocobalamin-cobamamide 5,000-100 mcg Subl  Replaced by: cyanocobalamin 500 MCG tablet     dextroamphetamine-amphetamine 20 MG 24 hr capsule  Commonly known as: ADDERALL XR     ibuprofen 800 MG tablet  Commonly known as: ADVIL,MOTRIN     lacosamide 200 mg Tab tablet  Commonly known as: VIMPAT     levETIRAcetam 500 MG Tab  Commonly known as: KEPPRA            ASK your doctor about these medications      testosterone 20.25 mg/1.25 gram (1.62 %) Glpm  Commonly known as: AndroGeL  Apply 2 pumps to shoulders daily              Indwelling Lines/Drains at time of discharge:   Lines/Drains/Airways       None                   Time spent on the discharge of patient: 35 minutes         Kumar Yadav MD  Department of Hospital Medicine  [facility] - Neurosurgery ([facility])

## 2023-06-04 NOTE — SUBJECTIVE & OBJECTIVE
Subjective:     Interval History: EEG placed overnight and patient refers that he was having very vivid dreams that is part of his attacks and then today he is back to his normal self and attack is over for which he wants to go home because he is withdrawing from his home medications. He says the he does not want to complete EMU as it will not be diagnostic. He refers that he does not have seizures as he can control his symptoms and attacks and can predict them.   EEG with no electrographic activity suggestive of epilepsy.   Hospital medicine okay with discharge. Patient is medically stable and can be discharged. Will notify EMU that he will not be presenting tomorrow.     Current Neurological Medications: as detailed below     Current Facility-Administered Medications   Medication Dose Route Frequency Provider Last Rate Last Admin    acetaminophen tablet 650 mg  650 mg Oral Q8H PRATIF Hernandez MD        cyanocobalamin tablet 500 mcg  500 mcg Oral Daily Aline Hernandez MD   500 mcg at 06/04/23 0934    dextrose 10% bolus 125 mL 125 mL  12.5 g Intravenous PRATIF Hernandez MD        dextrose 10% bolus 250 mL 250 mL  25 g Intravenous PRN Aline Hernandez MD        dextrose 40 % gel 15,000 mg  15 g Oral PRN Aline Hernandez MD        dextrose 40 % gel 30,000 mg  30 g Oral PRN Aline Hernandez MD        folic acid tablet 1 mg  1 mg Oral Daily Aline Hernandez MD   1 mg at 06/04/23 0935    glucagon (human recombinant) injection 1 mg  1 mg Intramuscular KARMEN Hernandez MD        LORazepam injection 4 mg  4 mg Intravenous Q10 Min KARMEN Hernandez MD        melatonin tablet 6 mg  6 mg Oral Nightly PRATIF Hernandez MD        naloxone 0.4 mg/mL injection 0.02 mg  0.02 mg Intravenous PRATIF Hernandez MD        simethicone chewable tablet 80 mg  1 tablet Oral TID PRATIF Hernandez MD        sodium chloride 0.9% flush 10 mL  10 mL Intravenous Q12H PRATIF Hernandez MD        venlafaxine 24 hr capsule 300 mg  300 mg Oral Daily Aline Hernandez MD   300 mg at  06/04/23 0934       Review of Systems  Constitutional:  Negative for appetite change, chills and fever.   HENT:  Negative for congestion, sore throat, trouble swallowing and voice change.    Eyes: Negative.    Respiratory:  Negative for cough and shortness of breath.    Cardiovascular:  Negative for chest pain and leg swelling.   Gastrointestinal:  Negative for abdominal distention, abdominal pain, constipation, nausea and vomiting.   Endocrine: Negative.    Genitourinary: Negative.    Musculoskeletal:  Negative for back pain and gait problem.   Skin: Negative.    Neurological:  Positive for seizures. Negative for weakness and headaches.   Psychiatric/Behavioral:  Positive for hallucinations. The patient is nervous/anxious.    Objective:     Vital Signs (Most Recent):  Temp: 97 °F (36.1 °C) (06/04/23 1207)  Pulse: 101 (06/04/23 1207)  Resp: 18 (06/04/23 1207)  BP: (!) 135/94 (06/04/23 1207)  SpO2: 97 % (06/04/23 1207) Vital Signs (24h Range):  Temp:  [97 °F (36.1 °C)-99.3 °F (37.4 °C)] 97 °F (36.1 °C)  Pulse:  [] 101  Resp:  [6-18] 18  SpO2:  [97 %-100 %] 97 %  BP: (128-144)/(87-94) 135/94     Weight: 77.1 kg (170 lb)  Body mass index is 24.39 kg/m².     Physical Exam   Vitals and nursing note reviewed.   Constitutional:       Appearance: Normal appearance.   HENT:      Head: Normocephalic and atraumatic.      Nose: Nose normal.      Mouth/Throat:      Mouth: Mucous membranes are moist.   Eyes:      Extraocular Movements: Extraocular movements intact.      Pupils: Pupils are equal, round, and reactive to light.   Cardiovascular:      Rate and Rhythm: Normal rate and regular rhythm.   Pulmonary:      Breath sounds: Normal breath sounds.   Abdominal:      General: Bowel sounds are normal. There is no distension.      Palpations: Abdomen is soft.      Tenderness: There is no abdominal tenderness.   Musculoskeletal:         General: No swelling. Normal range of motion.      Cervical back: Normal range of motion.    Skin:     General: Skin is warm.      Capillary Refill: Capillary refill takes less than 2 seconds.   Neurological:      General: No focal deficit present.      Mental Status: He is alert and oriented to person, place, and time. Mental status is at baseline.      Cranial Nerves: Cranial nerves 2-12 are intact.      Motor: Motor strength is normal.      Coordination: Finger-Nose-Finger Test normal.      Deep Tendon Reflexes:      Reflex Scores:       Bicep reflexes are 2+ on the right side and 2+ on the left side.       Patellar reflexes are 2+ on the right side and 2+ on the left side.  Psychiatric:         Speech: Speech normal.        NEUROLOGICAL EXAMINATION:     MENTAL STATUS   Oriented to person, place, and time.   Attention: normal. Concentration: normal.   Speech: speech is normal   Level of consciousness: alert    CRANIAL NERVES   Cranial nerves II through XII intact.     CN III, IV, VI   Pupils are equal, round, and reactive to light.    MOTOR EXAM   Muscle bulk: normal  Right arm pronator drift: absent  Left arm pronator drift: absent    Strength   Strength 5/5 throughout.     REFLEXES     Reflexes   Right biceps: 2+  Left biceps: 2+  Right patellar: 2+  Left patellar: 2+    SENSORY EXAM   Light touch normal.     GAIT AND COORDINATION      Coordination   Finger to nose coordination: normal       Significant Labs: CBC:   Recent Labs   Lab 06/02/23  1527 06/02/23  1532 06/03/23  0451 06/04/23  0501   WBC 13.66*  --  11.04 9.93   HGB 16.0  --  14.5 15.4   HCT 47.6 48 44.0 46.1     --  233 261     CMP:   Recent Labs   Lab 06/02/23  1527 06/03/23  0451 06/04/23  0501    96 95    139 143   K 4.1 3.5 3.9    104 105   CO2 26 26 28   BUN 12 12 13   CREATININE 1.1 1.0 0.9   CALCIUM 9.8 9.7 9.6   MG  --  2.1 2.2   PROT 8.0 6.9 7.3   ALBUMIN 4.5 3.9 4.1   BILITOT 1.4* 1.5* 0.8   ALKPHOS 80 71 73   AST 21 19 17   ALT 41 34 31   ANIONGAP 13 9 10     All pertinent lab results from the past 24  hours have been reviewed.    Significant Imaging: I have reviewed and interpreted all pertinent imaging results/findings within the past 24 hours.

## 2023-06-04 NOTE — ASSESSMENT & PLAN NOTE
48 y/o M with PMHx of depression, fibromyalgia, ADHD, PUD, BPH, and seizure like events currently under workup patient of DR Parisi who presented to the ED for breakthrough event/attack and admitted to  for possible event capture. He is scheduled for EMU on Monday per Dr. Parisi for event characterization but he had his attacks on 6/1-6/2 for which he came per her request. He has multiple different types with atypical presentation and different semiology of what he calls seizures and convulsions (see hPI for details) and he has been on Vimpat and Keppra in the past but he stopped these 2 months ago as it was prolonging his attacks and prodromal symptoms. He refers that these initially were evry 28 days, but he had 3 weeks of attack on April and then 2 in May.   This most recent one started with him loosing his sense of smell, GI system stops (no appetite, no growling) insomnia, fatigue, anxiety, diaphoresis and then he had 3 seizures that are typically at night where he has full body shaking per wife.   Patient also refers convulstions that are triggered by emotions (he had one while getting EEG placed) and then he has full body shaking and does not loose awareness.   He has had EEG that showed R frontal discharges c.f seizures. He has also been under workup for gI symptoms and per Hem/onc OP there was possible porphyria but he was seen by Hem/onc here and labs not consistent. .    Current presentation is atypical for seizures and has RF and clinical signs for non epileptic events but he does have abnormal EEG and there's seizure types such as frontal seizures that can behave differently. Patient could also have epileptic and non epileptic issues.     Recommendations   -- Appreciate admission to  to further evaluate other medical issues including porphyria   -- place EMU montage of EEG  possible event capture  -- Continue to hold AED as patient not taking them at home   -- If medical workup  unremarkable will discuss with epilepsy team to transition patient to EMU admission   -- Seizure precautions   -- instructed to hit button if any symptoms come

## 2023-06-04 NOTE — ASSESSMENT & PLAN NOTE
48 y/o M with PMHx of depression, fibromyalgia, ADHD, PUD, BPH, and seizure like events currently under workup patient of DR Parisi who presented to the ED for breakthrough event/attack and admitted to  for possible event capture. He is scheduled for EMU on Monday per Dr. Parisi for event characterization but he had his attacks on 6/1-6/2 for which he came per her request. He has multiple different types with atypical presentation and different semiology of what he calls seizures and convulsions (see hPI for details) and he has been on Vimpat and Keppra in the past but he stopped these 2 months ago as it was prolonging his attacks and prodromal symptoms. He refers that these initially were evry 28 days, but he had 3 weeks of attack on April and then 2 in May.   This most recent one started with him loosing his sense of smell, GI system stops (no appetite, no growling) insomnia, fatigue, anxiety, diaphoresis and then he had 3 seizures that are typically at night where he has full body shaking per wife.   Patient also refers convulstions that are triggered by emotions (he had one while getting EEG placed) and then he has full body shaking and does not loose awareness.   He has had EEG that showed R frontal discharges c.f seizures. He has also been under workup for gI symptoms and per Hem/onc OP there was possible porphyria but he was seen by Hem/onc here and labs not consistent. .    Current presentation is atypical for seizures and has RF and clinical signs for non epileptic events but he does have abnormal EEG and there's seizure types such as frontal seizures that can behave differently. Patient could also have epileptic and non epileptic issues.     EEG overnight with no electrographic changes suggestive of epilepsy. Patient did have some of his typical symptoms such as vivid dreams but he says that his attack is now over and that he does not have epileptic seizures but it's a systemic issue such as  his porphyria. With this history suspect that epileptic seizures is less likely; patient is not interested in EMU and will discuss with OP neurologist to coordinate for when he has his attack. Patient is medically stable    Recommendations   -- Appreciate admission to  for admission   -- okay to d/c and will notify EMU coordinator that patient will not be showing for admission   -- Will notify OP neurologist   -- Will hold AED as patient has not been taking them and there is high suspicion that these are not epileptic seizures and theres no indication for AED, specially with normal EEG.   -- d/ EEG

## 2023-06-04 NOTE — PLAN OF CARE
Problem: Adult Inpatient Plan of Care  Goal: Optimal Comfort and Wellbeing  Outcome: Ongoing, Progressing  Goal: Readiness for Transition of Care  Outcome: Ongoing, Progressing     Problem: Adult Inpatient Plan of Care  Goal: Optimal Comfort and Wellbeing  Outcome: Ongoing, Progressing     Problem: Adult Inpatient Plan of Care  Goal: Readiness for Transition of Care  Outcome: Ongoing, Progressing       POC reviewed with patient and his wife at the beside. Verbalization of understanding voiced. Questions and concerns addressed. Precautions maintained. Patient progressing towards goals. Vital signs all shift. See flow sheet. EEG remains in place. No events noted at present during this shift. Bed low and locked with call light within reach and spouse remains at the beside.

## 2023-06-04 NOTE — NURSING
Call received from Dr. Aparicio; inform of patient's anxiety and wanting to leave; patient reports runs in cycles and that he will not have another attack for approximately 28 days.  MD reports will or Hydroxizine prn anxiety and to see if patient accepting of this.  Notifying oncoming nurse.
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
## 2023-06-05 ENCOUNTER — TELEPHONE (OUTPATIENT)
Dept: NEUROLOGY | Facility: CLINIC | Age: 48
End: 2023-06-05
Payer: COMMERCIAL

## 2023-06-05 LAB
LACOSAMIDE: <0.5 MCG/ML (ref 1–10)
LEVETIRACETAM SERPL-MCNC: <1 UG/ML (ref 3–60)

## 2023-06-05 NOTE — TELEPHONE ENCOUNTER
Left message explaining per last MD note from ED admission over the weekend, via Dr. Escobedo, that he is not admitting to EMU this morning so I am canceling the admission. Called [phone] and canceled this admission w/ Miguel. I have alerted NIKA Dias w/ pre service as well. The following is the MD note:

## 2023-06-07 ENCOUNTER — TELEPHONE (OUTPATIENT)
Dept: HEMATOLOGY/ONCOLOGY | Facility: CLINIC | Age: 48
End: 2023-06-07
Payer: COMMERCIAL

## 2023-06-07 NOTE — TELEPHONE ENCOUNTER
"----- Message from Julissaraghav Pacheco sent at 6/7/2023 10:43 AM CDT -----  Regarding: Pt advice  Contact: Peterson Winkler calling in regards to forms sent over for genetic testing stating they would need to know the reason why testing is being done which was on Pg 2. Please fax information to [phone]     Confirmed contact below:   Contact Name: Rosaline  Phone Number: [phone]               Additional Notes:  "Thank you for all that you do for our patients"                                           "

## 2023-06-09 ENCOUNTER — PATIENT MESSAGE (OUTPATIENT)
Dept: NEUROLOGY | Facility: CLINIC | Age: 48
End: 2023-06-09
Payer: COMMERCIAL

## 2023-06-16 ENCOUNTER — PATIENT MESSAGE (OUTPATIENT)
Dept: PODIATRY | Facility: CLINIC | Age: 48
End: 2023-06-16
Payer: COMMERCIAL

## 2023-07-03 ENCOUNTER — TELEPHONE (OUTPATIENT)
Dept: INTERNAL MEDICINE | Facility: CLINIC | Age: 48
End: 2023-07-03
Payer: COMMERCIAL

## 2023-07-03 NOTE — TELEPHONE ENCOUNTER
Spoke to pt and informed no available appt today, 's next available is in August. Otherwise in our clinic next week. Pt declined and will go to UC, he not happy about it but said he will go.

## 2023-07-03 NOTE — TELEPHONE ENCOUNTER
----- Message from Elan Huang sent at 7/3/2023 11:51 AM CDT -----  Type:  Same Day Appointment Request    Caller is requesting a same day appointment.  Caller declined first available appointment listed below.    Name of Caller: Luis Daniel  When is the first available appointment? 10-  Symptoms: back & shoulder pain  Best Call Back Number: [phone]  Additional Information:  no

## 2023-07-04 ENCOUNTER — PATIENT MESSAGE (OUTPATIENT)
Dept: NEUROLOGY | Facility: CLINIC | Age: 48
End: 2023-07-04
Payer: COMMERCIAL

## 2023-07-05 ENCOUNTER — TELEPHONE (OUTPATIENT)
Dept: NEUROLOGY | Facility: CLINIC | Age: 48
End: 2023-07-05
Payer: COMMERCIAL

## 2023-07-05 ENCOUNTER — PATIENT MESSAGE (OUTPATIENT)
Dept: NEUROLOGY | Facility: CLINIC | Age: 48
End: 2023-07-05
Payer: COMMERCIAL

## 2023-07-05 NOTE — TELEPHONE ENCOUNTER
Attempted to call wife back, no answer, left message.  Will send mychart message as well as try to schedule patient as add on patient.

## 2023-07-06 ENCOUNTER — TELEPHONE (OUTPATIENT)
Dept: NEUROLOGY | Facility: CLINIC | Age: 48
End: 2023-07-06
Payer: COMMERCIAL

## 2023-07-06 ENCOUNTER — TELEPHONE (OUTPATIENT)
Dept: GASTROENTEROLOGY | Facility: CLINIC | Age: 48
End: 2023-07-06
Payer: COMMERCIAL

## 2023-07-07 ENCOUNTER — OFFICE VISIT (OUTPATIENT)
Dept: HEMATOLOGY/ONCOLOGY | Facility: CLINIC | Age: 48
End: 2023-07-07
Payer: COMMERCIAL

## 2023-07-07 ENCOUNTER — OFFICE VISIT (OUTPATIENT)
Dept: NEUROLOGY | Facility: CLINIC | Age: 48
End: 2023-07-07
Payer: COMMERCIAL

## 2023-07-07 VITALS
DIASTOLIC BLOOD PRESSURE: 88 MMHG | WEIGHT: 175.13 LBS | HEIGHT: 70 IN | SYSTOLIC BLOOD PRESSURE: 129 MMHG | BODY MASS INDEX: 25.07 KG/M2 | HEART RATE: 76 BPM

## 2023-07-07 DIAGNOSIS — R68.89 SPELLS OF DECREASED ATTENTIVENESS: ICD-10-CM

## 2023-07-07 DIAGNOSIS — Z79.899 ENCOUNTER FOR LONG-TERM (CURRENT) USE OF HIGH-RISK MEDICATION: ICD-10-CM

## 2023-07-07 DIAGNOSIS — R56.9 SEIZURE-LIKE ACTIVITY: Primary | ICD-10-CM

## 2023-07-07 DIAGNOSIS — E53.8 B12 DEFICIENCY DUE TO DIET: Primary | ICD-10-CM

## 2023-07-07 PROCEDURE — 3074F PR MOST RECENT SYSTOLIC BLOOD PRESSURE < 130 MM HG: ICD-10-PCS | Mod: CPTII,S$GLB,, | Performed by: STUDENT IN AN ORGANIZED HEALTH CARE EDUCATION/TRAINING PROGRAM

## 2023-07-07 PROCEDURE — 99215 OFFICE O/P EST HI 40 MIN: CPT | Mod: 95,,, | Performed by: INTERNAL MEDICINE

## 2023-07-07 PROCEDURE — 1159F MED LIST DOCD IN RCRD: CPT | Mod: CPTII,S$GLB,, | Performed by: STUDENT IN AN ORGANIZED HEALTH CARE EDUCATION/TRAINING PROGRAM

## 2023-07-07 PROCEDURE — 99999 PR PBB SHADOW E&M-EST. PATIENT-LVL IV: ICD-10-PCS | Mod: PBBFAC,,, | Performed by: STUDENT IN AN ORGANIZED HEALTH CARE EDUCATION/TRAINING PROGRAM

## 2023-07-07 PROCEDURE — 3079F DIAST BP 80-89 MM HG: CPT | Mod: CPTII,S$GLB,, | Performed by: STUDENT IN AN ORGANIZED HEALTH CARE EDUCATION/TRAINING PROGRAM

## 2023-07-07 PROCEDURE — 1159F PR MEDICATION LIST DOCUMENTED IN MEDICAL RECORD: ICD-10-PCS | Mod: CPTII,S$GLB,, | Performed by: STUDENT IN AN ORGANIZED HEALTH CARE EDUCATION/TRAINING PROGRAM

## 2023-07-07 PROCEDURE — 99215 PR OFFICE/OUTPT VISIT, EST, LEVL V, 40-54 MIN: ICD-10-PCS | Mod: 95,,, | Performed by: INTERNAL MEDICINE

## 2023-07-07 PROCEDURE — 3008F BODY MASS INDEX DOCD: CPT | Mod: CPTII,S$GLB,, | Performed by: STUDENT IN AN ORGANIZED HEALTH CARE EDUCATION/TRAINING PROGRAM

## 2023-07-07 PROCEDURE — 3074F SYST BP LT 130 MM HG: CPT | Mod: CPTII,S$GLB,, | Performed by: STUDENT IN AN ORGANIZED HEALTH CARE EDUCATION/TRAINING PROGRAM

## 2023-07-07 PROCEDURE — 99214 OFFICE O/P EST MOD 30 MIN: CPT | Mod: S$GLB,,, | Performed by: STUDENT IN AN ORGANIZED HEALTH CARE EDUCATION/TRAINING PROGRAM

## 2023-07-07 PROCEDURE — 3008F PR BODY MASS INDEX (BMI) DOCUMENTED: ICD-10-PCS | Mod: CPTII,S$GLB,, | Performed by: STUDENT IN AN ORGANIZED HEALTH CARE EDUCATION/TRAINING PROGRAM

## 2023-07-07 PROCEDURE — 3079F PR MOST RECENT DIASTOLIC BLOOD PRESSURE 80-89 MM HG: ICD-10-PCS | Mod: CPTII,S$GLB,, | Performed by: STUDENT IN AN ORGANIZED HEALTH CARE EDUCATION/TRAINING PROGRAM

## 2023-07-07 PROCEDURE — 99999 PR PBB SHADOW E&M-EST. PATIENT-LVL IV: CPT | Mod: PBBFAC,,, | Performed by: STUDENT IN AN ORGANIZED HEALTH CARE EDUCATION/TRAINING PROGRAM

## 2023-07-07 PROCEDURE — 99214 PR OFFICE/OUTPT VISIT, EST, LEVL IV, 30-39 MIN: ICD-10-PCS | Mod: S$GLB,,, | Performed by: STUDENT IN AN ORGANIZED HEALTH CARE EDUCATION/TRAINING PROGRAM
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
## 2023-07-07 NOTE — PROGRESS NOTES
Hematology and Medical Oncology   Follow Up     07/07/2023    Reason For Referral: Question of porphyria     Telemedicine Documentation:  The patient location is: home  The chief complaint leading to consultation is: tertiary center referral    Visit type: audiovisual    Face to Face time with patient: 25  40 minutes of total time spent on the encounter, which includes face to face time and non-face to face time preparing to see the patient (eg, review of tests), Obtaining and/or reviewing separately obtained history, Documenting clinical information in the electronic or other health record, Independently interpreting results (not separately reported) and communicating results to the patient/family/caregiver, or Care coordination (not separately reported).     Each patient to whom he or she provides medical services by telemedicine is:  (1) informed of the relationship between the physician and patient and the respective role of any other health care provider with respect to management of the patient; and (2) notified that he or she may decline to receive medical services by telemedicine and may withdraw from such care at any time.    History of Present Ilness:   Luis Daniel Bond) is a pleasant 47 y.o.male who presents virtually to discuss labs.    At last appointment he felt like his energy was about 60%. Put on weight the last few days. Feels like vision is now blurry. Vision problems use to come and go, but now nothing makes it better.     Currently eating well, with no restrictions or limitations. When he has an attack has no appetite, things taste and smell bad. During these times he has difficulty remaining hydrated.    Last episode duration was 3-4 weeks. Previously episodes would last 3-4 days and there would be about 4 weeks between episodes.     During the attacks he experiences no pain. He is not having bowel movements. Making very little urine.     The attacks have been happening for about  5 years.     Suddenly lactose intolerant.     Experiencing memory loss - [place].    Feels like attacks happen primarily at night.     Gets sweats, diarrhea, blurry vision during attacks.     GI suggests that this could be porphyria. Initial labs drawn.     Interval History:  Repeat attack on July 4th with seizure like activity. Spent time in the EMU, no seizures activity identified. Most symptoms were GI in nature. Psych has adjusted his medications. Thinks this is driven by seratonin surges.    First week off of venlofaxin.     Monitoring vital signs at home. SBP elevated to 160/170.       PAST MEDICAL HISTORY:   Past Medical History:   Diagnosis Date    Depression     Fibromyalgia     Marijuana use     Peptic ulcer     Scoliosis        PAST SURGICAL HISTORY:   Past Surgical History:   Procedure Laterality Date    COLONOSCOPY N/A 4/5/2023    Procedure: COLONOSCOPY sutab;  Surgeon: Angelo Salgado MD;  Location: [facility];  Service: Endoscopy;  Laterality: N/A;    ESOPHAGOGASTRODUODENOSCOPY N/A 4/5/2023    Procedure: EGD (ESOPHAGOGASTRODUODENOSCOPY);  Surgeon: Angelo Salgado MD;  Location: [facility];  Service: Endoscopy;  Laterality: N/A;    KNEE ARTHROSCOPY         PAST SOCIAL HISTORY:   reports that he quit smoking about 6 years ago. His smoking use included cigarettes. He smoked an average of .5 packs per day. He uses smokeless tobacco. He reports that he does not currently use alcohol. He reports current drug use. Drug: Marijuana.    FAMILY HISTORY:  Family History   Problem Relation Age of Onset    No Known Problems Mother     No Known Problems Father     Heart disease Neg Hx     Hypertension Neg Hx        CURRENT MEDICATIONS:   Current Outpatient Medications   Medication Sig    cyanocobalamin 500 MCG tablet Take 1 tablet (500 mcg total) by mouth once daily.    folic acid (FOLVITE) 1 MG tablet Take 1 tablet (1 mg total) by mouth once daily. (Patient taking differently: Take 1 mg by  mouth daily as needed.)    nicotine polacrilex 4 MG Lozg Take 4 mg by mouth as needed.    testosterone (ANDROGEL) 20.25 mg/1.25 gram (1.62 %) GlPm Apply 2 pumps to shoulders daily (Patient not taking: Reported on 5/22/2023)    venlafaxine (EFFEXOR-XR) 150 MG Cp24 Take 300 mg by mouth once daily.     No current facility-administered medications for this visit.     ALLERGIES:   Review of patient's allergies indicates:  No Known Allergies      Review of Systems:     Review of Systems   Constitutional:  Positive for appetite change and unexpected weight change.   HENT:   Positive for trouble swallowing. Negative for mouth sores.    Eyes:  Positive for eye problems.   Respiratory:  Negative for cough and shortness of breath.    Cardiovascular:  Negative for chest pain.   Gastrointestinal:  Positive for diarrhea and nausea.   Genitourinary:  Positive for frequency.    Musculoskeletal:  Positive for back pain.   Skin:  Negative for rash.   Neurological:  Positive for dizziness. Negative for headaches.   Hematological:  Negative for adenopathy.   Psychiatric/Behavioral:  Positive for sleep disturbance. The patient is nervous/anxious.         Physical Exam:   Limited secondary to virtual visit    ECOG Performance Status: (foot note - ECOG PS provided by Eastern Cooperative Oncology Group) 2 - Symptomatic, <50% confined to bed    Karnofsky Performance Score:  80%- Normal Activity with Effort: Some Symptoms of Disease    Labs:   Lab Results   Component Value Date    WBC 9.93 06/04/2023    HGB 15.4 06/04/2023    HCT 46.1 06/04/2023     06/04/2023    CHOL 153 06/15/2022    TRIG 182 (H) 06/15/2022    HDL 37 (L) 06/15/2022    ALT 31 06/04/2023    AST 17 06/04/2023     06/04/2023    K 3.9 06/04/2023     06/04/2023    CREATININE 0.9 06/04/2023    BUN 13 06/04/2023    CO2 28 06/04/2023    TSH 1.496 06/02/2023    HGBA1C 5.0 12/01/2011     Tests from 4/3/23:  Uroporphyrin: 45 [elevated]  Pophobilinogen: 1.1    In  this sample, the excretion of uroporphyrin was elevated. This finding is likely related to increased stimulation of the heme-forming system by a stressor, such as ethanol, a   variety of medications (sulfonamides, aminopyrine or diallyl barbiturate), a physiologic steroid or exposure to arsenic.     5/2/23  Uroporphyhyrin: 16  In this sample, the porphyrins profile was normal.     Total Porphyrins: Total plasma porphyrins are normal, fractionation not   performed.       Labs from 5/13/23:  PBG Deaminase: 9.6  In this sample, the erythrocyte porphobilinogen deaminase activity was normal.      Imaging: Previous imaging has been reviewed    Assessment and Plan:     Mr. Wynne is a pleasant 47 year old with a question of porphyria.    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 I have provided the patient with an opportunity to ask questions and have all questions answered to his satisfaction.       he will return to clinic PRN, but knows to call in the interim if symptoms change or should a problem arise.        Irais Palomo MD  Hematology and Medical Oncology  Bone Marrow Transplant  [facility]      BMT Chart Routing      Follow up with physician Other. PRN   Follow up with SHIRLEY    Provider visit type    Infusion scheduling note    Injection scheduling note    Labs    Imaging    Pharmacy appointment    Other referrals

## 2023-07-07 NOTE — PATIENT INSTRUCTIONS
VISIT FOLLOW UP    It was nice to see you today.  Here is what we discussed at your visit:    - Ambulatory EEG monitoring for 96 hours  - Ambulatory referral to neuropsychological testing     Dr. FAIRBANKS's contact information: office phone [phone], or contact via [employer]

## 2023-07-27 ENCOUNTER — PATIENT MESSAGE (OUTPATIENT)
Dept: NEUROLOGY | Facility: CLINIC | Age: 48
End: 2023-07-27
Payer: COMMERCIAL

## 2023-07-28 ENCOUNTER — TELEPHONE (OUTPATIENT)
Dept: NEUROLOGY | Facility: CLINIC | Age: 48
End: 2023-07-28
Payer: COMMERCIAL

## 2023-07-28 NOTE — TELEPHONE ENCOUNTER
"Order form completed for 72 hour vEEG, No telemetry, signed, and faxed to Peri Le with [employer]. Included are last clinic notes, EEG Reports (9/22/22, 6/4/23), & face sheet. Faxed to [phone]    Your fax has been successfully sent to [phone] at [phone].  ------------------------------------------------------------  From: [id number]  ------------------------------------------------------------  7/28/2023 8:19:46 AM Transmission Record   Sent to [phone] with remote ID "[id number]"   Result: (0/339;0/0) Success   Page record: 1 - 25   Elapsed time: 09:20 on channel 59      "

## 2023-08-07 ENCOUNTER — TELEPHONE (OUTPATIENT)
Dept: NEUROLOGY | Facility: CLINIC | Age: 48
End: 2023-08-07
Payer: COMMERCIAL

## 2023-08-17 ENCOUNTER — TELEPHONE (OUTPATIENT)
Dept: GASTROENTEROLOGY | Facility: CLINIC | Age: 48
End: 2023-08-17
Payer: COMMERCIAL

## 2023-08-17 ENCOUNTER — OFFICE VISIT (OUTPATIENT)
Dept: GASTROENTEROLOGY | Facility: CLINIC | Age: 48
End: 2023-08-17
Payer: COMMERCIAL

## 2023-08-17 VITALS — HEIGHT: 70 IN | WEIGHT: 177 LBS | BODY MASS INDEX: 25.34 KG/M2

## 2023-08-17 DIAGNOSIS — R19.7 DIARRHEA, UNSPECIFIED TYPE: ICD-10-CM

## 2023-08-17 DIAGNOSIS — R10.13 EPIGASTRIC PAIN: Primary | ICD-10-CM

## 2023-08-17 DIAGNOSIS — R41.89 BRAIN FOG: ICD-10-CM

## 2023-08-17 DIAGNOSIS — K90.9 INTESTINAL MALABSORPTION, UNSPECIFIED TYPE: ICD-10-CM

## 2023-08-17 PROCEDURE — 99999 PR PBB SHADOW E&M-EST. PATIENT-LVL III: ICD-10-PCS | Mod: PBBFAC,,, | Performed by: INTERNAL MEDICINE

## 2023-08-17 PROCEDURE — 99214 OFFICE O/P EST MOD 30 MIN: CPT | Mod: S$GLB,,, | Performed by: INTERNAL MEDICINE

## 2023-08-17 PROCEDURE — 99999 PR PBB SHADOW E&M-EST. PATIENT-LVL III: CPT | Mod: PBBFAC,,, | Performed by: INTERNAL MEDICINE

## 2023-08-17 PROCEDURE — 3008F BODY MASS INDEX DOCD: CPT | Mod: CPTII,S$GLB,, | Performed by: INTERNAL MEDICINE

## 2023-08-17 PROCEDURE — 3008F PR BODY MASS INDEX (BMI) DOCUMENTED: ICD-10-PCS | Mod: CPTII,S$GLB,, | Performed by: INTERNAL MEDICINE

## 2023-08-17 PROCEDURE — 1159F PR MEDICATION LIST DOCUMENTED IN MEDICAL RECORD: ICD-10-PCS | Mod: CPTII,S$GLB,, | Performed by: INTERNAL MEDICINE

## 2023-08-17 PROCEDURE — 99214 PR OFFICE/OUTPT VISIT, EST, LEVL IV, 30-39 MIN: ICD-10-PCS | Mod: S$GLB,,, | Performed by: INTERNAL MEDICINE

## 2023-08-17 PROCEDURE — 1159F MED LIST DOCD IN RCRD: CPT | Mod: CPTII,S$GLB,, | Performed by: INTERNAL MEDICINE

## 2023-08-17 NOTE — TELEPHONE ENCOUNTER
----- Message from Malik Dc sent at 8/17/2023  7:51 AM CDT -----  Contact: Pt.  .Type:  Needs Medical Advice    Who Called: pt    Would the patient rather a call back or a response via [facility]?  Call back  Best Call Back Number: [phone]  Additional Information: Pt. Called to tell the office they will be 10 minutes for their appt. @ 8:00 a.m.

## 2023-08-17 NOTE — Clinical Note
Hello I see you tried to reach this patient, they are desperatly tryign to get into the neuropsych clinic...  I told them I would message you in that regard, it is best to call his wife number which is listed...

## 2023-08-17 NOTE — Clinical Note
Adarsh  I am seeing this patient for evaluation of multiple GI + numerous systemic symptoms. Basically, I have performed which I believe is more than exhaustive evaluation without obvious answer. The wife and patient are seeking answers, and I suggested perhaps they seek out the [facility] (they were also told by hematology, and apparently have referral pending) The patient and wife desire to get another opinion within the [facility] system.... I feel it would be best to attempt to coordinate this thru you and possibly another GI , rather than just have him show up at someones door.  I have summarized in my note basically the eval which has been done.  I know this is a somewhat odd request, but I honestly dont know what more to offer him , and perhaps after seeing another GI doc, he will feel better evaluated...  Let me know your thoughts. Thanks ZAK
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
## 2023-08-18 ENCOUNTER — HOSPITAL ENCOUNTER (OUTPATIENT)
Dept: RADIOLOGY | Facility: HOSPITAL | Age: 48
Discharge: HOME | End: 2023-08-18
Attending: INTERNAL MEDICINE
Payer: COMMERCIAL

## 2023-08-18 DIAGNOSIS — R10.13 EPIGASTRIC PAIN: ICD-10-CM

## 2023-08-18 DIAGNOSIS — R19.7 DIARRHEA, UNSPECIFIED TYPE: ICD-10-CM

## 2023-08-18 PROCEDURE — 74177 CT ABD & PELVIS W/CONTRAST: CPT | Mod: 26,,, | Performed by: RADIOLOGY

## 2023-08-18 PROCEDURE — 74177 CT ABDOMEN PELVIS WITH CONTRAST: ICD-10-PCS | Mod: 26,,, | Performed by: RADIOLOGY

## 2023-08-18 PROCEDURE — 25500020 PHARM REV CODE 255: Performed by: INTERNAL MEDICINE

## 2023-08-18 PROCEDURE — A9698 NON-RAD CONTRAST MATERIALNOC: HCPCS | Performed by: INTERNAL MEDICINE

## 2023-08-18 PROCEDURE — 74177 CT ABD & PELVIS W/CONTRAST: CPT | Mod: TC

## 2023-08-18 RX ADMIN — IOHEXOL 1000 ML: 9 SOLUTION ORAL at 10:08

## 2023-08-18 RX ADMIN — IOHEXOL 75 ML: 350 INJECTION, SOLUTION INTRAVENOUS at 11:08

## 2023-08-30 NOTE — TELEPHONE ENCOUNTER
Received email from Juliane Christiansen that this was originally scheduled for 9/1/23 and patient has canceled saying he wants to ask Dr. FAIRBANKS if he really needs this done:

## 2023-09-12 ENCOUNTER — PATIENT MESSAGE (OUTPATIENT)
Dept: NEUROLOGY | Facility: CLINIC | Age: 48
End: 2023-09-12
Payer: COMMERCIAL

## 2023-09-13 ENCOUNTER — PATIENT MESSAGE (OUTPATIENT)
Dept: INTERNAL MEDICINE | Facility: CLINIC | Age: 48
End: 2023-09-13
Payer: COMMERCIAL

## 2023-09-13 NOTE — TELEPHONE ENCOUNTER
Returned patient's call.  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  He reports he stopped  eating and going to the bathroom for a few days.  He states his body shuts down.  He is interested in pursuing a 2nd opinion.  He would like to be evaluated at the [facility].

## 2023-09-25 ENCOUNTER — OFFICE VISIT (OUTPATIENT)
Dept: INTERNAL MEDICINE | Facility: CLINIC | Age: 48
End: 2023-09-25
Payer: COMMERCIAL

## 2023-09-25 DIAGNOSIS — K92.1 BLOOD IN STOOL: Primary | ICD-10-CM

## 2023-09-25 DIAGNOSIS — K62.89 RECTAL PAIN: ICD-10-CM

## 2023-09-25 PROCEDURE — 1160F PR REVIEW ALL MEDS BY PRESCRIBER/CLIN PHARMACIST DOCUMENTED: ICD-10-PCS | Mod: CPTII,95,, | Performed by: INTERNAL MEDICINE

## 2023-09-25 PROCEDURE — 1159F MED LIST DOCD IN RCRD: CPT | Mod: CPTII,95,, | Performed by: INTERNAL MEDICINE

## 2023-09-25 PROCEDURE — 99213 OFFICE O/P EST LOW 20 MIN: CPT | Mod: 95,,, | Performed by: INTERNAL MEDICINE

## 2023-09-25 PROCEDURE — 99213 PR OFFICE/OUTPT VISIT, EST, LEVL III, 20-29 MIN: ICD-10-PCS | Mod: 95,,, | Performed by: INTERNAL MEDICINE

## 2023-09-25 PROCEDURE — 1160F RVW MEDS BY RX/DR IN RCRD: CPT | Mod: CPTII,95,, | Performed by: INTERNAL MEDICINE

## 2023-09-25 PROCEDURE — 1159F PR MEDICATION LIST DOCUMENTED IN MEDICAL RECORD: ICD-10-PCS | Mod: CPTII,95,, | Performed by: INTERNAL MEDICINE

## 2023-09-25 NOTE — PROGRESS NOTES
The patient location is:  [place]  The chief complaint leading to consultation is:  Rectal pain    Visit type: audiovisual    Face to Face time with patient:  15   minutes of total time spent on the encounter, which includes face to face time and non-face to face time preparing to see the patient (eg, review of tests), Obtaining and/or reviewing separately obtained history, Documenting clinical information in the electronic or other health record, Independently interpreting results (not separately reported) and communicating results to the patient/family/caregiver, or Care coordination (not separately reported).         Each patient to whom he or she provides medical services by telemedicine is:  (1) informed of the relationship between the physician and patient and the respective role of any other health care provider with respect to management of the patient; and (2) notified that he or she may decline to receive medical services by telemedicine and may withdraw from such care at any time.    Notes:   CC:  Rectal pain  HPI:  The patient is a 47 y.o. year old male who presents to the office for rectal pain.  He reports long history of diarrhea, but developed rectal pain about 2 weeks ago.  He also reports blood-tinged stool.  Pain affects his ambulation      PAST MEDICAL HISTORY:  Past Medical History:   Diagnosis Date    Depression     Fibromyalgia     Marijuana use     Peptic ulcer     Scoliosis        SURGICAL HISTORY:  Past Surgical History:   Procedure Laterality Date    COLONOSCOPY N/A 4/5/2023    Procedure: COLONOSCOPY sutab;  Surgeon: Angelo Salgado MD;  Location: [facility];  Service: Endoscopy;  Laterality: N/A;    ESOPHAGOGASTRODUODENOSCOPY N/A 4/5/2023    Procedure: EGD (ESOPHAGOGASTRODUODENOSCOPY);  Surgeon: Angelo Salgado MD;  Location: [facility];  Service: Endoscopy;  Laterality: N/A;    KNEE ARTHROSCOPY         MEDS:  Medcard reviewed and updated    ALLERGIES: Allergy Card reviewed and  updated    SOCIAL HISTORY:   The patient is a nonsmoker.    PE:   APPEARANCE: Well nourished, well developed, in no acute distress.    Video visit  PSYCHIATRIC: The patient is oriented to person, place, and time and has a pleasant affect.        ASSESSMENT/PLAN:  Diagnoses and all orders for this visit:    Blood in stool  -     Ambulatory referral/consult to Colorectal Surgery; Future    Rectal pain  -     Ambulatory referral/consult to Colorectal Surgery; Future
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
## 2023-09-29 ENCOUNTER — PATIENT MESSAGE (OUTPATIENT)
Dept: GASTROENTEROLOGY | Facility: CLINIC | Age: 48
End: 2023-09-29
Payer: COMMERCIAL

## 2023-10-03 ENCOUNTER — OFFICE VISIT (OUTPATIENT)
Dept: NEUROLOGY | Facility: CLINIC | Age: 48
End: 2023-10-03
Payer: COMMERCIAL

## 2023-10-03 ENCOUNTER — OFFICE VISIT (OUTPATIENT)
Dept: GASTROENTEROLOGY | Facility: CLINIC | Age: 48
End: 2023-10-03
Payer: COMMERCIAL

## 2023-10-03 VITALS
HEIGHT: 70 IN | SYSTOLIC BLOOD PRESSURE: 115 MMHG | WEIGHT: 172.38 LBS | BODY MASS INDEX: 24.68 KG/M2 | HEART RATE: 109 BPM | DIASTOLIC BLOOD PRESSURE: 85 MMHG

## 2023-10-03 DIAGNOSIS — K90.9 INTESTINAL MALABSORPTION, UNSPECIFIED TYPE: ICD-10-CM

## 2023-10-03 DIAGNOSIS — R10.13 EPIGASTRIC PAIN: Primary | ICD-10-CM

## 2023-10-03 DIAGNOSIS — F41.1 GAD (GENERALIZED ANXIETY DISORDER): Primary | ICD-10-CM

## 2023-10-03 DIAGNOSIS — K92.1 BLOOD IN STOOL: ICD-10-CM

## 2023-10-03 DIAGNOSIS — F90.9 ATTENTION DEFICIT HYPERACTIVITY DISORDER (ADHD), UNSPECIFIED ADHD TYPE: ICD-10-CM

## 2023-10-03 DIAGNOSIS — R41.89 BRAIN FOG: ICD-10-CM

## 2023-10-03 DIAGNOSIS — R19.7 DIARRHEA, UNSPECIFIED TYPE: ICD-10-CM

## 2023-10-03 DIAGNOSIS — F44.4 FUNCTIONAL NEUROLOGICAL SYMPTOM DISORDER (CONVERSION DISORDER), WITH ABNORMAL MOVEMENT: ICD-10-CM

## 2023-10-03 DIAGNOSIS — K62.89 RECTAL PAIN: ICD-10-CM

## 2023-10-03 PROCEDURE — 3008F BODY MASS INDEX DOCD: CPT | Mod: CPTII,S$GLB,, | Performed by: STUDENT IN AN ORGANIZED HEALTH CARE EDUCATION/TRAINING PROGRAM

## 2023-10-03 PROCEDURE — 3079F PR MOST RECENT DIASTOLIC BLOOD PRESSURE 80-89 MM HG: ICD-10-PCS | Mod: CPTII,S$GLB,, | Performed by: STUDENT IN AN ORGANIZED HEALTH CARE EDUCATION/TRAINING PROGRAM

## 2023-10-03 PROCEDURE — 99214 OFFICE O/P EST MOD 30 MIN: CPT | Mod: S$GLB,,, | Performed by: STUDENT IN AN ORGANIZED HEALTH CARE EDUCATION/TRAINING PROGRAM

## 2023-10-03 PROCEDURE — 99499 NO LOS: ICD-10-PCS | Mod: 95,,, | Performed by: CLINICAL NEUROPSYCHOLOGIST

## 2023-10-03 PROCEDURE — 99214 PR OFFICE/OUTPT VISIT, EST, LEVL IV, 30-39 MIN: ICD-10-PCS | Mod: S$GLB,,, | Performed by: STUDENT IN AN ORGANIZED HEALTH CARE EDUCATION/TRAINING PROGRAM

## 2023-10-03 PROCEDURE — 3074F SYST BP LT 130 MM HG: CPT | Mod: CPTII,S$GLB,, | Performed by: STUDENT IN AN ORGANIZED HEALTH CARE EDUCATION/TRAINING PROGRAM

## 2023-10-03 PROCEDURE — 3079F DIAST BP 80-89 MM HG: CPT | Mod: CPTII,S$GLB,, | Performed by: STUDENT IN AN ORGANIZED HEALTH CARE EDUCATION/TRAINING PROGRAM

## 2023-10-03 PROCEDURE — 1159F PR MEDICATION LIST DOCUMENTED IN MEDICAL RECORD: ICD-10-PCS | Mod: CPTII,S$GLB,, | Performed by: STUDENT IN AN ORGANIZED HEALTH CARE EDUCATION/TRAINING PROGRAM

## 2023-10-03 PROCEDURE — 90791 PSYCH DIAGNOSTIC EVALUATION: CPT | Mod: 95,,, | Performed by: CLINICAL NEUROPSYCHOLOGIST

## 2023-10-03 PROCEDURE — 99999 PR PBB SHADOW E&M-EST. PATIENT-LVL III: ICD-10-PCS | Mod: PBBFAC,,, | Performed by: STUDENT IN AN ORGANIZED HEALTH CARE EDUCATION/TRAINING PROGRAM

## 2023-10-03 PROCEDURE — 3008F PR BODY MASS INDEX (BMI) DOCUMENTED: ICD-10-PCS | Mod: CPTII,S$GLB,, | Performed by: STUDENT IN AN ORGANIZED HEALTH CARE EDUCATION/TRAINING PROGRAM

## 2023-10-03 PROCEDURE — 99999 PR PBB SHADOW E&M-EST. PATIENT-LVL III: CPT | Mod: PBBFAC,,, | Performed by: STUDENT IN AN ORGANIZED HEALTH CARE EDUCATION/TRAINING PROGRAM

## 2023-10-03 PROCEDURE — 90791 PR PSYCHIATRIC DIAGNOSTIC EVALUATION: ICD-10-PCS | Mod: 95,,, | Performed by: CLINICAL NEUROPSYCHOLOGIST

## 2023-10-03 PROCEDURE — 99499 UNLISTED E&M SERVICE: CPT | Mod: 95,,, | Performed by: CLINICAL NEUROPSYCHOLOGIST

## 2023-10-03 PROCEDURE — 3074F PR MOST RECENT SYSTOLIC BLOOD PRESSURE < 130 MM HG: ICD-10-PCS | Mod: CPTII,S$GLB,, | Performed by: STUDENT IN AN ORGANIZED HEALTH CARE EDUCATION/TRAINING PROGRAM

## 2023-10-03 PROCEDURE — 1159F MED LIST DOCD IN RCRD: CPT | Mod: CPTII,S$GLB,, | Performed by: STUDENT IN AN ORGANIZED HEALTH CARE EDUCATION/TRAINING PROGRAM

## 2023-10-03 NOTE — PROGRESS NOTES
"    [facility] Consultation Note    Reason for Consult:  The primary encounter diagnosis was Epigastric pain. Diagnoses of Diarrhea, unspecified type, Intestinal malabsorption, unspecified type, and Brain fog were also pertinent to this visit.    PCP:   Zenobia Dunbar       Referring MD:  No referring provider defined for this encounter.    HPI:  This is a 47 y.o. male here for evaluation of     He notes that his symptoms date back many years.  He started to have digestive issues roughly 10 years ago beginning with lactose intolerance.  He felt that with dairy ingestion he would have GI symptoms.      He notes that beginning about a year ago, he has once monthly his "digestive system shuts down."  He feels that he is unable to digest his food, it seems to stick in his chest.  He would no longer have bowel movements or pass gas during this time.  He also comments that his GI system would be silent and he does not hear any gurgling noises.      As well, he has had seizures during the time periods when his GI system has shut down.      He notes that this occurs symptoms seem to coincide with a full moon.  Outside of the episodes he is otherwise well.  He notes that his dog generally sense when symptoms are about to begin.    He has seen numerous specialists including Dr. Salgado (GI), Neuro, neuropsych, and heme onc.    He also suffers from depression and fibromyalgia.      Per Dr. Salgado's note -   Has had extensive eval for porphyria as one test was slightly elevated, ultimately repeat testing normal and apparently genetic testing for porphyia negative  Has had extensive other evals:  Normal stool studies including parasites and calprotectin  Normal TTG  Normal 5hiaa  Normal HIDA  Normal whipple test, normal strongy, normal cyclo and microsporidia  Normal [id number] and angioedema workup    Endoscopy eval w/Dr. Salgado  EGD:   4/2023   Egd and colon  Gastritis  Normal colon to TI with biopsies, " "2 small polyps    PATH  Negative disaccharidase.     1. DUODENAL BIOPSIES:   -  No significant histopathologic abnormality; there is no histopathologic evidence of celiac disease.     2. STOMACH BIOPSIES:     -  Chemical gastritis/reactive gastropathy with mild chronic inflammation and focal mild glandular atrophy.     -  Helicobacter pylori are not identified on routine staining.     -  No evidence of intestinal metaplasia, dysplasia or malignancy.     3. RANDOM COLON BIOPSIES:   -  No significant histopathologic abnormality; there is no evidence of microscopic colitis.       4. TRANSVERSE COLON POLYP BIOPSIES:   -  Sessile serrated adenoma with focal low-grade serrated dysplasia.     -  No evidence of high-grade dysplasia or malignancy.       5. SIGMOID COLON POLYP BIOPSIES:   -  Sessile serrated polyp with features of sessile serrated adenoma and low-grade serrated dysplasia.     -  No evidence of high-grade dysplasia or malignancy.         Objective Findings:    Vital Signs:  /85   Pulse 109   Ht 5' 10" (1.778 m)   Wt 78.2 kg (172 lb 6.4 oz)   BMI 24.74 kg/m²   Body mass index is 24.74 kg/m².    Physical Exam:  General Appearance: Well appearing in no acute distress    Abdomen: Soft, non tender, non distended with positive bowel sounds in all four quadrants. No hepatosplenomegaly, ascites, or mass      Assessment:  1. Epigastric pain    2. Diarrhea, unspecified type    3. Intestinal malabsorption, unspecified type    4. Brain fog      In brief, the patient has a complicated clinical presentation with numerous GI complaints.  His symptoms history is somewhat atypical and his medical evaluation has been largely unrevealing.  He is pursuing a second opinion at [facility] which is reasonable, though I suspect it will be unrevealing or offer little change to his management from a GI perspective.      He raised consideration of a conversion disorder, or psychosomatic related disease.  This seems like a " likely etiology given his mental health history and previously reported trauma.  He is working with a psych provider and will continue to do so.      I have encouraged him to proceed with his second opinion at [facility] and continue working with mental health provider.  At present, there is not any additional testing that I would personally recommend from a GI perspective.         No follow-ups on file.      Order summary:         Thank you so much for allowing me to participate in the care of Luis Daniel Howard MD

## 2023-10-10 ENCOUNTER — OFFICE VISIT (OUTPATIENT)
Dept: NEUROLOGY | Facility: CLINIC | Age: 48
End: 2023-10-10
Payer: COMMERCIAL

## 2023-10-10 DIAGNOSIS — R41.9 COGNITIVE COMPLAINTS: Primary | ICD-10-CM

## 2023-10-10 DIAGNOSIS — F90.9 ATTENTION DEFICIT HYPERACTIVITY DISORDER (ADHD), UNSPECIFIED ADHD TYPE: ICD-10-CM

## 2023-10-10 DIAGNOSIS — F41.1 GAD (GENERALIZED ANXIETY DISORDER): ICD-10-CM

## 2023-10-10 DIAGNOSIS — R68.89 SPELLS OF DECREASED ATTENTIVENESS: ICD-10-CM

## 2023-10-10 DIAGNOSIS — F32.1 CURRENT MODERATE EPISODE OF MAJOR DEPRESSIVE DISORDER WITHOUT PRIOR EPISODE: ICD-10-CM

## 2023-10-10 PROCEDURE — 96138 PR PSYCH/NEUROPSYCH TEST ADMIN/SCORING, BY TECH, 2+ TESTS, 1ST 30 MIN: ICD-10-PCS | Mod: S$GLB,,, | Performed by: CLINICAL NEUROPSYCHOLOGIST

## 2023-10-10 PROCEDURE — 96132 NRPSYC TST EVAL PHYS/QHP 1ST: CPT | Mod: S$GLB,,, | Performed by: CLINICAL NEUROPSYCHOLOGIST

## 2023-10-10 PROCEDURE — 99499 UNLISTED E&M SERVICE: CPT | Mod: S$GLB,,, | Performed by: CLINICAL NEUROPSYCHOLOGIST

## 2023-10-10 PROCEDURE — 99999 PR PBB SHADOW E&M-EST. PATIENT-LVL II: ICD-10-PCS | Mod: PBBFAC,,, | Performed by: CLINICAL NEUROPSYCHOLOGIST

## 2023-10-10 PROCEDURE — 99999 PR PBB SHADOW E&M-EST. PATIENT-LVL II: CPT | Mod: PBBFAC,,, | Performed by: CLINICAL NEUROPSYCHOLOGIST

## 2023-10-10 PROCEDURE — 96133 NRPSYC TST EVAL PHYS/QHP EA: CPT | Mod: S$GLB,,, | Performed by: CLINICAL NEUROPSYCHOLOGIST

## 2023-10-10 PROCEDURE — 96139 PSYCL/NRPSYC TST TECH EA: CPT | Mod: S$GLB,,, | Performed by: CLINICAL NEUROPSYCHOLOGIST

## 2023-10-10 PROCEDURE — 96138 PSYCL/NRPSYC TECH 1ST: CPT | Mod: S$GLB,,, | Performed by: CLINICAL NEUROPSYCHOLOGIST

## 2023-10-10 PROCEDURE — 96133 PR NEUROPSYCHOLOGIC TEST EVAL SVCS, EA ADDTL HR: ICD-10-PCS | Mod: S$GLB,,, | Performed by: CLINICAL NEUROPSYCHOLOGIST

## 2023-10-10 PROCEDURE — 96132 PR NEUROPSYCHOLOGIC TEST EVAL SVCS, 1ST HR: ICD-10-PCS | Mod: S$GLB,,, | Performed by: CLINICAL NEUROPSYCHOLOGIST

## 2023-10-10 PROCEDURE — 96139 PR PSYCH/NEUROPSYCH TEST ADMIN/SCORING, BY TECH, 2+ TESTS, EA ADDTL 30 MIN: ICD-10-PCS | Mod: S$GLB,,, | Performed by: CLINICAL NEUROPSYCHOLOGIST

## 2023-10-10 PROCEDURE — 99499 NO LOS: ICD-10-PCS | Mod: S$GLB,,, | Performed by: CLINICAL NEUROPSYCHOLOGIST

## 2023-10-12 ENCOUNTER — TELEPHONE (OUTPATIENT)
Dept: NEUROLOGY | Facility: CLINIC | Age: 48
End: 2023-10-12

## 2023-10-18 ENCOUNTER — TELEPHONE (OUTPATIENT)
Dept: NEUROLOGY | Facility: CLINIC | Age: 48
End: 2023-10-18
Payer: COMMERCIAL

## 2023-10-18 NOTE — TELEPHONE ENCOUNTER
Called patient to discuss EEG availability.  Patient wants to have an EEG as soon as he knows the seizure is coming on.  I explained that this cannot be arranged as EEGs (ambulatory, EMU, etc) are scheduled ahead of time and it takes a while to schedule them.  He is still not interested in trying antiseizure medications, which is not unreasonable given the high clinical suspicion for conversion disorder/PNEE.

## 2023-10-19 NOTE — PROGRESS NOTES
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   O x 4 across both evaluation days   Gait:  Independent   Psychomotor:  Slight shaking observed in his hand while completing drawing tasks    Handedness:  Right   Vision & Hearing:  Adequate for session   Speech/language: Normal in rate, rhythm, tone, and volume. No significant word finding difficulty observed. Comprehension was normal during the clinical interview, though he asked for testing instructions repeated and clarified at times to ensure his comprehension during testing.    Mood/Affect:  The patient's mood was reported as being "in a depressive state." He displayed full range of affect during interview. He appeared anxious on the day of testing.    Interpersonal Behavior:  Rapport was quickly and easily established    Suicidality/Homicidality: Denied   Hallucinations/Delusions:  None evidenced or endorsed   Thought Content: Logical   Though Processes: Goal-directed   Insight & Judgment:  Appropriate   Participation in Interview:  Full     PROCEDURES/TESTS ADMINISTERED: In addition to performing a review of pertinent medical records, reviewing limits to confidentiality, conducting a clinical interview, and explaining procedures, the following measures were " administered by NICKI Moreira, a trained psychometrician/psychometrist under the direct supervision of Dr. Goodson: MSVT; Test of Premorbid Functioning (TOPF); Wechsler Adult Intelligence Scale, Fourth Edition (WAIS-IV) [Digit Span, Symbol Search, and Coding subtests]; Wechsler Memory Scale - Fourth Edition (WMS-IV) [Logical Memory subtest]; Corley Verbal Learning Test-Revised (HVLT-R; Form 1); Brief Visuospatial Memory Test-Revised (BVMT-R, form 1); Neuropsychological Assessment Battery (NAB) [Naming subtest, form 1]; Verbal fluency tests (FAS & animal naming; Ricardo et al., 2004 norms); Obed Complex Figure Test (RCFT) [copy only]; Trail Making Test, parts A and B (Ricardo et al., 2004 norms); [place] Card Sorting Test -64 card version (WCST-64); Coronado Depression Inventory-Second Edition (BDI-2); and State Trait Anxiety Inventory-Short Form (STAI-SF). Manual norms were used unless otherwise indicated.      TEST TAKING BEHAVIOR AND VALIDITY: Mr. Wynne was generally quiet and focused throughout the session. He worked quickly and appeared to become slightly embarrassed and self-critical when tasks increased in difficulty. He self-corrected whenever possible, but occasionally became discouraged and needed some reassurance to continue with testing. Scores on stand-alone and embedded performance validity measures were within normal limits. The current results, therefore, are likely an accurate reflection of the patient's current functioning.    TEST RESULTS    Raw Score Type of Standardized Score Standardized Score Percentile/CP   MSVT  - - -   MSVT  - - -   MSVT Cons 100 - - -   MSVT  - - -   MSVT FR 85 - - -   ACS LM II Rec 26 - - -   ACS RDS 11 - - -   HVLT-R Recognition Discrimination 8 - - -   PREMORBID FUNCTIONING Raw Score Type of Standardized Score Standardized Score Percentile/CP   TOPF simple dem. eFSIQ -  63   TOPF pred. eFSIQ - SS 98 45   TOPF simple + pred. eFSIQ -  58    LANGUAGE FUNCTIONING Raw Score Type of Standardized Score Standardized Score Percentile/CP   TOP Word Reading 37 SS 95 37   NAB Naming 31 Tscore 54 66   FAS 51 Tscore 60 84   Animal Naming 31 Tscore 70 98   VISUOSPATIAL FUNCTIONING Raw Score Type of Standardized Score Standardized Score Percentile/CP   RCFT Copy 33 - - >16   RCFT Time to Copy 105 - - >16   BVMT-R Copy 12 - - -   LEARNING & MEMORY Raw Score Type of Standardized Score Standardized Score Percentile/CP   HVLT-R       Total Immediate (6, 8, 10) 24 Tscore 40 16   Delayed Recall 8 Tscore 38 12   Retention % 80 Tscore 39 14   Hits 10 - - -   False Positives 2 - - -   Discrimination  8 Tscore 33 4   WMS-IV Subtests       LM I 19 ss 7 16   LM II 16 ss 8 25   LM Recognition 26 - - >75   BVMT-R       IR (5, 7, 9) 21 Tscore 43 25   DR 11 Tscore 59 82   Discrimination Index 5 - - 11-16   ATTENTION/WORKING MEMORY Raw Score Type of Standardized Score Standardized Score Percentile/CP   WAIS-IV Digit Span 29 ss 11 63         DS Forward 10 ss 10 50         DS Backward 9 ss 10 50         DS Sequence 10 ss 12 75         Longest Digit Forward 6 - - -         Longest Digit Backward 5 - - -         Longest Digit Sequence 6 - - -   MENTAL PROCESSING SPEED Raw Score Type of Standardized Score Standardized Score Percentile/CP   WAIS-IV PSI -  77   WAIS-IV Symbol Search 36 ss 12 75   WAIS-IV Coding 73 ss 12 75   TMT A  19 Tscore 64 92   TMT A errors 0 - - -   EXECUTIVE FUNCTIONING Raw Score Type of Standardized Score Standardized Score Percentile/CP   TMT B 60 Tscore 54 66   TMT B errors 0 - - -   WCST-64       Total Correct 52 SS - -   Total Errors 12 SS 96 39   Perseverative Resp. 6 SS 94 34   Perseverative Err. 6 SS 94 34   Nonperseverative Err. 6 SS 93 32   Concept. Level Response 50 SS 98 45   Categories Completed 4 - - >16   FMS 1 - - -   Learning to Learn 1.22 - - >16   MOOD & PERSONALITY Raw Score Type of Standardized Score Standardized Score Percentile/CP    BDI-2 23 - - -   STAISF:State 17 - - 58   STAISF:Trait 26 - - 97   ss = scaled score (mean = 10, SD = 3); SS = standard score (mean = 100, SD = 15); Tscore mean = 50, SD = 10; zscore (mean = 0.00, SD = 1)  It is important to note that scores/percentiles should only be interpreted by a neuropsychologist. It is common for healthy individuals to have 1-3 isolated low/unusual scores that are not indicative of any significant cognitive dysfunction.       BILLING  Code Description Minutes Units   62662 Psychiatric Interview 0    35697 Nubhvl xm phys/qhp 1st hr 0    39331 Nubhvl xm phy/qhp ea addl hr 0    54684 Psycl tst eval phys/qhp 1st 0    20991 Psycl tst eval phys/qhp ea 0    68179 Nrpsyc tst eval phys/qhp 1st 60 1   12307 Nrpsyc tst eval phys/qhp ea 97 2     Referral review/test selection 30      Tech consult/test review/modifications 10      Patient limitation management 0      Patient behavior management 0      Patient symptom monitoring 0      Record Review/Integration/Report Generation 86      Face-to-Face interpretive Feedback 31    55497 Psycl/nrpsyc tst phy/qhp 1st 0    42867 Psycl/nrpsyc tst phy/qhp ea 0    12840 Psycl/nrpsyc tech 1st 30 1   16823 Psycl/nrpsyc tst tech ea 107 4

## 2023-10-24 PROBLEM — F32.1 CURRENT MODERATE EPISODE OF MAJOR DEPRESSIVE DISORDER WITHOUT PRIOR EPISODE: Status: ACTIVE | Noted: 2023-10-24

## 2023-10-24 NOTE — PATIENT INSTRUCTIONS
"MINDFULNESS  Mindfulness is the basic human ability to be fully present, aware of where we are and what were doing, and not overly reactive or overwhelmed by whats going on around us. While mindfulness is something we all naturally possess, its more readily available to us when we practice on a daily basis. Whenever you bring awareness to what youre directly experiencing via your senses, or to your state of mind via your thoughts and emotions, youre being mindful. And theres growing research showing that when you train your brain to be mindful, youre actually remodeling the physical structure of your brain.    What is meditation?  Meditation is exploring. Its not a fixed destination. Your head doesnt become vacuumed free of thought, utterly undistracted. When we meditate we venture into the workings of our minds: our sensations (air blowing on our skin or a harsh smell wafting into the room), our emotions (love this, hate that, crave this, loathe that) and thoughts (wouldnt it be weird to see an elephant playing a trumpet).    Mindfulness meditation asks us to suspend judgment and unleash our natural curiosity about the workings of the mind, approaching our experience with warmth and kindness, to ourselves and others.    How do I practice mindfulness and meditation?  Mindfulness is available to us in every moment, whether through meditations and body scans, or mindful moment practices like taking time to pause and breathe when the phone rings instead of rushing to answer it. Reminding yourself to take notice of your thoughts, feelings, body sensations and the world around you is the first step to mindfulness.    Notice the everyday  "Even as we go about our daily lives, we can notice the sensations of things, the food we eat, the air moving past the body as we walk," says Professor Eaton. "All this may sound very small, but it has huge power to interrupt the 'autopilot' mode we often engage day to day, " "and to give us new perspectives on life."    Keep it regular  It can be helpful to pick a regular time - the morning journey to work or a walk at lunchtime - during which you decide to be aware of the sensations created by the world around you.    Try something new  Trying new things, such as sitting in a different seat in meetings or going somewhere new for lunch, can also help you notice the world in a new way.    Watch your thoughts  "Some people find it very difficult to practice mindfulness. As soon as they stop what they're doing, lots of thoughts and worries crowd in," says Professor Eaton.    "It might be useful to remember that mindfulness isn't about making these thoughts go away, but rather about seeing them as mental events.    "Imagine standing at a bus station and seeing 'thought buses' coming and going without having to get on them and be taken away. This can be very hard at first, but with gentle persistence it is possible.    "Some people find that it is easier to cope with an over-busy mind if they are doing gentle yoga or walking."    Name thoughts and feelings  To develop an awareness of thoughts and feelings, some people find it helpful to silently name them: "Here's the thought that I might fail that exam". Or, "This is anxiety".    Free yourself from the past and future  You can practise mindfulness anywhere, but it can be especially helpful to take a mindful approach if you realise that, for several minutes, you have been "trapped" in reliving past problems or "pre-living" future worries.    Different mindfulness practices  As well as practising mindfulness in daily life, it can be helpful to set aside time for a more formal mindfulness practice.    Mindfulness meditation involves sitting silently and paying attention to thoughts, sounds, the sensations of breathing or parts of the body, bringing your attention back whenever the mind starts to wander.    Yoga and jaxon-chi can also help with " developing awareness of your breathing.    Mindfulness Resources:  www.mindful.org  HeadSpace Joe (free access to HeadSpace Plus in 2020 for healthcare providers with an NPI number)  Calm Joe    Mindfulness Books:  Mindfulness for Beginners, by Jarrell Mendez  The Mindful Way Through Anxiety, by Arianna Ruiz  The Little Book of Mindfulness, by Vannessa Ritter    Material adapted from: https://www.nhs.uk/conditions/stress-anxiety-depression/mindfulness/ and mindful.org    PRACTICE GOOD COGNITIVE HYGIENE  Engage in regular exercise, which increases alertness and arousal and can improve attention and focus.  Consider lower impact exercises, such as yoga or light walking. Try to exercise for at least 150 minutes per week  Get a good night's sleep, as this can enhance alertness and cognition.  Eat healthy foods and balanced meals. It is notable that research indicates certain nutrients may aid in brain function, such as B vitamins (especially B6, B12, and folic acid), antioxidants (such as vitamins C and E, and beta carotene), and Omega-3 fatty acids. Here are some common tips for diet (Adopted from Trena et al, [place], 2018):  Eat primarily plant-based foods, such as fruits and vegetables, whole grains, legumes   (beans) and nuts.  Limit refined carbohydrates (white pasta, bread, rice).  Replace butter with healthy fats such as olive oil.  Use herbs and spices instead of salt to flavor foods.  Limit red meat and processed meats to no more than a few times a month.  Avoid sugary sodas, bakery goods, and sweets.  Eat fish and poultry at least twice a week.  Keep your brain active. Find activities to stay mentally active, such as reading, games (cards, checkers), puzzles (crosswords, Sudoku, jig saw), crafts (models, woodworking), gardening, or participating in activities in the community.  Stay socially engaged. Continue staying active with your family and friends.    RESOURCE BOOKS & POD  CASTS  Consider purchasing the following books on brain health:   High-Octane Brain: 5 Science-Based Steps to Sharpen Your Memory and Reduce Your Risk of Alzheimer's by Carla Fair, PhD, ABPP-CN.   Keep Your Wits About You: The Science of Brain Maintenance As You Age by Juanis Roberts, PhD.   The Brain Health Book: Using the Power of Neuroscience to Improve Your Life by Sony Batista, PhD, ABPP-CN.     Consider listening to Brain Beat, a pod cast series by the National Academy of Neuropsychology Foundation featuring discussions with experts on brain health and functioning.     BRAIN TRAINING APPS  · BrainHQ (https://www.brainHaveMyShift.[employer]/) - BrainHQ has more than two dozen brain-training exercises organized into six categories: Attention, Brain Speed, Memory, People Skills, Intelligence, and Navigation. It allows you to fit brain exercises into your busy life, and access brain training on most internet-connected devices. Plus, each exercise continuously adapts to your unique performance. So you train at the right level for you.     · Mind Games (https://www.mindgames.com/) - Mind Games is a great collection of games based in part on principles derived from cognitive tasks to help you practice different mental skills. This includes a handful of free games. Additionally, there are a number of trial games included that can be played 3 times. All games include your score history and a graph of your progress. Using some principles of standardized testing, your scores are also converted to a standard scale so that you can see where you need work and excel. The training center does the work for you by picking the perfect mix of exercises to keep you engaged.     · Elevate (https://Swiftypeapp.com/) - Elevate was selected by [employer] as Joe of the Year! Elevate is a brain training program designed to improve focus, speaking abilities, processing speed, memory, math skills, and more. Each person is provided with a personalized  training program that adjusts over time to maximize results. Theoretically, the more you train with Elevate, the more you'll improve critical cognitive skills that are proven to boost productivity, earning power, and self-confidence. Users who train at least 3 times per week have reported dramatic gains and increased confidence. In-sydni purchases.     · Peak (https://www.SoloLearn.net/) - Reach Peak performance with over 40 unique games, each one developed by neuroscientists and game experts to challenge your cognitive skills and push you further. Use , the  for your brain, to find the right workout for you at the right time. Choose from 's best recommendations to push your skills to the max. Or take contextual workouts like Coffee Break if you're short on time.  will help you track your progress using in-depth insights and keep you going when you need it most. Play for free or upgrade to Pro and get the best brain training experience available. In-sydni purchases.     OTHER SUGGESTIONS  Games and Apps like Sudoku; Crossword Puzzles; Word Find; Memory Games; Logic Games; Solitaire; and Hidden Object Mysteries. Find some you like and play them. It will exercise many of the skills necessary to improve function.    COGNITIVE TIPS AND STRATEGIES  The following tips and strategies are provided to help assist in daily activities:      Attention: Remember that inattention and lack of focus are major culprits to forgetting information so be sure and practice paying attention for adequate learning of information. If you rely on passive attention to remembering something (e.g., yeah, uh-huh approach), you'll find you cannot recall it later. I recommend the following to improve attention, which may aid in later recall:  1. Reduce distractions in the area as much as possible  2. Look at the person as they are speaking to you.   3. Paraphrase as they are speaking  4. Write down important pieces of  information   5. Ask them to repeat if you zone out.  6. Have them simplify and reduce information that you need to attend to during conversation.  7. Have visual cues to remind you if you need to do something later.     Processing Speed:  1. Using multiple modalities (e.g., listening, writing notes, asking questions, recording) to learn new information is likely to allow additional time for processing, thus improving memory for the material.   2. Allowing sufficient time to complete tasks will reduce frustration and help to ensure completion.     Executive Functionin. Don't attempt to multi-task.  Separate tasks so that each can be completed one at a time  2. Consider using a calendar/day planner, as that may be effective to help you plan and stay on track.  Color-coding specific tasks by importance may add additional benefit to your planner  3. Break down large projects into smaller tasks and write down the steps to completing the task.  Taking notes while reading can help with recall.     Storing Information: Use the below strategies to help you further enhance how information is stored  1. Rehearse - Immediately after seeing/hearing something, try to recall it.  Wait a few minutes, then check again.  Gradually lengthen the intervals between rehearsals.  2. Repetition of learned material is critical to ensure storage of information to be learned. Self-test at home to ensure learning.  3. Write down important information to improve your attention and focus and to have something to look back on when you need to recall it.  4. Make sure the person doesn't rattle off, but presents in a clear, logical, and unhurried manner.      Recalling Information:  1. Jog your memory - Lose something?  Think back to when you last had it.  What did you do next?  And after that?  Mentally walk yourself through each activity that followed.  Prodding your memory this way may enable you to recall the location of the missing  item.  2. Use a cue - Symbolic reminders (the proverbial string around the finger) are helpful.  So too are memos, timers, calendar notes, etc.--keep them in visible, appropriate place  3. Get organized - Have fixed locations for all important papers, key phone numbers, medications, keys, wallet, glasses, tools, etc.  4. Develop routines - Routines can anchor memories so they do not drift away.       Word Finding:   Not being able to find a word when you need it is a common and very annoying problem. It is not strictly a memory issue, but more a filing and retrieval issue. You know the word or name you want, but it cannot be found in your brain file. It is like having all the files in your file cabinet emptied on the floor. Every piece of information is there but finding it can be a challenge.   One strategy that may help is working with a speech pathologist/therapist to learn techniques to decrease word finding problems. Some of those strategies/techniques include the following:  Use circumlocutions. Describe the object you're trying to name instead of naming it.  Recite you're A, B, Cs. Go through the alphabet to see if a letter triggers finding the word.  Picture it. Try to visualize the spelling or writing of the word.  Relax. The harder you try to force yourself to come up with the word, the more frustrated you get and the worse you function.   Write it down. In situations where using correct words is critical, such as communicating on the radio while flying, write down the key words so that they are in front of you if needed.  Use word association. For words or names you continually misfile and can't find, try to come up with a word association, something that reminds you of the word or name.  Write a script. Try scripting something important that you want to say. Either write it out or practice it beforehand, so that you get it right.  Play word games. Doing crossword puzzles and playing word finding  games may help your brain become more efficient at filing and retrieving words.   yes...

## 2023-10-26 ENCOUNTER — OFFICE VISIT (OUTPATIENT)
Dept: NEUROLOGY | Facility: CLINIC | Age: 48
End: 2023-10-26
Payer: COMMERCIAL

## 2023-10-26 ENCOUNTER — PATIENT MESSAGE (OUTPATIENT)
Dept: NEUROLOGY | Facility: CLINIC | Age: 48
End: 2023-10-26

## 2023-10-26 DIAGNOSIS — R41.9 COGNITIVE COMPLAINTS: Primary | ICD-10-CM

## 2023-10-26 DIAGNOSIS — F90.9 ATTENTION DEFICIT HYPERACTIVITY DISORDER (ADHD), UNSPECIFIED ADHD TYPE: ICD-10-CM

## 2023-10-26 DIAGNOSIS — F41.1 GAD (GENERALIZED ANXIETY DISORDER): ICD-10-CM

## 2023-10-26 DIAGNOSIS — F32.1 CURRENT MODERATE EPISODE OF MAJOR DEPRESSIVE DISORDER WITHOUT PRIOR EPISODE: ICD-10-CM

## 2023-10-26 PROCEDURE — 99499 UNLISTED E&M SERVICE: CPT | Mod: 95,,, | Performed by: CLINICAL NEUROPSYCHOLOGIST

## 2023-10-26 PROCEDURE — 99499 NO LOS: ICD-10-PCS | Mod: 95,,, | Performed by: CLINICAL NEUROPSYCHOLOGIST

## 2024-01-11 DIAGNOSIS — Z00.00 ENCOUNTER FOR MEDICARE ANNUAL WELLNESS EXAM: ICD-10-CM

## 2024-02-01 NOTE — PROGRESS NOTES
----- Message from Pretty Arreguin sent at 2/1/2024  9:51 AM CST -----  Contact: Rani/ [facility]  Rani/ John [phone], please fax [phone]              Thanks      NEUROPSYCHOLOGICAL EVALUATION FEEDBACK    TELEMEDICINE DETAILS:   The patient location is: [place]  The chief complaint leading to consultation is: feedback regarding neuropsychological test results  Visit type: Virtual visit with synchronous audio and video  Total time spent with patient: 31 minutes  Each patient to whom he or she provides medical services by telemedicine is: (1) informed of the relationship between the physician and patient and the respective role of any other health care provider with respect to management of the patient; and (2) notified that he or she may decline to receive medical services by telemedicine and may withdraw from such care at any time.  Notes: See below.    Luis Daniel Wynne Jr. attended a feedback session today. We discussed the results of the neuropsychological evaluation and I gave time to discuss questions and concerns. For full evaluation details, please see the note from this provider dated 10/10/2023. A copy of the report was provided via [employer].     Problem List Items Addressed This Visit          Psychiatric    Attention deficit hyperactivity disorder (ADHD)    Current moderate episode of major depressive disorder without prior episode    DAVY (generalized anxiety disorder)     Other Visit Diagnoses       Cognitive complaints    -  Primary            Jessica Araujo Psy.D.  Postdoctoral Fellow in Clinical Neuropsychology  [facility] - Department of Neurology      Carley Goodson, PhD  Licensed Clinical Neuropsychologist  [facility] - Department of Neurology

## 2024-03-04 ENCOUNTER — PATIENT MESSAGE (OUTPATIENT)
Dept: INTERNAL MEDICINE | Facility: CLINIC | Age: 49
End: 2024-03-04
Payer: COMMERCIAL

## 2024-03-11 ENCOUNTER — OFFICE VISIT (OUTPATIENT)
Dept: INTERNAL MEDICINE | Facility: CLINIC | Age: 49
End: 2024-03-11
Payer: COMMERCIAL

## 2024-03-11 DIAGNOSIS — M79.7 FIBROMYALGIA: Primary | ICD-10-CM

## 2024-03-11 PROCEDURE — 99213 OFFICE O/P EST LOW 20 MIN: CPT | Mod: 95,,, | Performed by: INTERNAL MEDICINE

## 2024-03-11 PROCEDURE — 1160F RVW MEDS BY RX/DR IN RCRD: CPT | Mod: CPTII,95,, | Performed by: INTERNAL MEDICINE

## 2024-03-11 PROCEDURE — 1159F MED LIST DOCD IN RCRD: CPT | Mod: CPTII,95,, | Performed by: INTERNAL MEDICINE

## 2024-03-11 NOTE — PROGRESS NOTES
The patient location is: [place]  The chief complaint leading to consultation is: followup of fibromyalgia    Visit type: audiovisual    Face to Face time with patient: 26   minutes of total time spent on the encounter, which includes face to face time and non-face to face time preparing to see the patient (eg, review of tests), Obtaining and/or reviewing separately obtained history, Documenting clinical information in the electronic or other health record, Independently interpreting results (not separately reported) and communicating results to the patient/family/caregiver, or Care coordination (not separately reported).         Each patient to whom he or she provides medical services by telemedicine is:  (1) informed of the relationship between the physician and patient and the respective role of any other health care provider with respect to management of the patient; and (2) notified that he or she may decline to receive medical services by telemedicine and may withdraw from such care at any time.    Notes:   CC: followup of fibromyalgia  HPI:  The patient is a 48 y.o. year old male who presents to the office for followup of fibromyalgia.  He requests completion of disability paper work.  He reports persistent diffuse bodyaches.  He reports difficulty standing and sitting for prolonged periods of time.  He can also not perform heavy lifting, greater than 30 lbs.  Pain limits his ability to stoop and bend.    PAST MEDICAL HISTORY:  Past Medical History:   Diagnosis Date    Depression     Fibromyalgia     Marijuana use     Peptic ulcer     Scoliosis        SURGICAL HISTORY:  Past Surgical History:   Procedure Laterality Date    COLONOSCOPY N/A 4/5/2023    Procedure: COLONOSCOPY sutab;  Surgeon: Angelo Salgado MD;  Location: [facility];  Service: Endoscopy;  Laterality: N/A;    ESOPHAGOGASTRODUODENOSCOPY N/A 4/5/2023    Procedure: EGD (ESOPHAGOGASTRODUODENOSCOPY);  Surgeon: Angelo Salgado MD;  Location: [facility]  ENDO;  Service: Endoscopy;  Laterality: N/A;    KNEE ARTHROSCOPY         MEDS:  Medcard reviewed and updated    ALLERGIES: Allergy Card reviewed and updated    SOCIAL HISTORY:   The patient is a nonsmoker.    PE:   APPEARANCE: Well nourished, well developed, in no Virtual.  PSYCHIATRIC: The patient is oriented to person, place, and time and has a pleasant affect.        ASSESSMENT/PLAN:  Diagnoses and all orders for this visit:    Fibromyalgia  -   stable  -   FMLA paperwork completed

## 2024-03-13 ENCOUNTER — PATIENT MESSAGE (OUTPATIENT)
Dept: INTERNAL MEDICINE | Facility: CLINIC | Age: 49
End: 2024-03-13
Payer: COMMERCIAL

## 2024-03-19 ENCOUNTER — OFFICE VISIT (OUTPATIENT)
Dept: NEUROLOGY | Facility: CLINIC | Age: 49
End: 2024-03-19
Payer: COMMERCIAL

## 2024-03-19 DIAGNOSIS — E53.8 B12 DEFICIENCY: ICD-10-CM

## 2024-03-19 DIAGNOSIS — R79.89 LOW TESTOSTERONE: ICD-10-CM

## 2024-03-19 DIAGNOSIS — R41.3 MEMORY LOSS: ICD-10-CM

## 2024-03-19 DIAGNOSIS — R94.01 ABNORMAL EEG: ICD-10-CM

## 2024-03-19 DIAGNOSIS — F39 MOOD DISORDER: ICD-10-CM

## 2024-03-19 DIAGNOSIS — R56.9 SEIZURE-LIKE ACTIVITY: Primary | ICD-10-CM

## 2024-03-19 PROCEDURE — 99214 OFFICE O/P EST MOD 30 MIN: CPT | Mod: 95,,, | Performed by: PSYCHIATRY & NEUROLOGY

## 2024-03-19 PROCEDURE — 1160F RVW MEDS BY RX/DR IN RCRD: CPT | Mod: CPTII,95,, | Performed by: PSYCHIATRY & NEUROLOGY

## 2024-03-19 PROCEDURE — 1159F MED LIST DOCD IN RCRD: CPT | Mod: CPTII,95,, | Performed by: PSYCHIATRY & NEUROLOGY
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
## 2024-04-08 ENCOUNTER — PATIENT MESSAGE (OUTPATIENT)
Dept: UROLOGY | Facility: CLINIC | Age: 49
End: 2024-04-08
Payer: COMMERCIAL

## 2024-04-09 ENCOUNTER — TELEPHONE (OUTPATIENT)
Dept: UROLOGY | Facility: CLINIC | Age: 49
End: 2024-04-09
Payer: COMMERCIAL

## 2024-04-09 ENCOUNTER — PATIENT MESSAGE (OUTPATIENT)
Dept: UROLOGY | Facility: CLINIC | Age: 49
End: 2024-04-09
Payer: COMMERCIAL

## 2024-05-07 ENCOUNTER — PATIENT MESSAGE (OUTPATIENT)
Dept: UROLOGY | Facility: CLINIC | Age: 49
End: 2024-05-07
Payer: MEDICARE

## 2024-05-21 ENCOUNTER — TELEPHONE (OUTPATIENT)
Dept: NEUROLOGY | Facility: CLINIC | Age: 49
End: 2024-05-21
Payer: MEDICARE

## 2024-05-21 RX ORDER — LAMOTRIGINE 25 MG/1
TABLET ORAL
Qty: 120 TABLET | Refills: 11 | Status: SHIPPED | OUTPATIENT
Start: 2024-05-21

## 2024-05-21 NOTE — TELEPHONE ENCOUNTER
Notify patient: Although he did not have any spells during the EEG monitoring, the results show  a seizure focus like we have seen prior. This indicates the potential for epileptic seizures. I suggest a trial of another anti-seizure medication. I would recommend one that could be easily tolerated and not the Vimpat or Keppra he tried prior. I am sending in Lamictal per orders. Watch for a rash or any other side effects. We will start low dose.       MD note: 117 hour home EEG: fairly frequent epileptiform discharges in either fronta-temporal region indicative of partial onset seizures.  Mild right slowing also noted    Read by Dr MORTEZA Richardson

## 2024-06-18 ENCOUNTER — OFFICE VISIT (OUTPATIENT)
Dept: NEUROLOGY | Facility: CLINIC | Age: 49
End: 2024-06-18
Payer: MEDICARE

## 2024-06-18 DIAGNOSIS — R56.9 SEIZURE-LIKE ACTIVITY: Primary | ICD-10-CM

## 2024-06-18 DIAGNOSIS — R94.01 ABNORMAL EEG: ICD-10-CM

## 2024-06-18 DIAGNOSIS — R41.3 MEMORY LOSS: ICD-10-CM

## 2024-06-18 DIAGNOSIS — E53.8 B12 DEFICIENCY: ICD-10-CM

## 2024-06-18 PROCEDURE — 99214 OFFICE O/P EST MOD 30 MIN: CPT | Performed by: PSYCHIATRY & NEUROLOGY

## 2024-06-18 PROCEDURE — 99999 PR STA SHADOW: CPT | Mod: PBBFAC,95,, | Performed by: PSYCHIATRY & NEUROLOGY
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
## 2024-06-20 ENCOUNTER — OFFICE VISIT (OUTPATIENT)
Dept: INTERNAL MEDICINE | Facility: CLINIC | Age: 49
End: 2024-06-20
Payer: MEDICARE

## 2024-06-20 VITALS
HEART RATE: 65 BPM | DIASTOLIC BLOOD PRESSURE: 88 MMHG | SYSTOLIC BLOOD PRESSURE: 124 MMHG | HEIGHT: 70 IN | OXYGEN SATURATION: 96 % | RESPIRATION RATE: 18 BRPM | BODY MASS INDEX: 26.14 KG/M2 | WEIGHT: 182.56 LBS

## 2024-06-20 DIAGNOSIS — R56.9 SEIZURE: ICD-10-CM

## 2024-06-20 DIAGNOSIS — Z00.00 ENCOUNTER FOR MEDICARE ANNUAL WELLNESS EXAM: ICD-10-CM

## 2024-06-20 DIAGNOSIS — E53.8 B12 DEFICIENCY DUE TO DIET: ICD-10-CM

## 2024-06-20 DIAGNOSIS — Z00.00 ENCOUNTER FOR PREVENTIVE HEALTH EXAMINATION: Primary | ICD-10-CM

## 2024-06-20 DIAGNOSIS — M47.816 LUMBAR SPONDYLOSIS: ICD-10-CM

## 2024-06-20 DIAGNOSIS — F32.1 CURRENT MODERATE EPISODE OF MAJOR DEPRESSIVE DISORDER WITHOUT PRIOR EPISODE: ICD-10-CM

## 2024-06-20 DIAGNOSIS — F90.9 ATTENTION DEFICIT HYPERACTIVITY DISORDER (ADHD), UNSPECIFIED ADHD TYPE: ICD-10-CM

## 2024-06-20 DIAGNOSIS — M79.7 FIBROMYALGIA: ICD-10-CM

## 2024-06-20 PROBLEM — F17.200 NICOTINE ADDICTION: Status: RESOLVED | Noted: 2022-06-15 | Resolved: 2024-06-20

## 2024-06-20 PROBLEM — Z72.0 TOBACCO USE: Status: RESOLVED | Noted: 2023-06-02 | Resolved: 2024-06-20

## 2024-06-20 PROBLEM — Z87.891 FORMER SMOKER: Status: ACTIVE | Noted: 2024-06-20

## 2024-06-20 PROCEDURE — 99999 PR STA SHADOW: CPT | Mod: PBBFAC,,, | Performed by: NURSE PRACTITIONER

## 2024-06-20 PROCEDURE — 99213 OFFICE O/P EST LOW 20 MIN: CPT | Mod: PBBFAC | Performed by: NURSE PRACTITIONER

## 2024-06-20 PROCEDURE — G0439 PPPS, SUBSEQ VISIT: HCPCS | Performed by: NURSE PRACTITIONER

## 2024-06-20 PROCEDURE — 99999 PR PBB SHADOW E&M-EST. PATIENT-LVL III: CPT | Mod: PBBFAC,,, | Performed by: NURSE PRACTITIONER

## 2024-06-20 NOTE — PATIENT INSTRUCTIONS
Counseling and Referral of Other Preventative  (Italic type indicates deductible and co-insurance are waived)    Patient Name: Luis Daniel Wynne  Today's Date: 6/20/2024    Health Maintenance       Date Due Completion Date    Pneumococcal Vaccines (Age 0-64) (1 of 2 - PCV) Never done ---    TETANUS VACCINE Never done ---    Hemoglobin A1c (Diabetic Prevention Screening) 12/01/2014 12/1/2011    COVID-19 Vaccine (1 - 2023-24 season) Never done ---    Influenza Vaccine (Season Ended) 09/01/2024 11/15/2022 (Declined)    Override on 11/15/2022: Declined (Declined for season)    Lipid Panel 06/15/2027 6/15/2022    Colorectal Cancer Screening 04/05/2028 4/5/2023        No orders of the defined types were placed in this encounter.      The following information is provided to all patients.  This information is to help you find resources for any of the problems found today that may be affecting your health:                  Living healthy guide: www.[facility].[place].[facility]      Understanding Diabetes: www.diabetes.org      Eating healthy: www.cdc.gov/healthyweight      CDC home safety checklist: www.cdc.gov/steadi/patient.html      Agency on Aging: www.goea.[place].gov      Alcoholics anonymous (AA): www.aa.org      Physical Activity: www.jeanie.nih.gov/cm9jwoi      Tobacco use: www.quitwithusla.org

## 2024-06-20 NOTE — Clinical Note
Mr. Wynne  was seen today for AMW. Health maintenance reviewed.  Overdue health maintenance shows indications  for PCV, Td,  Covid and HGBA1c.  LA  reviewed; Patient is not using or prescribed any Opioids Exam stable.  ACP documents reviewed and provided.  He reports his wife has power of  forms and will provide copies to PCP.  Thank You,  Grace LÓPEZ
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
## 2024-06-20 NOTE — Clinical Note
Mr. Wynne  was seen today for AMW. Health maintenance reviewed.  Overdue health maintenance shows indications  for PCV, Td,  Covid and HGBA1c. He declines all vaccines. Glucose has been normal; defer HGBA A1c to PCP for next labs.  LA  reviewed; Patient is not using or prescribed any Opioids Exam stable.  ACP documents reviewed and provided.  He reports his wife has power of  forms and will provide copies to PCP.  Thank You,   Grace LÓPEZ

## 2024-06-24 ENCOUNTER — TELEPHONE (OUTPATIENT)
Dept: INTERNAL MEDICINE | Facility: CLINIC | Age: 49
End: 2024-06-24
Payer: MEDICARE

## 2024-06-24 ENCOUNTER — OFFICE VISIT (OUTPATIENT)
Dept: INTERNAL MEDICINE | Facility: CLINIC | Age: 49
End: 2024-06-24
Payer: MEDICARE

## 2024-06-24 DIAGNOSIS — R79.89 LOW TESTOSTERONE: ICD-10-CM

## 2024-06-24 DIAGNOSIS — G47.00 INSOMNIA, UNSPECIFIED TYPE: ICD-10-CM

## 2024-06-24 DIAGNOSIS — G40.909 SEIZURE DISORDER: Primary | ICD-10-CM

## 2024-06-24 DIAGNOSIS — E53.8 B12 DEFICIENCY DUE TO DIET: ICD-10-CM

## 2024-06-24 PROCEDURE — 99214 OFFICE O/P EST MOD 30 MIN: CPT | Mod: 95,,, | Performed by: INTERNAL MEDICINE

## 2024-06-24 NOTE — PROGRESS NOTES
The patient location is: [place]  The chief complaint leading to consultation is: follow up of seizures and fibromyalgia    Visit type: audiovisual    Face to Face time with patient: 18   minutes of total time spent on the encounter, which includes face to face time and non-face to face time preparing to see the patient (eg, review of tests), Obtaining and/or reviewing separately obtained history, Documenting clinical information in the electronic or other health record, Independently interpreting results (not separately reported) and communicating results to the patient/family/caregiver, or Care coordination (not separately reported).         Each patient to whom he or she provides medical services by telemedicine is:  (1) informed of the relationship between the physician and patient and the respective role of any other health care provider with respect to management of the patient; and (2) notified that he or she may decline to receive medical services by telemedicine and may withdraw from such care at any time.    Notes:   CC: followup of seizures and fibromyalgia  HPI:  The patient is a 48 y.o. year old male who presents to the office for followup of seizures/ fibromyalgia.  He recently had an EEG, which he reports showed increased epileptic activity in the frontal lobe.  He reports his symptoms are improving   He reports back pain has resolved. The patient denies any chest pain, shortness of breath, headache, blurred vision, excessive fatigue, nausea or vomiting.      PAST MEDICAL HISTORY:  Past Medical History:   Diagnosis Date    Depression     Fibromyalgia     Marijuana use     Nicotine addiction 06/15/2022    Uses nicorette lozenges       Peptic ulcer     Scoliosis     Tobacco use 06/02/2023       SURGICAL HISTORY:  Past Surgical History:   Procedure Laterality Date    COLONOSCOPY N/A 4/5/2023    Procedure: COLONOSCOPY sutab;  Surgeon: Angelo Salgado MD;  Location: [facility];  Service: Endoscopy;   Laterality: N/A;    ESOPHAGOGASTRODUODENOSCOPY N/A 4/5/2023    Procedure: EGD (ESOPHAGOGASTRODUODENOSCOPY);  Surgeon: Angelo Salgaod MD;  Location: [facility];  Service: Endoscopy;  Laterality: N/A;    KNEE ARTHROSCOPY         MEDS:  Medcard reviewed and updated    ALLERGIES: Allergy Card reviewed and updated    SOCIAL HISTORY:   The patient is a nonsmoker.    PE:   APPEARANCE: Well nourished, well developed, in no acute distress.    Video visit  PSYCHIATRIC: The patient is oriented to person, place, and time and has a pleasant affect.        ASSESSMENT/PLAN:  Diagnoses and all orders for this visit:    Seizure disorder  -     CBC Auto Differential; Future  -     Comprehensive Metabolic Panel; Future  -     Lipid Panel; Future  -     TSH; Future  -     Hemoglobin A1C; Future  -     Urinalysis; Future  -     Vitamin B12; Future  -     TESTOSTERONE PANEL; Future    B12 deficiency due to diet  -     CBC Auto Differential; Future  -     Comprehensive Metabolic Panel; Future  -     Lipid Panel; Future  -     TSH; Future  -     Hemoglobin A1C; Future  -     Urinalysis; Future  -     Vitamin B12; Future  -     TESTOSTERONE PANEL; Future    Low testosterone  -     CBC Auto Differential; Future  -     Comprehensive Metabolic Panel; Future  -     Lipid Panel; Future  -     TSH; Future  -     Hemoglobin A1C; Future  -     Urinalysis; Future  -     Vitamin B12; Future  -     TESTOSTERONE PANEL; Future    Insomnia, unspecified type  -     TSH; Future

## 2024-07-01 ENCOUNTER — PATIENT MESSAGE (OUTPATIENT)
Dept: INTERNAL MEDICINE | Facility: CLINIC | Age: 49
End: 2024-07-01
Payer: MEDICARE

## 2024-07-05 NOTE — TELEPHONE ENCOUNTER
Called [facility] to see if they could fax the result. They stated they were unable to find result and or the pt.

## 2024-07-05 NOTE — TELEPHONE ENCOUNTER
called [facility] lab to check the status. They state the Testosterone lab could take 5-8 buisness days to result. If not resulted by today then by next week we should have the result.

## 2024-07-22 RX ORDER — LAMOTRIGINE 100 MG/1
100 TABLET ORAL 2 TIMES DAILY
COMMUNITY
End: 2024-07-22 | Stop reason: SDUPTHER

## 2024-07-22 RX ORDER — LAMOTRIGINE 100 MG/1
100 TABLET ORAL 2 TIMES DAILY
Qty: 60 TABLET | Refills: 5 | Status: SHIPPED | OUTPATIENT
Start: 2024-07-22

## 2024-07-22 NOTE — TELEPHONE ENCOUNTER
----- Message from Ruthie Rizo sent at 2024  9:52 AM CDT -----  Contact: Patient  Luis Daniel Wynne Jr.  MRN: [id number]  : 1975  PCP: Zenobia Dunbar  Home Phone      [phone]  Work Phone      Not on file.  Mobile          [phone]      MESSAGE: Patient is requesting a new and updated prescription for Lamotrigine 100 mg instead of 25 mg. He runs out of medication today and would like it sent to [facility]. Please return this call    PHONE; [phone]

## 2024-09-05 ENCOUNTER — OFFICE VISIT (OUTPATIENT)
Dept: NEUROLOGY | Facility: CLINIC | Age: 49
End: 2024-09-05
Payer: MEDICARE

## 2024-09-05 DIAGNOSIS — R00.0 RACING HEART BEAT: ICD-10-CM

## 2024-09-05 DIAGNOSIS — E53.8 B12 DEFICIENCY: ICD-10-CM

## 2024-09-05 DIAGNOSIS — F39 MOOD DISORDER: ICD-10-CM

## 2024-09-05 DIAGNOSIS — R56.9 SEIZURE-LIKE ACTIVITY: Primary | ICD-10-CM

## 2024-09-05 DIAGNOSIS — R41.3 MEMORY LOSS: ICD-10-CM

## 2024-09-05 DIAGNOSIS — R94.01 ABNORMAL EEG: ICD-10-CM

## 2024-09-05 PROCEDURE — 99999 PR STA SHADOW: CPT | Mod: PBBFAC,95,, | Performed by: PSYCHIATRY & NEUROLOGY

## 2024-09-05 PROCEDURE — 99214 OFFICE O/P EST MOD 30 MIN: CPT | Performed by: PSYCHIATRY & NEUROLOGY

## 2024-09-05 RX ORDER — LAMOTRIGINE 25 MG/1
25 TABLET ORAL DAILY
Qty: 60 TABLET | Refills: 11 | Status: SHIPPED | OUTPATIENT
Start: 2024-09-05 | End: 2025-09-05
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
## 2024-09-18 ENCOUNTER — OFFICE VISIT (OUTPATIENT)
Dept: CARDIOLOGY | Facility: CLINIC | Age: 49
End: 2024-09-18
Payer: MEDICARE

## 2024-09-18 ENCOUNTER — PATIENT OUTREACH (OUTPATIENT)
Dept: ADMINISTRATIVE | Facility: HOSPITAL | Age: 49
End: 2024-09-18
Payer: COMMERCIAL

## 2024-09-18 VITALS
HEART RATE: 76 BPM | SYSTOLIC BLOOD PRESSURE: 124 MMHG | HEIGHT: 70 IN | WEIGHT: 179.88 LBS | BODY MASS INDEX: 25.75 KG/M2 | DIASTOLIC BLOOD PRESSURE: 82 MMHG

## 2024-09-18 DIAGNOSIS — R56.9 SEIZURE: ICD-10-CM

## 2024-09-18 DIAGNOSIS — R68.89 SPELLS OF DECREASED ATTENTIVENESS: ICD-10-CM

## 2024-09-18 DIAGNOSIS — R40.20 LOC (LOSS OF CONSCIOUSNESS): ICD-10-CM

## 2024-09-18 DIAGNOSIS — R07.9 CHEST PAIN, UNSPECIFIED TYPE: Primary | ICD-10-CM

## 2024-09-18 DIAGNOSIS — R55 VASOVAGAL EPISODE: ICD-10-CM

## 2024-09-18 DIAGNOSIS — R00.2 PALPITATIONS: Primary | ICD-10-CM

## 2024-09-18 DIAGNOSIS — R00.0 RACING HEART BEAT: ICD-10-CM

## 2024-09-18 DIAGNOSIS — R07.9 CHEST PAIN, UNSPECIFIED TYPE: ICD-10-CM

## 2024-09-18 PROCEDURE — 99999 EKG 12-LEAD: CPT | Mod: PBBFAC,,, | Performed by: INTERNAL MEDICINE

## 2024-09-18 PROCEDURE — 99213 OFFICE O/P EST LOW 20 MIN: CPT | Mod: PBBFAC | Performed by: INTERNAL MEDICINE

## 2024-09-18 PROCEDURE — 99999 PR PBB SHADOW E&M-EST. PATIENT-LVL III: CPT | Mod: PBBFAC,,, | Performed by: INTERNAL MEDICINE

## 2024-09-18 PROCEDURE — 99204 OFFICE O/P NEW MOD 45 MIN: CPT | Mod: S$PBB | Performed by: INTERNAL MEDICINE

## 2024-09-18 PROCEDURE — 99999 PR STA SHADOW: CPT | Mod: PBBFAC,,, | Performed by: INTERNAL MEDICINE

## 2024-09-18 PROCEDURE — 93005 ELECTROCARDIOGRAM TRACING: CPT | Mod: PBBFAC | Performed by: INTERNAL MEDICINE

## 2024-09-18 NOTE — ASSESSMENT & PLAN NOTE
Occurring more than 2 years ago.  Workup negative.  It was signed out as a possible seizure.  Echocardiogram ordered.

## 2024-09-18 NOTE — ASSESSMENT & PLAN NOTE
Reported intermittently.  A lot of the racing heart complaints he reports are related to position changes.  Holter monitor ordered.  Primary arrhythmia unlikely based on symptoms.

## 2024-09-18 NOTE — PROGRESS NOTES
[facility] Cardiology     Subjective:    Patient ID:  Luis Daniel Wynne Jr. is a 48 y.o. male who presents for evaluation of Dizziness, Chest Pain, Loss of Consciousness, and Palpitations    Review of patient's allergies indicates:  No Known Allergies  The patient is following with Neurology and Gastroenterology with a long history of seizure-like events.  He is referred for episodes of lightheadedness with standing.  He has had syncope.  There are some features of vasovagal events.  He is not on therapy for hypertension.    He has never seen a cardiologist.  Recently vital signs have been checked at the house and he has had borderline high blood pressure readings but no low readings on blood pressure.  His episode of syncope was 3 years ago.    He states that he had chest pain when he was on a hunting trip.  It lasted 3 minutes and resolved.  He currently does not work.  He is a former cigarette smoker.  He has a history of fibromyalgia.    Currently, his symptoms have lessened such that about 1 week out of 4 he is with witnessed seizure-like activity and significant debility.  He states that for an week he can not eat anything when he is symptomatic.  There is a lot of chronic gastrointestinal symptomatology.  His workup is negative and the gastroenterologist informed him that they can not find anything wrong to explain his GI symptoms.  He states there is no family history of heart problems.    Today's electrocardiogram reviewed and independently interpreted by myself confirms sinus rhythm heart rate 77 normal electrocardiogram.         Review of Systems   Constitutional: Positive for diaphoresis and night sweats. Negative for chills, decreased appetite, fever, malaise/fatigue, weight gain and weight loss.   HENT:  Negative for congestion, ear discharge, ear pain, hearing loss, hoarse voice, nosebleeds, odynophagia, sore throat, stridor and  tinnitus.    Eyes:  Negative for blurred vision, discharge, double vision, pain, photophobia, redness, vision loss in left eye, vision loss in right eye, visual disturbance and visual halos.   Cardiovascular:  Positive for palpitations and syncope. Negative for chest pain, claudication, cyanosis, dyspnea on exertion, irregular heartbeat, leg swelling, near-syncope, orthopnea and paroxysmal nocturnal dyspnea.   Respiratory:  Negative for cough, hemoptysis, shortness of breath, sleep disturbances due to breathing, snoring, sputum production and wheezing.    Endocrine: Negative for cold intolerance, heat intolerance, polydipsia, polyphagia and polyuria.   Hematologic/Lymphatic: Negative for adenopathy and bleeding problem. Does not bruise/bleed easily.   Skin:  Negative for color change, dry skin, flushing, itching, nail changes, poor wound healing, rash, skin cancer, suspicious lesions and unusual hair distribution.   Musculoskeletal:  Positive for joint pain. Negative for arthritis, back pain, falls, gout, joint swelling, muscle cramps, muscle weakness, myalgias, neck pain and stiffness.   Gastrointestinal:  Positive for anorexia and diarrhea. Negative for bloating, abdominal pain, change in bowel habit, bowel incontinence, constipation, dysphagia, excessive appetite, flatus, heartburn, hematemesis, hematochezia, hemorrhoids, jaundice, melena, nausea and vomiting.   Genitourinary:  Negative for bladder incontinence, decreased libido, dysuria, flank pain, frequency, genital sores, hematuria, hesitancy, incomplete emptying, nocturia and urgency.   Neurological:  Positive for seizures. Negative for aphonia, brief paralysis, difficulty with concentration, disturbances in coordination, excessive daytime sleepiness, dizziness, focal weakness, headaches, light-headedness, loss of balance, numbness, paresthesias, sensory change, tremors, vertigo and weakness.   Psychiatric/Behavioral:  Positive for memory loss. Negative for  "altered mental status, depression, hallucinations, substance abuse, suicidal ideas and thoughts of violence. The patient is nervous/anxious. The patient does not have insomnia.    Allergic/Immunologic: Negative for hives and persistent infections.        Objective:       Vitals:    09/18/24 0952   BP: 124/82   Pulse: 76   Weight: 81.6 kg (179 lb 14.3 oz)   Height: 5' 10" (1.778 m)    Physical Exam  Constitutional:       General: He is not in acute distress.     Appearance: He is well-developed. He is not diaphoretic.   HENT:      Head: Normocephalic and atraumatic.      Nose: Nose normal.   Eyes:      General: No scleral icterus.        Right eye: No discharge.      Conjunctiva/sclera: Conjunctivae normal.      Pupils: Pupils are equal, round, and reactive to light.   Neck:      Thyroid: No thyromegaly.      Vascular: No JVD.      Trachea: No tracheal deviation.   Cardiovascular:      Rate and Rhythm: Normal rate and regular rhythm.      Pulses:           Carotid pulses are 2+ on the right side and 2+ on the left side.       Radial pulses are 2+ on the left side.        Dorsalis pedis pulses are 2+ on the right side and 2+ on the left side.        Posterior tibial pulses are 2+ on the right side and 2+ on the left side.      Heart sounds: Normal heart sounds. No murmur heard.     No friction rub. No gallop.   Pulmonary:      Effort: Pulmonary effort is normal. No respiratory distress.      Breath sounds: Normal breath sounds. No stridor. No wheezing or rales.   Chest:      Chest wall: No tenderness.   Abdominal:      General: Bowel sounds are normal. There is no distension.      Palpations: Abdomen is soft. There is no mass.      Tenderness: There is no abdominal tenderness. There is no guarding or rebound.   Musculoskeletal:         General: No tenderness. Normal range of motion.      Cervical back: Normal range of motion and neck supple.   Lymphadenopathy:      Cervical: No cervical adenopathy.   Skin:     " General: Skin is warm and dry.      Coloration: Skin is not pale.      Findings: No erythema or rash.   Neurological:      Mental Status: He is alert and oriented to person, place, and time.      Cranial Nerves: No cranial nerve deficit.      Coordination: Coordination normal.   Psychiatric:         Behavior: Behavior normal.         Thought Content: Thought content normal.         Judgment: Judgment normal.           Assessment:       1. Palpitations    2. Racing heart beat    3. Chest pain, unspecified type    4. Vasovagal episode    5. Spells of decreased attentiveness    6. Seizure    7. LOC (loss of consciousness)      Results for orders placed or performed in visit on 06/26/24   CBC Auto Differential   Result Value Ref Range    WBC 7.90 3.90 - 12.70 K/uL    RBC 5.09 4.60 - 6.20 M/uL    Hemoglobin 16.0 14.0 - 18.0 g/dL    Hematocrit 45.3 40.0 - 54.0 %    MCV 89 82 - 98 fL    MCH 31.4 (H) 27.0 - 31.0 pg    MCHC 35.3 32.0 - 36.0 g/dL    RDW 12.6 11.5 - 14.5 %    Platelets 263 150 - 450 K/uL    MPV 10.3 9.2 - 12.9 fL    Immature Granulocytes 0.3 0.0 - 0.5 %    Gran # (ANC) 5.3 1.8 - 7.7 K/uL    Immature Grans (Abs) 0.02 0.00 - 0.04 K/uL    Lymph # 1.6 1.0 - 4.8 K/uL    Mono # 0.6 0.3 - 1.0 K/uL    Eos # 0.3 0.0 - 0.5 K/uL    Baso # 0.07 0.00 - 0.20 K/uL    nRBC 0 0 /100 WBC    Gran % 67.4 38.0 - 73.0 %    Lymph % 19.9 18.0 - 48.0 %    Mono % 7.5 4.0 - 15.0 %    Eosinophil % 4.3 0.0 - 8.0 %    Basophil % 0.9 0.0 - 1.9 %    Differential Method Automated    Comprehensive Metabolic Panel   Result Value Ref Range    Sodium 138 136 - 145 mmol/L    Potassium 4.0 3.5 - 5.1 mmol/L    Chloride 107 95 - 110 mmol/L    CO2 24 23 - 29 mmol/L    Glucose 96 70 - 110 mg/dL    BUN 13 6 - 20 mg/dL    Creatinine 1.0 0.5 - 1.4 mg/dL    Calcium 9.4 8.7 - 10.5 mg/dL    Total Protein 7.4 6.0 - 8.4 g/dL    Albumin 4.0 3.5 - 5.2 g/dL    Total Bilirubin 1.4 (H) 0.1 - 1.0 mg/dL    Alkaline Phosphatase 87 55 - 135 U/L    AST 19 10 - 40 U/L     ALT 25 10 - 44 U/L    eGFR >60.0 >60 mL/min/1.73 m^2    Anion Gap 7 (L) 8 - 16 mmol/L   Lipid Panel   Result Value Ref Range    Cholesterol 193 120 - 199 mg/dL    Triglycerides 174 (H) 30 - 150 mg/dL    HDL 39 (L) 40 - 75 mg/dL    LDL Cholesterol 119.2 63.0 - 159.0 mg/dL    HDL/Cholesterol Ratio 20.2 20.0 - 50.0 %    Total Cholesterol/HDL Ratio 4.9 2.0 - 5.0    Non-HDL Cholesterol 154 mg/dL   TSH   Result Value Ref Range    TSH 0.971 0.400 - 4.000 uIU/mL   Hemoglobin A1C   Result Value Ref Range    Hemoglobin A1C 5.1 4.0 - 5.6 %    Estimated Avg Glucose 100 68 - 131 mg/dL   Vitamin B12   Result Value Ref Range    Vitamin B-12 424 210 - 950 pg/mL   TESTOSTERONE PANEL   Result Value Ref Range    Testosterone 333 250 - 1100 ng/dL    Testosterone, Free 43.7 (L) 46.0 - 224.0 pg/mL    Testosterone, Bioavailable 89.9 (L) ng/dL    Sex Hormone Binding Globulin 32 10 - 50 nmol/L    Albumin 4.5 3.6 - 5.1 g/dL         Current Outpatient Medications:     dextroamphetamine-amphetamine (ADDERALL XR) 20 MG 24 hr capsule, Take 1 tablet by mouth every day, Disp: 30 capsule, Rfl: 0    FLUoxetine 40 MG capsule, take one capsule by mouth once a day, Disp: 90 capsule, Rfl: 0    lamoTRIgine (LAMICTAL) 100 MG tablet, Take 1 tablet (100 mg total) by mouth 2 (two) times daily., Disp: 60 tablet, Rfl: 5    lamoTRIgine (LAMICTAL) 25 MG tablet, Take 1 tablet (25 mg total) by mouth once daily. In addition to the 100mg tablets, Disp: 60 tablet, Rfl: 11     Lab Results   Component Value Date    WBC 7.90 06/26/2024    RBC 5.09 06/26/2024    HGB 16.0 06/26/2024    HCT 45.3 06/26/2024    MCV 89 06/26/2024    MCH 31.4 (H) 06/26/2024    MCHC 35.3 06/26/2024    RDW 12.6 06/26/2024     06/26/2024    MPV 10.3 06/26/2024    GRAN 5.3 06/26/2024    GRAN 67.4 06/26/2024    LYMPH 1.6 06/26/2024    LYMPH 19.9 06/26/2024    MONO 0.6 06/26/2024    MONO 7.5 06/26/2024    EOS 0.3 06/26/2024    BASO 0.07 06/26/2024    EOSINOPHIL 4.3 06/26/2024    BASOPHIL 0.9  "06/26/2024    MG 2.2 06/04/2023        CMP  Lab Results   Component Value Date     06/26/2024    K 4.0 06/26/2024     06/26/2024    CO2 24 06/26/2024    GLU 96 06/26/2024    BUN 13 06/26/2024    CREATININE 1.0 06/26/2024    CALCIUM 9.4 06/26/2024    PROT 7.4 06/26/2024    ALBUMIN 4.0 06/26/2024    BILITOT 1.4 (H) 06/26/2024    ALKPHOS 87 06/26/2024    AST 19 06/26/2024    ALT 25 06/26/2024    ANIONGAP 7 (L) 06/26/2024    ESTGFRAFRICA >60.0 06/15/2022    EGFRNONAA >60.0 06/15/2022        No results found for: "LABBLOO", "LABURIN", "RESPIRATORYC", "GSRESP"         Results for orders placed or performed during the hospital encounter of 06/02/23   EKG 12-lead    Collection Time: 06/02/23  3:21 PM    Narrative    Test Reason : R56.9,    Vent. Rate : 074 BPM     Atrial Rate : 074 BPM     P-R Int : 146 ms          QRS Dur : 082 ms      QT Int : 368 ms       P-R-T Axes : 065 070 043 degrees     QTc Int : 408 ms    Normal sinus rhythm with sinus arrhythmia  Normal ECG  When compared with ECG of 24-SEP-2022 18:56,  No significant change was found  Confirmed by VERITO MALAGON MD ([id number]) on 6/2/2023 3:38:43 PM    Referred By: [email or web address]   SELF           Confirmed By:VERITO MALAGON MD        CT Abdomen Pelvis With Contrast 08/18/2023 [id number] Final   CT Head Without Contrast 06/02/2023 [id number] Final   X-Ray Chest 1 View 06/02/2023 [id number] Final            Plan:       Problem List Items Addressed This Visit          Neuro    LOC (loss of consciousness)     Occurring more than 2 years ago.  Workup negative.  It was signed out as a possible seizure.  Echocardiogram ordered.         Seizure     Neurology is following.  He is on a trial of anti seizure medication but he is not responding.            Cardiac/Vascular    Chest pain     His current electrocardiogram does not show ischemic ST changes.  Echocardiogram ordered.  His symptoms were not activity related.  Stress test not advised at this time.         Relevant Orders "    Echo    Palpitations - Primary     Reported intermittently.  A lot of the racing heart complaints he reports are related to position changes.  Holter monitor ordered.  Primary arrhythmia unlikely based on symptoms.            Other    Spells of decreased attentiveness     Undetermined trigger at this time.  Neurology is following for seizure evaluation.         Vasovagal episode     Most likely his syncopal episode was vasovagal.  Difficult management problem.  He is very anxious.          Other Visit Diagnoses       Racing heart beat        Relevant Orders    Holter monitor - 48 hour               I think his history does support vasovagal events in the past.  However, I do not feel like it explains all his symptomatology.  Today, there was a 10 mm drop of blood pressure on standing from the supine position.      Holter monitor and echo ordered.  One-month follow-up recommended.  They will continue taking vital signs.    The patient will return in about a month, hopefully when he is having a difficult week so that assessment can be made during symptoms.    Thank you for allowing me to participate in your patient's care.                  Chicho Nick MD  09/18/2024   10:12 AM

## 2024-09-18 NOTE — PROGRESS NOTES
Population Health Chart Review & Patient Outreach Details      Additional [facility] Notes:    Per BELEN in basket message, pt declined flu vaccine. HM updated.           Updates Requested / Reviewed:      Updated Care Coordination Note, Care Everywhere, and Immunizations Reconciliation Completed or Queried: [place]         Health Maintenance Topics Overdue:      [facility] Score: 0     Patient is not due for any topics at this time.                       Health Maintenance Topic(s) Outreach Outcomes & Actions Taken:

## 2024-09-18 NOTE — ASSESSMENT & PLAN NOTE
His current electrocardiogram does not show ischemic ST changes.  Echocardiogram ordered.  His symptoms were not activity related.  Stress test not advised at this time.

## 2024-09-18 NOTE — ASSESSMENT & PLAN NOTE
Most likely his syncopal episode was vasovagal.  Difficult management problem.  He is very anxious.

## 2024-09-20 LAB
OHS QRS DURATION: 84 MS
OHS QTC CALCULATION: 427 MS

## 2024-09-25 ENCOUNTER — PATIENT MESSAGE (OUTPATIENT)
Dept: CARDIOLOGY | Facility: CLINIC | Age: 49
End: 2024-09-25
Payer: COMMERCIAL

## 2024-09-30 DIAGNOSIS — R56.9 SEIZURE-LIKE ACTIVITY: Primary | ICD-10-CM

## 2024-09-30 RX ORDER — LAMOTRIGINE 25 MG/1
25 TABLET ORAL 2 TIMES DAILY
Qty: 60 TABLET | Refills: 11 | Status: SHIPPED | OUTPATIENT
Start: 2024-09-30 | End: 2025-09-30

## 2024-09-30 NOTE — TELEPHONE ENCOUNTER
----- Message from Kami sent at 2024 10:59 AM CDT -----  Contact: PATIENT  Luis Daniel Wynne Jr.  MRN: [id number]  : 1975  PCP: Zenobia Dunbar  Home Phone      [phone]  Work Phone      Not on file.  Mobile          [phone]      MESSAGE: Patient states that he has been taking 2 25 mg of lamoTRIgine daily instead of 1 daily and is now out of the medication.  He would like to see if Dr. Sarabia can prescribe the 2 a day since that is what he has been taking and doing well on.  He would like the new prescription to be sent to [facility].          Phone: [phone]

## 2024-10-09 ENCOUNTER — OFFICE VISIT (OUTPATIENT)
Dept: INTERNAL MEDICINE | Facility: CLINIC | Age: 49
End: 2024-10-09
Payer: COMMERCIAL

## 2024-10-09 DIAGNOSIS — R19.7 DIARRHEA, UNSPECIFIED TYPE: ICD-10-CM

## 2024-10-09 DIAGNOSIS — R56.9 SEIZURE-LIKE ACTIVITY: Primary | ICD-10-CM

## 2024-10-09 PROCEDURE — G2211 COMPLEX E/M VISIT ADD ON: HCPCS | Mod: 95,,, | Performed by: INTERNAL MEDICINE

## 2024-10-09 PROCEDURE — 1160F RVW MEDS BY RX/DR IN RCRD: CPT | Mod: CPTII,95,, | Performed by: INTERNAL MEDICINE

## 2024-10-09 PROCEDURE — 3044F HG A1C LEVEL LT 7.0%: CPT | Mod: CPTII,95,, | Performed by: INTERNAL MEDICINE

## 2024-10-09 PROCEDURE — 1159F MED LIST DOCD IN RCRD: CPT | Mod: CPTII,95,, | Performed by: INTERNAL MEDICINE

## 2024-10-09 PROCEDURE — 99214 OFFICE O/P EST MOD 30 MIN: CPT | Mod: 95,,, | Performed by: INTERNAL MEDICINE

## 2024-10-09 NOTE — PROGRESS NOTES
The patient location is:  [place]  The chief complaint leading to consultation is:  Followup of seizure-like activity    Visit type: audiovisual    Face to Face time with patient:  21   minutes of total time spent on the encounter, which includes face to face time and non-face to face time preparing to see the patient (eg, review of tests), Obtaining and/or reviewing separately obtained history, Documenting clinical information in the electronic or other health record, Independently interpreting results (not separately reported) and communicating results to the patient/family/caregiver, or Care coordination (not separately reported).         Each patient to whom he or she provides medical services by telemedicine is:  (1) informed of the relationship between the physician and patient and the respective role of any other health care provider with respect to management of the patient; and (2) notified that he or she may decline to receive medical services by telemedicine and may withdraw from such care at any time.    Notes:   CC: followup of seizure-like activity  HPI:  The patient is a 48 y.o. year old male who presents to the office for followup of seizure-like activity.        SEIZURE HISTORY:  He reports recent changes in seizure pattern, experiencing multiple smaller seizures over consecutive days, including one on Monday. Symptoms include goosebumps, lightheadedness, and chills. Previously, seizures occurred monthly, lasting about a week, associated with digestive problems. His neurologist described these episodes as consisting of hundreds of different types of seizures. The most recent EEG showed hundreds of seizure-like activities, despite not being aware of experiencing seizures during the monitoring period. The neurologist recommended increasing lamotrigine dosage by 25 mg.    GASTROINTESTINAL ISSUES:  He experiences periodic GI system shutdowns lasting 4-5 days, followed by severe diarrhea. He denies  "associated abdominal pain during these episodes.    CARDIOVASCULAR AND NEUROLOGICAL SYMPTOMS:  He reports sensations similar to blood pressure fluctuations, describing them as feeling lightheaded, comparable to standing up quickly, but notes these episodes are not severe. He recently visited a cardiologist with comprehensive testing performed, with no significant findings reported. He also reports loss of gag reflex and taste changes.    PAST MEDICAL HISTORY:  He has a history of PTSD, fibromyalgia, testosterone fluctuations (levels ranging from below 300 to over 1,200), and low bioavailable testosterone. He mentions having neurological issues since childhood, including migraines and digestive problems.    MEDICATIONS:  He is currently taking fluoxetine and Lamictal. He is not currently taking Adderall due to his current circumstances. He denies any medication allergies.    IMPACT ON DAILY LIFE:  His condition significantly impacts his daily life. He is unable to drive and has not done so for an extended period. He experiences limited mobility outside the home, describing himself as "stuck" at his house for the past 2.5 years. He notes reduced stamina, stating that he gets winded easily and his endurance has suffered over the course of his illness.    SOCIAL HISTORY:  He lives with his wife and has a dog that is able to detect his seizures before they occur.      ROS:  Constitutional: +chills, +lightheadedness  Gastrointestinal: -abdominal pain, +diarrhea  Neurological: +weakness       PAST MEDICAL HISTORY:  Past Medical History:   Diagnosis Date    Depression     Fibromyalgia     Marijuana use     Nicotine addiction 06/15/2022    Uses nicorette lozenges       Peptic ulcer     Scoliosis     Tobacco use 06/02/2023       SURGICAL HISTORY:  Past Surgical History:   Procedure Laterality Date    COLONOSCOPY N/A 4/5/2023    Procedure: COLONOSCOPY sutab;  Surgeon: Angelo Salgado MD;  Location: [facility];  Service: " Endoscopy;  Laterality: N/A;    ESOPHAGOGASTRODUODENOSCOPY N/A 4/5/2023    Procedure: EGD (ESOPHAGOGASTRODUODENOSCOPY);  Surgeon: Angelo Salgado MD;  Location: [facility];  Service: Endoscopy;  Laterality: N/A;    KNEE ARTHROSCOPY         MEDS:  Medcard reviewed and updated    ALLERGIES: Allergy Card reviewed and updated    SOCIAL HISTORY:   The patient is a nonsmoker.    PE:   APPEARANCE: Well nourished, well developed, in no acute distress.    Video visit  PSYCHIATRIC: The patient is oriented to person, place, and time and has a pleasant affect.        ASSESSMENT/PLAN:  Diagnoses and all orders for this visit:    Seizure-like activity  -     continue current therapy  -     followed by neurology    Diarrhea, unspecified type  -     followed by GI  -     recommend patient go to the emergency department if symptoms recur and requests that GI doctor be contacted      Considered patient's complex presentation of seizure-like episodes, GI symptoms, and other neurological issues  Noted patient's difficulty in obtaining diagnostic studies during symptomatic periods  Recommend emergency room evaluation during symptomatic episodes to facilitate timely specialist consultation and testing  Suggested coordinating with GI and neurology specialists to develop a plan for emergency room evaluation during flare-ups    UNSPECIFIED SYMPTOMATIC EPISODES:  - Luis Daniel to go to the emergency room when experiencing symptomatic episodes for evaluation and potential specialist consultation.  - Luis Daniel to reach out to GI doctor and neurologist to inform them of plan to present to ER during flare-ups and discuss potential evaluation strategies.

## 2024-10-10 ENCOUNTER — PATIENT MESSAGE (OUTPATIENT)
Dept: CARDIOLOGY | Facility: CLINIC | Age: 49
End: 2024-10-10
Payer: COMMERCIAL

## 2024-10-15 ENCOUNTER — PATIENT MESSAGE (OUTPATIENT)
Dept: NEUROLOGY | Facility: CLINIC | Age: 49
End: 2024-10-15
Payer: COMMERCIAL

## 2024-10-17 RX ORDER — LAMOTRIGINE 150 MG/1
150 TABLET ORAL 2 TIMES DAILY
Qty: 60 TABLET | Refills: 11 | Status: SHIPPED | OUTPATIENT
Start: 2024-10-17 | End: 2025-10-17

## 2024-10-22 ENCOUNTER — TELEPHONE (OUTPATIENT)
Dept: UROLOGY | Facility: CLINIC | Age: 49
End: 2024-10-22
Payer: COMMERCIAL

## 2024-10-22 NOTE — TELEPHONE ENCOUNTER
Call was placed back to patient he was informed that his testosterone levels weren't under 300 I order to have a consult with the provider. E was given other urologists that might see him for his testosterone concerns.

## 2024-10-22 NOTE — TELEPHONE ENCOUNTER
----- Message from Sujata sent at 10/22/2024 10:01 AM CDT -----  Regarding: appt  Patient is calling because he is trying to make a appt with Dr Abdi but since there is no appts available sooner then he was looking for he was wanting to see if he could switch urologist . Please contact patient either to see if he can get a sooner appt with Dr Abdi or to switch with to another urologist to get a sooner appt. Please contact patient at [phone]

## 2024-11-14 ENCOUNTER — PATIENT MESSAGE (OUTPATIENT)
Dept: UROLOGY | Facility: CLINIC | Age: 49
End: 2024-11-14
Payer: COMMERCIAL

## 2024-11-14 ENCOUNTER — TELEPHONE (OUTPATIENT)
Dept: UROLOGY | Facility: CLINIC | Age: 49
End: 2024-11-14
Payer: COMMERCIAL

## 2024-11-14 NOTE — TELEPHONE ENCOUNTER
Call placed to pt regarding upcoming appt with Dr. Abdi. Pt unable to be reached via phone, left  with clinic #.

## 2024-11-14 NOTE — TELEPHONE ENCOUNTER
Call placed to pt regarding upcoming appt with Dr. Abdi. Pt informed that Dr. Abdi states he cannot prescribe testosterone replacement since pt's overall testosterone is above 300/within normal range, so the appt on Monday is not warranted. Pt requested to send a message to Dr. Abdi to notify of symptoms of low libido and ED. Dr. Abdi states that he recommends he follow up with his PCP for low libido and ED, states he will also message pt's PCP to create a plan for pt. Pt verbalized understanding and agreed.

## 2024-11-22 ENCOUNTER — PATIENT MESSAGE (OUTPATIENT)
Dept: NEUROLOGY | Facility: CLINIC | Age: 49
End: 2024-11-22
Payer: COMMERCIAL

## 2024-11-26 ENCOUNTER — PATIENT MESSAGE (OUTPATIENT)
Dept: ADMINISTRATIVE | Facility: HOSPITAL | Age: 49
End: 2024-11-26
Payer: COMMERCIAL

## 2024-12-04 ENCOUNTER — TELEPHONE (OUTPATIENT)
Dept: NEUROLOGY | Facility: CLINIC | Age: 49
End: 2024-12-04
Payer: COMMERCIAL

## 2024-12-04 NOTE — TELEPHONE ENCOUNTER
Returned call to patient to clarify information to be faxed. Patient states fax last office visit note dated 9/5/2024 and EEG dated 5/6/2024. As per patient request last office visit note dated 9/5/2024 and EEG dated 5/6/2024 faxed to Dr. Shreyas Das with [facility] at [phone], confirmation received.

## 2024-12-04 NOTE — TELEPHONE ENCOUNTER
----- Message from Madina Becerril sent at 12/4/2024  3:00 PM CST -----  Regarding: EEG Results Needed  Contact: [phone]  Test Results:    Name of Caller: Patient    Name of Test: EEG    Date of Test: September 5, 2025 per patient    Call back or response via [facility]: Call Back    Call back #: [phone]    Additional Info (optional): Patient is calling asking for EEG results from September 2024 be sent to his Psychiatrist. Patient's psychiatrist information is below:     Dr. Shreyas Das [place] [address], [address], [place] [address]  Fax Number: [phone]

## 2024-12-12 DIAGNOSIS — Z12.11 SPECIAL SCREENING FOR MALIGNANT NEOPLASMS, COLON: ICD-10-CM

## 2024-12-13 RX ORDER — POLYETHYLENE GLYCOL-3350 AND ELECTROLYTES 236; 6.74; 5.86; 2.97; 22.74 G/274.31G; G/274.31G; G/274.31G; G/274.31G; G/274.31G
POWDER, FOR SOLUTION ORAL
Qty: 1 EACH | Refills: 0 | Status: SHIPPED | OUTPATIENT
Start: 2024-12-13

## 2024-12-18 ENCOUNTER — PATIENT MESSAGE (OUTPATIENT)
Dept: NEUROLOGY | Facility: CLINIC | Age: 49
End: 2024-12-18
Payer: COMMERCIAL

## 2024-12-19 ENCOUNTER — OFFICE VISIT (OUTPATIENT)
Dept: NEUROLOGY | Facility: CLINIC | Age: 49
End: 2024-12-19
Payer: COMMERCIAL

## 2024-12-19 ENCOUNTER — TELEPHONE (OUTPATIENT)
Dept: NEUROLOGY | Facility: CLINIC | Age: 49
End: 2024-12-19

## 2024-12-19 DIAGNOSIS — R94.01 ABNORMAL EEG: ICD-10-CM

## 2024-12-19 DIAGNOSIS — R41.3 MEMORY LOSS: ICD-10-CM

## 2024-12-19 DIAGNOSIS — R56.9 SEIZURE-LIKE ACTIVITY: Primary | ICD-10-CM

## 2024-12-19 DIAGNOSIS — F39 MOOD DISORDER: ICD-10-CM

## 2024-12-19 PROCEDURE — 1159F MED LIST DOCD IN RCRD: CPT | Mod: CPTII,95,, | Performed by: PSYCHIATRY & NEUROLOGY

## 2024-12-19 PROCEDURE — 3044F HG A1C LEVEL LT 7.0%: CPT | Mod: CPTII,95,, | Performed by: PSYCHIATRY & NEUROLOGY

## 2024-12-19 PROCEDURE — 99214 OFFICE O/P EST MOD 30 MIN: CPT | Mod: 95,,, | Performed by: PSYCHIATRY & NEUROLOGY

## 2024-12-19 PROCEDURE — 1160F RVW MEDS BY RX/DR IN RCRD: CPT | Mod: CPTII,95,, | Performed by: PSYCHIATRY & NEUROLOGY

## 2024-12-19 NOTE — TELEPHONE ENCOUNTER
In-basket referral message sent to [facility] Neurology Epilepsy Dept Clinical Support and Dr. Blanca Parisi Staff to contact to schedule epileptology appointment as per referral, and patient has seen  in the past. EEG referral faxed to [facility] [phone], confirmation received.
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
## 2025-01-02 ENCOUNTER — PATIENT MESSAGE (OUTPATIENT)
Dept: NEUROLOGY | Facility: CLINIC | Age: 50
End: 2025-01-02
Payer: COMMERCIAL

## 2025-01-14 DIAGNOSIS — Z00.00 ENCOUNTER FOR MEDICARE ANNUAL WELLNESS EXAM: ICD-10-CM

## 2025-01-28 ENCOUNTER — TELEPHONE (OUTPATIENT)
Dept: NEUROLOGY | Facility: CLINIC | Age: 50
End: 2025-01-28

## 2025-01-28 ENCOUNTER — OFFICE VISIT (OUTPATIENT)
Dept: NEUROLOGY | Facility: CLINIC | Age: 50
End: 2025-01-28
Payer: COMMERCIAL

## 2025-01-28 DIAGNOSIS — R56.9 SEIZURE-LIKE ACTIVITY: ICD-10-CM

## 2025-01-28 DIAGNOSIS — R94.01 ABNORMAL EEG: ICD-10-CM

## 2025-01-28 DIAGNOSIS — R56.9 SEIZURE-LIKE ACTIVITY: Primary | ICD-10-CM
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
## 2025-01-28 NOTE — TELEPHONE ENCOUNTER
EMU scheduled 3/11/25, 11am. Specifically requested this date so he can be here 2 days before the full moon (3/13/25) b/c he his events coincide with the full moon cycle. Aware an adult is required to accompany him for the duration including overnight. Aware he will be connected to lines to his head, chest, arm, have an IV placed; will not be able to leave the room until all equipment is removed at discharge. Instructions thoroughly discussed; mailed via [employer].  Denies any special needs.

## 2025-02-10 ENCOUNTER — TELEPHONE (OUTPATIENT)
Dept: NEUROLOGY | Facility: CLINIC | Age: 50
End: 2025-02-10
Payer: COMMERCIAL

## 2025-02-10 NOTE — TELEPHONE ENCOUNTER
"This patient is currently also under the care of Dr Parisi in epileptology.  He completed an ambulatory EEG monitoring with me. The faxed results are on my desk and abnormal.  Please  get in touch with that clinic so we can fax if for Dr. FAIRBANKS's review. It will take too long to go into media.    Please also let the patient know that we are doing this and let him know he had one spell of seizure during the recording but apparently had no symptoms. It was a small seizure.  A spell in which he felt weird and recorded that did not correlate with any seizure activity on that EEG.  I recommend follow up with Dr FAIRBANKS for further instructions    Note for MD:   "Asynchronous epileptiform activity in the temporal regions, more on the right" suggesting risk for focal seizures. One focal seizure captured from the left temporal region in which patient did not appear to have any change in symptoms.   "

## 2025-02-11 ENCOUNTER — OFFICE VISIT (OUTPATIENT)
Dept: NEUROLOGY | Facility: CLINIC | Age: 50
End: 2025-02-11
Payer: COMMERCIAL

## 2025-02-11 DIAGNOSIS — R56.9 SEIZURE-LIKE ACTIVITY: ICD-10-CM

## 2025-02-11 DIAGNOSIS — F39 MOOD DISORDER: ICD-10-CM

## 2025-02-11 DIAGNOSIS — R41.3 MEMORY LOSS: ICD-10-CM

## 2025-02-11 DIAGNOSIS — G40.804 OTHER INTRACTABLE EPILEPSY WITHOUT STATUS EPILEPTICUS: Primary | ICD-10-CM
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
## 2025-02-17 ENCOUNTER — TELEPHONE (OUTPATIENT)
Dept: NEUROLOGY | Facility: CLINIC | Age: 50
End: 2025-02-17
Payer: COMMERCIAL

## 2025-02-18 ENCOUNTER — TELEPHONE (OUTPATIENT)
Dept: NEUROLOGY | Facility: CLINIC | Age: 50
End: 2025-02-18
Payer: COMMERCIAL

## 2025-02-18 NOTE — TELEPHONE ENCOUNTER
Spoke with patient about EMU scheduled 3/11/25- he has a  beginning 3/12. I explained it is the unit policy to have someone with him at all times and he will not be admitted without that person. He asked why couldn't he admit on the 12th or 13th. I explained I offered those dates to him when scheduling and he insisted on admitting on 3/11/25- 2 days before the full rios. He confirmed this is true. I cannot delay the admission b/c it is all booked now. He confirmed he would try and find someone for the 3/11 day.

## 2025-02-19 ENCOUNTER — PATIENT MESSAGE (OUTPATIENT)
Dept: INTERNAL MEDICINE | Facility: CLINIC | Age: 50
End: 2025-02-19
Payer: COMMERCIAL

## 2025-02-20 ENCOUNTER — TELEPHONE (OUTPATIENT)
Dept: NEUROLOGY | Facility: CLINIC | Age: 50
End: 2025-02-20
Payer: COMMERCIAL

## 2025-02-20 DIAGNOSIS — R56.9 SEIZURE-LIKE ACTIVITY: Primary | ICD-10-CM

## 2025-02-21 ENCOUNTER — HOSPITAL ENCOUNTER (EMERGENCY)
Facility: HOSPITAL | Age: 50
Discharge: HOME | End: 2025-02-21
Attending: SURGERY
Payer: COMMERCIAL

## 2025-02-21 VITALS
BODY MASS INDEX: 26.28 KG/M2 | TEMPERATURE: 99 F | HEART RATE: 71 BPM | DIASTOLIC BLOOD PRESSURE: 74 MMHG | OXYGEN SATURATION: 96 % | HEIGHT: 70 IN | SYSTOLIC BLOOD PRESSURE: 129 MMHG | RESPIRATION RATE: 16 BRPM | WEIGHT: 183.56 LBS

## 2025-02-21 DIAGNOSIS — N20.0 KIDNEY STONES, CALCIUM OXALATE: Primary | ICD-10-CM

## 2025-02-21 LAB
ALBUMIN SERPL BCP-MCNC: 4.4 G/DL (ref 3.5–5.2)
ALP SERPL-CCNC: 89 U/L (ref 40–150)
ALT SERPL W/O P-5'-P-CCNC: 31 U/L (ref 10–44)
ANION GAP SERPL CALC-SCNC: 11 MMOL/L (ref 8–16)
AST SERPL-CCNC: 18 U/L (ref 10–40)
BACTERIA #/AREA URNS HPF: ABNORMAL /HPF
BASOPHILS # BLD AUTO: 0.04 K/UL (ref 0–0.2)
BASOPHILS NFR BLD: 0.4 % (ref 0–1.9)
BILIRUB SERPL-MCNC: 1 MG/DL (ref 0.1–1)
BILIRUB UR QL STRIP: ABNORMAL
BUN SERPL-MCNC: 13 MG/DL (ref 6–20)
CALCIUM SERPL-MCNC: 9.3 MG/DL (ref 8.7–10.5)
CHLORIDE SERPL-SCNC: 106 MMOL/L (ref 95–110)
CLARITY UR: ABNORMAL
CO2 SERPL-SCNC: 23 MMOL/L (ref 23–29)
COLOR UR: ABNORMAL
CREAT SERPL-MCNC: 1.4 MG/DL (ref 0.5–1.4)
DIFFERENTIAL METHOD BLD: ABNORMAL
EOSINOPHIL # BLD AUTO: 0.1 K/UL (ref 0–0.5)
EOSINOPHIL NFR BLD: 1.3 % (ref 0–8)
ERYTHROCYTE [DISTWIDTH] IN BLOOD BY AUTOMATED COUNT: 12.4 % (ref 11.5–14.5)
EST. GFR  (NO RACE VARIABLE): >60 ML/MIN/1.73 M^2
GLUCOSE SERPL-MCNC: 120 MG/DL (ref 70–110)
GLUCOSE UR QL STRIP: NEGATIVE
HCT VFR BLD AUTO: 43.1 % (ref 40–54)
HGB BLD-MCNC: 15.2 G/DL (ref 14–18)
HGB UR QL STRIP: ABNORMAL
HYALINE CASTS #/AREA URNS LPF: 1 /LPF
IMM GRANULOCYTES # BLD AUTO: 0.03 K/UL (ref 0–0.04)
IMM GRANULOCYTES NFR BLD AUTO: 0.3 % (ref 0–0.5)
KETONES UR QL STRIP: ABNORMAL
LEUKOCYTE ESTERASE UR QL STRIP: NEGATIVE
LIPASE SERPL-CCNC: 34 U/L (ref 4–60)
LYMPHOCYTES # BLD AUTO: 1.4 K/UL (ref 1–4.8)
LYMPHOCYTES NFR BLD: 13.8 % (ref 18–48)
MCH RBC QN AUTO: 30.7 PG (ref 27–31)
MCHC RBC AUTO-ENTMCNC: 35.3 G/DL (ref 32–36)
MCV RBC AUTO: 87 FL (ref 82–98)
MICROSCOPIC COMMENT: ABNORMAL
MONOCYTES # BLD AUTO: 0.5 K/UL (ref 0.3–1)
MONOCYTES NFR BLD: 4.8 % (ref 4–15)
NEUTROPHILS # BLD AUTO: 8.1 K/UL (ref 1.8–7.7)
NEUTROPHILS NFR BLD: 79.4 % (ref 38–73)
NITRITE UR QL STRIP: NEGATIVE
NRBC BLD-RTO: 0 /100 WBC
PH UR STRIP: 7 [PH] (ref 5–8)
PLATELET # BLD AUTO: 255 K/UL (ref 150–450)
PMV BLD AUTO: 10 FL (ref 9.2–12.9)
POTASSIUM SERPL-SCNC: 3.8 MMOL/L (ref 3.5–5.1)
PROT SERPL-MCNC: 7.6 G/DL (ref 6–8.4)
PROT UR QL STRIP: ABNORMAL
RBC # BLD AUTO: 4.95 M/UL (ref 4.6–6.2)
RBC #/AREA URNS HPF: >100 /HPF (ref 0–4)
SODIUM SERPL-SCNC: 140 MMOL/L (ref 136–145)
SP GR UR STRIP: 1.02 (ref 1–1.03)
TROPONIN I SERPL DL<=0.01 NG/ML-MCNC: <0.006 NG/ML (ref 0–0.03)
URN SPEC COLLECT METH UR: ABNORMAL
UROBILINOGEN UR STRIP-ACNC: NEGATIVE EU/DL
WBC # BLD AUTO: 10.14 K/UL (ref 3.9–12.7)
WBC #/AREA URNS HPF: 9 /HPF (ref 0–5)

## 2025-02-21 PROCEDURE — 96374 THER/PROPH/DIAG INJ IV PUSH: CPT

## 2025-02-21 PROCEDURE — 25000003 PHARM REV CODE 250: Performed by: NURSE PRACTITIONER

## 2025-02-21 PROCEDURE — 93010 ELECTROCARDIOGRAM REPORT: CPT | Mod: ,,, | Performed by: INTERNAL MEDICINE

## 2025-02-21 PROCEDURE — 84484 ASSAY OF TROPONIN QUANT: CPT | Performed by: NURSE PRACTITIONER

## 2025-02-21 PROCEDURE — 81000 URINALYSIS NONAUTO W/SCOPE: CPT | Performed by: NURSE PRACTITIONER

## 2025-02-21 PROCEDURE — 96361 HYDRATE IV INFUSION ADD-ON: CPT

## 2025-02-21 PROCEDURE — 63600175 PHARM REV CODE 636 W HCPCS: Mod: JZ,TB | Performed by: SURGERY

## 2025-02-21 PROCEDURE — 80053 COMPREHEN METABOLIC PANEL: CPT | Performed by: NURSE PRACTITIONER

## 2025-02-21 PROCEDURE — 83690 ASSAY OF LIPASE: CPT | Performed by: NURSE PRACTITIONER

## 2025-02-21 PROCEDURE — 63600175 PHARM REV CODE 636 W HCPCS: Performed by: NURSE PRACTITIONER

## 2025-02-21 PROCEDURE — 99285 EMERGENCY DEPT VISIT HI MDM: CPT | Mod: 25

## 2025-02-21 PROCEDURE — 85025 COMPLETE CBC W/AUTO DIFF WBC: CPT | Performed by: NURSE PRACTITIONER

## 2025-02-21 PROCEDURE — 96375 TX/PRO/DX INJ NEW DRUG ADDON: CPT

## 2025-02-21 PROCEDURE — 25500020 PHARM REV CODE 255: Performed by: SURGERY

## 2025-02-21 PROCEDURE — 93005 ELECTROCARDIOGRAM TRACING: CPT

## 2025-02-21 RX ORDER — ONDANSETRON HYDROCHLORIDE 2 MG/ML
4 INJECTION, SOLUTION INTRAVENOUS
Status: COMPLETED | OUTPATIENT
Start: 2025-02-21 | End: 2025-02-21

## 2025-02-21 RX ORDER — SODIUM CHLORIDE 9 MG/ML
1000 INJECTION, SOLUTION INTRAVENOUS
Status: COMPLETED | OUTPATIENT
Start: 2025-02-21 | End: 2025-02-21

## 2025-02-21 RX ORDER — HYDROCODONE BITARTRATE AND ACETAMINOPHEN 5; 325 MG/1; MG/1
1 TABLET ORAL EVERY 4 HOURS PRN
Qty: 15 TABLET | Refills: 0 | Status: SHIPPED | OUTPATIENT
Start: 2025-02-21 | End: 2025-02-24

## 2025-02-21 RX ORDER — KETOROLAC TROMETHAMINE 10 MG/1
10 TABLET, FILM COATED ORAL EVERY 6 HOURS PRN
Qty: 15 TABLET | Refills: 0 | Status: SHIPPED | OUTPATIENT
Start: 2025-02-21

## 2025-02-21 RX ORDER — KETOROLAC TROMETHAMINE 30 MG/ML
30 INJECTION, SOLUTION INTRAMUSCULAR; INTRAVENOUS
Status: COMPLETED | OUTPATIENT
Start: 2025-02-21 | End: 2025-02-21

## 2025-02-21 RX ORDER — FLUOXETINE HYDROCHLORIDE 40 MG/1
40 CAPSULE ORAL DAILY
COMMUNITY

## 2025-02-21 RX ORDER — TAMSULOSIN HYDROCHLORIDE 0.4 MG/1
0.4 CAPSULE ORAL DAILY
Qty: 30 CAPSULE | Refills: 0 | Status: SHIPPED | OUTPATIENT
Start: 2025-02-21 | End: 2025-03-23

## 2025-02-21 RX ORDER — MORPHINE SULFATE 2 MG/ML
4 INJECTION, SOLUTION INTRAMUSCULAR; INTRAVENOUS
Refills: 0 | Status: COMPLETED | OUTPATIENT
Start: 2025-02-21 | End: 2025-02-21

## 2025-02-21 RX ADMIN — ONDANSETRON 4 MG: 2 INJECTION INTRAMUSCULAR; INTRAVENOUS at 07:02

## 2025-02-21 RX ADMIN — SODIUM CHLORIDE 1000 ML: 9 INJECTION, SOLUTION INTRAVENOUS at 07:02

## 2025-02-21 RX ADMIN — IOHEXOL 75 ML: 350 INJECTION, SOLUTION INTRAVENOUS at 09:02

## 2025-02-21 RX ADMIN — KETOROLAC TROMETHAMINE 30 MG: 30 INJECTION, SOLUTION INTRAMUSCULAR at 09:02

## 2025-02-21 RX ADMIN — MORPHINE SULFATE 4 MG: 2 INJECTION, SOLUTION INTRAMUSCULAR; INTRAVENOUS at 07:02

## 2025-02-22 NOTE — ED TRIAGE NOTES
49 y.o. male presents to ER ED 02/ED 02A   Chief Complaint   Patient presents with    Abdominal Pain     Lower abdominal pain starting this morning now radiating to back w/ increase in severity, denies any acute dysuria or n/v/d/c, but states he has chronic diarrhea

## 2025-02-22 NOTE — ED NOTES
Pt provided a urine specimen cup, castile soap towelette wipe, and instructions for [facility]. Pt verbalizes understanding of a clean catch collection. Will continue to monitor.  Pt informed of NPO order placed by MD. Jared TATUM.

## 2025-02-22 NOTE — ED PROVIDER NOTES
Encounter Date: 2/21/2025       History     Chief Complaint   Patient presents with    Abdominal Pain     Lower abdominal pain starting this morning now radiating to back w/ increase in severity, denies any acute dysuria or n/v/d/c, but states he has chronic diarrhea     History of Present Illness  Luis Daniel Wynne Jr. is a 49 y.o. male with PMH of depression, fibromyalgia,   peptic ulcer disease presents to the ED for evaluation of abdominal pain. Patient   presents with lower abdominal pain that started acutely this a.m.. Pain is described   as sharp, currently 10/10 in severity, radiation into lower back. Denies alleviating or   exacerbating factors.  Denies associated nausea, vomiting, fever, UTI symptoms,   hematuria, or history of kidney stones.  He reports that he has chronic diarrhea;   denies blood or mucus in stool.    The history is provided by the patient.     Review of patient's allergies indicates:  No Known Allergies  Past Medical History:   Diagnosis Date    Depression     Fibromyalgia     Marijuana use     Nicotine addiction 06/15/2022    Uses nicorette lozenges       Peptic ulcer     Scoliosis     Tobacco use 06/02/2023     Past Surgical History:   Procedure Laterality Date    COLONOSCOPY N/A 4/5/2023    Procedure: COLONOSCOPY sutab;  Surgeon: Angelo Salgado MD;  Location: [facility];  Service: Endoscopy;  Laterality: N/A;    ESOPHAGOGASTRODUODENOSCOPY N/A 4/5/2023    Procedure: EGD (ESOPHAGOGASTRODUODENOSCOPY);  Surgeon: Angelo Salgado MD;  Location: [facility];  Service: Endoscopy;  Laterality: N/A;    KNEE ARTHROSCOPY       Family History   Problem Relation Name Age of Onset    No Known Problems Mother      No Known Problems Father      Heart disease Neg Hx      Hypertension Neg Hx       Social History[1]    Review of Systems   Constitutional:  Negative for appetite change, chills, diaphoresis, fatigue and fever.   HENT:  Negative for congestion, ear discharge, ear pain, hearing loss,  postnasal drip, rhinorrhea, sore throat and tinnitus.    Eyes:  Negative for pain and redness.   Respiratory:  Negative for cough, chest tightness, shortness of breath and wheezing.    Cardiovascular:  Negative for chest pain.   Gastrointestinal:  Positive for abdominal pain and diarrhea. Negative for abdominal distention, constipation, nausea and rectal pain.   Genitourinary:  Negative for dysuria, flank pain, hematuria and urgency.   Musculoskeletal:  Negative for arthralgias, back pain, neck pain and neck stiffness.   Skin:  Negative for rash.   Neurological:  Negative for dizziness, seizures, weakness, numbness and headaches.   Hematological:  Does not bruise/bleed easily.   Psychiatric/Behavioral:  Negative for confusion, decreased concentration and dysphoric mood.    All other systems reviewed and are negative.      Physical Exam     Initial Vitals [02/21/25 1914]   BP Pulse Resp Temp SpO2   (!) 170/96 84 16 98.6 °F (37 °C) (!) 94 %      MAP       --         Physical Exam    Nursing note and vitals reviewed.  Constitutional: Vital signs are normal. He appears well-developed and well-nourished. He is cooperative.   HENT:   Head: Normocephalic and atraumatic. Mouth/Throat: Uvula is midline and mucous membranes are normal.   Eyes: Conjunctivae, EOM and lids are normal. Pupils are equal, round, and reactive to light.   Neck: Neck supple.   Normal range of motion.   Full passive range of motion without pain.     Cardiovascular:  Normal rate, regular rhythm, S1 normal, S2 normal, normal heart sounds, intact distal pulses and normal pulses.           Pulmonary/Chest: Effort normal and breath sounds normal.   Abdominal: Abdomen is soft and flat. Bowel sounds are normal. There is abdominal tenderness in the right lower quadrant, suprapubic area and left lower quadrant. There is guarding.   Musculoskeletal:         General: Normal range of motion.      Cervical back: Full passive range of motion without pain, normal  range of motion and neck supple.     Neurological: He is alert and oriented to person, place, and time. He has normal strength.   Skin: Skin is warm, dry and intact. Capillary refill takes less than 2 seconds.   Psychiatric: He has a normal mood and affect. His behavior is normal. Judgment and thought content normal.         ED Course   Procedures  Labs Reviewed   CBC W/ AUTO DIFFERENTIAL - Abnormal       Result Value    WBC 10.14      RBC 4.95      Hemoglobin 15.2      Hematocrit 43.1      MCV 87      MCH 30.7      MCHC 35.3      RDW 12.4      Platelets 255      MPV 10.0      Immature Granulocytes 0.3      Gran # (ANC) 8.1 (*)     Immature Grans (Abs) 0.03      Lymph # 1.4      Mono # 0.5      Eos # 0.1      Baso # 0.04      nRBC 0      Gran % 79.4 (*)     Lymph % 13.8 (*)     Mono % 4.8      Eosinophil % 1.3      Basophil % 0.4      Differential Method Automated     COMPREHENSIVE METABOLIC PANEL - Abnormal    Sodium 140      Potassium 3.8      Chloride 106      CO2 23      Glucose 120 (*)     BUN 13      Creatinine 1.4      Calcium 9.3      Total Protein 7.6      Albumin 4.4      Total Bilirubin 1.0      Alkaline Phosphatase 89      AST 18      ALT 31      eGFR >60      Anion Gap 11     URINALYSIS, REFLEX TO URINE CULTURE - Abnormal    Specimen UA Urine, Clean Catch      Color, UA Red (*)     Appearance, UA Cloudy (*)     pH, UA 7.0      Specific Gravity, UA 1.025      Protein, UA 3+ (*)     Glucose, UA Negative      Ketones, UA Trace (*)     Bilirubin (UA) 1+ (*)     Occult Blood UA 3+ (*)     Nitrite, UA Negative      Urobilinogen, UA Negative      Leukocytes, UA Negative      Narrative:     Specimen Source->Urine   URINALYSIS MICROSCOPIC - Abnormal    RBC, UA >100 (*)     WBC, UA 9 (*)     Bacteria Few (*)     Hyaline Casts, UA 1      Microscopic Comment Mucus present.      Narrative:     Specimen Source->Urine   LIPASE    Lipase 34     TROPONIN I    Troponin I <0.006            Imaging Results              CT  Abdomen Pelvis With IV Contrast NO Oral Contrast (Final result)  Result time 02/21/25 22:21:42      Final result by Hernandez Horvath MD (02/21/25 22:21:42)                   Impression:      Mild right-sided hydronephrosis secondary to two adjacent calculi within the proximal right ureter measuring up to 0.4 cm.    Mild urinary bladder wall thickening which may relate to nondistention, although correlation with urinalysis to exclude cystitis/infection advised.    Hepatomegaly with hepatic steatosis.  Two indeterminate ill-defined regions of hepatic parenchymal hyperattenuation/enhancement.  These are nonspecific and could reflect regions of variant perfusion.  Nonemergent hepatic MRI follow-up could be performed for more definitive assessment as clinically warranted.    Cholelithiasis without evidence of acute cholecystitis.    Chronic L1 compression deformity.    Additional findings as above.      Electronically signed by: Hernandez Horvath MD  Date:    02/21/2025  Time:    22:21               Narrative:    EXAMINATION:  CT ABDOMEN PELVIS WITH IV CONTRAST    CLINICAL HISTORY:  Abdominal pain, acute, nonlocalized;    TECHNIQUE:  Low dose axial images, sagittal and coronal reformations were obtained from the lung bases to the pubic symphysis following the IV administration of 75 mL of Omnipaque 350 .  Oral contrast was not given.    COMPARISON:  CT 08/18/2023    FINDINGS:  There is mild bibasilar atelectasis at the visualized lung bases.  No significant pleural fluid.  The visualized portions of the heart appear normal.    The liver is mildly enlarged measuring 17.5 cm.  There is diffuse decreased attenuation of the hepatic parenchyma suggesting hepatic steatosis.  There are two indeterminate ill-defined regions of parenchymal hyperattenuation/enhancement within hepatic segment IV measuring up to 1.6 cm.  There is a small calculus within the gallbladder lumen.  No evidence to suggest acute cholecystitis.  There is  no intra-or extrahepatic biliary ductal dilatation.    The stomach, spleen, pancreas, and adrenal glands are unremarkable.    There is mild right-sided hydronephrosis secondary to two adjacent calculi within the proximal right ureter measuring up to 0.4 cm.  No significant left-sided hydronephrosis.  Mild nonspecific bilateral perinephric edema.  Urinary bladder demonstrates mild circumferential wall thickening. Prostate appears within normal limits.    The abdominal aorta is normal in course and caliber without significant atherosclerotic calcifications.    The visualized loops of small and large bowel show no evidence of obstruction or inflammation. There is no CT evidence of acute appendicitis. There is no ascites, free fluid, or intraperitoneal free air. There are scattered shotty small mesenteric and retroperitoneal lymph nodes. There is a small fat containing umbilical hernia.    Osseous structures demonstrate a stable compression deformity of the L1 vertebral body.  There are mild multilevel degenerative change of the remaining spine.  The extraperitoneal soft tissues are unremarkable.                                       Medications   0.9% NaCl infusion (0 mLs Intravenous Stopped 2/21/25 2059)   morphine injection 4 mg (4 mg Intravenous Given 2/21/25 1955)   ondansetron injection 4 mg (4 mg Intravenous Given 2/21/25 1959)   iohexoL (OMNIPAQUE 350) injection 75 mL (75 mLs Intravenous Given 2/21/25 2120)   ketorolac injection 30 mg (30 mg Intravenous Given 2/21/25 2129)     Medical Decision Making  Differential includes kidney stone, pyelonephritis, urinary tract infection, back pain  Differential also includes diverticulitis, appendicitis, colitis, gallbladder disease    Problems Addressed:  Kidney stones, calcium oxalate: complicated acute illness or injury    Amount and/or Complexity of Data Reviewed  Labs: ordered. Decision-making details documented in ED Course.  Radiology: ordered and independent  interpretation performed.    ED Management & Risks of Complication, Morbidity, & Mortality:  Lab work within normal limits, urinalysis within normal  CT scan showed kidney stone on ER evaluation tonight  Pain control with IV Toradol & morphine in the emergency room tonight  Ambulatory referral to Urology on ER discharge tonight  Toradol Norco & Flomax prescribed on ER discharge tonight  Pt/Family counseled to return to the ER with any concerns on DC    Need for Hospitalization or Surgery with Social Determinants of Health:  This patient does not need to be hospitalized on ER evaluation today  The patient's diagnosis is not limited by social determinants of health  Does not require surgery or procedure (major or minor), no risk factors    Clinical Impression:  Final diagnoses:  [N20.0] Kidney stones, calcium oxalate (Primary)          ED Disposition Condition    Discharge Stable          ED Prescriptions       Medication Sig Dispense Start Date End Date Auth. Provider    ketorolac (TORADOL) 10 mg tablet Take 1 tablet (10 mg total) by mouth every 6 (six) hours as needed for Pain. 15 tablet 2025 -- Shoaib Pritchard MD    HYDROcodone-acetaminophen (NORCO) 5-325 mg per tablet Take 1 tablet by mouth every 4 (four) hours as needed for Pain. 15 tablet 2025 Shoaib Pritchard MD    tamsulosin (FLOMAX) 0.4 mg Cap Take 1 capsule (0.4 mg total) by mouth once daily. 30 capsule 2025 3/23/2025 Shoaib Pritchard MD          Follow-up Information       Follow up With Specialties Details Why Contact Info    Collin Michaud MD Urology Go in 2 days  [address] [facility] [address] [phone]                   [1]   Social History  Tobacco Use    Smoking status: Former     Current packs/day: 0.00     Average packs/day: 0.5 packs/day for 23.7 years (11.8 ttl pk-yrs)     Types: Cigarettes     Start date:      Quit date: 2016     Years since quittin.4     Passive exposure: Past    Smokeless  tobacco: Current     Types: Chew   Substance Use Topics    Alcohol use: Not Currently    Drug use: Not Currently     Types: Marijuana        Shoaib Pritchard MD  02/21/25 [id number]

## 2025-02-22 NOTE — ED TRIAGE NOTES
49 y.o. male presents to ER ED 02/ED 02A   Chief Complaint   Patient presents with    Abdominal Pain     Lower abdominal pain starting this morning now radiating to back w/ increase in severity, denies any acute dysuria or n/v/d/c, but states he has chronic diarrhea   Pt reports pain 9/10 and denies any home interventions pta.

## 2025-02-23 LAB
OHS QRS DURATION: 92 MS
OHS QTC CALCULATION: 399 MS

## 2025-02-26 NOTE — PROGRESS NOTES
The patient location is: [place]  The chief complaint leading to consultation is: diarrhea    Visit type: audiovisual    Face to Face time with patient:  19   minutes of total time spent on the encounter, which includes face to face time and non-face to face time preparing to see the patient (eg, review of tests), Obtaining and/or reviewing separately obtained history, Documenting clinical information in the electronic or other health record, Independently interpreting results (not separately reported) and communicating results to the patient/family/caregiver, or Care coordination (not separately reported).         Each patient to whom he or she provides medical services by telemedicine is:  (1) informed of the relationship between the physician and patient and the respective role of any other health care provider with respect to management of the patient; and (2) notified that he or she may decline to receive medical services by telemedicine and may withdraw from such care at any time.    Notes:   CC: followup diarrhea and seizure-like activity  HPI:  The patient is a 49 y.o. year old male with current moderate episode of major depressive disorder who presents to the office for followup of diarrhea and seizure-like activity.  He reports no change in his symptoms.  He reports onset of symptoms with every full moon.  He states EEG shows seizure-like activity.  His symptoms last about a week after a full moon.  Reports blurred vision, diarrhea, insomnia, mental fog, diffuse itching and craving sugar.  He reports diarrhea in insomnia or constant.  The patient is concerned he may have a parasite in his requesting ivermectin.  He reports he researched that the body's production of more serotonin during a full moon, alone with his sugar craving, that this Foster's a favorable environment for parasites.  He reports no relief from seizure medications.  He is scheduled to go to the EM- epilepsy monitoring unit in April.   He reports difficulty finding someone to stay with him for 24 hours a day for an entire week.  He states he experiences approximately 4 diarrheal stools a day, primarily in the morning.  The patient reports he collected dung beetles for the insect barium about 3 years ago.  He states he use wild animal feces to collect the dung beetles, and his symptoms started shortly thereafter.  He denies seeing any abnormalities in his stool directly.  The patient does report that he has been seeing a therapist for depression..      PAST MEDICAL HISTORY:  Past Medical History:   Diagnosis Date    Depression     Fibromyalgia     Marijuana use     Nicotine addiction 06/15/2022    Uses nicorette lozenges       Peptic ulcer     Scoliosis     Tobacco use 06/02/2023       SURGICAL HISTORY:  Past Surgical History:   Procedure Laterality Date    COLONOSCOPY N/A 4/5/2023    Procedure: COLONOSCOPY sutab;  Surgeon: Angelo Salgado MD;  Location: [facility];  Service: Endoscopy;  Laterality: N/A;    ESOPHAGOGASTRODUODENOSCOPY N/A 4/5/2023    Procedure: EGD (ESOPHAGOGASTRODUODENOSCOPY);  Surgeon: Angelo Salgado MD;  Location: [facility];  Service: Endoscopy;  Laterality: N/A;    KNEE ARTHROSCOPY         MEDS:  Medcard reviewed and updated    ALLERGIES: Allergy Card reviewed and updated    SOCIAL HISTORY:   The patient is a nonsmoker.    PE:   APPEARANCE: Well nourished, well developed, in no acute distress.    Video visit   PSYCHIATRIC: The patient is oriented to person, place, and time and has a pleasant affect.        ASSESSMENT/PLAN:  Diagnoses and all orders for this visit:    Diarrhea, unspecified type  -     Stool Exam-Ova,Cysts,Parasites; Future  -     Cancel: Ambulatory referral/consult to Infectious Disease; Future  -     Ambulatory referral/consult to Infectious Disease; Future    Current moderate episode of major depressive disorder without prior episode  -     stable, followed by a therapist

## 2025-02-27 ENCOUNTER — OFFICE VISIT (OUTPATIENT)
Dept: INTERNAL MEDICINE | Facility: CLINIC | Age: 50
End: 2025-02-27
Payer: COMMERCIAL

## 2025-02-27 ENCOUNTER — TELEPHONE (OUTPATIENT)
Dept: INTERNAL MEDICINE | Facility: CLINIC | Age: 50
End: 2025-02-27
Payer: COMMERCIAL

## 2025-02-27 DIAGNOSIS — R19.7 DIARRHEA, UNSPECIFIED TYPE: Primary | ICD-10-CM

## 2025-02-27 DIAGNOSIS — F32.1 CURRENT MODERATE EPISODE OF MAJOR DEPRESSIVE DISORDER WITHOUT PRIOR EPISODE: ICD-10-CM

## 2025-03-17 ENCOUNTER — TELEPHONE (OUTPATIENT)
Dept: NEUROLOGY | Facility: CLINIC | Age: 50
End: 2025-03-17
Payer: COMMERCIAL

## 2025-03-17 NOTE — TELEPHONE ENCOUNTER
I advise this patient continue to follow up any discussions or concerns with epileptology at this time. I would not be able to advise him on any alternative therapies for his epilepsy.

## 2025-03-17 NOTE — TELEPHONE ENCOUNTER
"Contacted patient per  to obtain further information prior to virtual visit as patient is under care of  with [facility]. Patient desires to discuss "trying alternative medications" and inform  did not have seizure activity with full moon this past week, states has seizures "every month with the full moon," believes it is due to using Ivermectin. Patient states canceled EMU due to unable to schedule during "full moon and it doesn't pay for me to go if I'm not going to have a seizure while I'm there." Patient states in the process of scheduling appointment with [facility] for seizure treatment. Notified patient message routed to . Patient verbalized understanding.   "

## 2025-03-19 ENCOUNTER — PATIENT MESSAGE (OUTPATIENT)
Dept: NEUROLOGY | Facility: CLINIC | Age: 50
End: 2025-03-19
Payer: COMMERCIAL

## 2025-03-19 ENCOUNTER — PATIENT MESSAGE (OUTPATIENT)
Dept: INTERNAL MEDICINE | Facility: CLINIC | Age: 50
End: 2025-03-19
Payer: COMMERCIAL

## 2025-03-19 DIAGNOSIS — R56.9 SEIZURE-LIKE ACTIVITY: ICD-10-CM

## 2025-03-19 DIAGNOSIS — G40.909 SEIZURE DISORDER: Primary | ICD-10-CM

## 2025-03-19 NOTE — TELEPHONE ENCOUNTER
Pt requesting a referral to neuro at [facility] for seizures. Pt states he wants to find a Dr outside of [facility]. Pended if you feel appropriate     LOV with Zenobia Dunbar MD , 2/27/2025

## 2025-03-20 ENCOUNTER — PATIENT MESSAGE (OUTPATIENT)
Dept: INTERNAL MEDICINE | Facility: CLINIC | Age: 50
End: 2025-03-20
Payer: COMMERCIAL

## 2025-03-21 NOTE — TELEPHONE ENCOUNTER
LOV with Zenobia Dunbar MD , 2/27/2025    Please see message dated 3/19/25 patient following up on neurology referral

## 2025-03-26 ENCOUNTER — PATIENT MESSAGE (OUTPATIENT)
Dept: INTERNAL MEDICINE | Facility: CLINIC | Age: 50
End: 2025-03-26
Payer: COMMERCIAL

## 2025-03-31 ENCOUNTER — TELEPHONE (OUTPATIENT)
Dept: NEUROLOGY | Facility: CLINIC | Age: 50
End: 2025-03-31
Payer: COMMERCIAL

## 2025-04-01 ENCOUNTER — TELEPHONE (OUTPATIENT)
Dept: NEUROLOGY | Facility: CLINIC | Age: 50
End: 2025-04-01
Payer: COMMERCIAL

## 2025-04-01 NOTE — TELEPHONE ENCOUNTER
Patient cancelled EMU - this is the 5th time he has canceled in 2 years without ever admitting. He understands we cannot continue to schedule and reschedule. He says he is following up with neurology at  but if he needs more in depth work regarding his events he will reach out to us.  I let him know I would be canceling the EMU order and alert his medical team. V/U. I have canceled the admission with Violet in admitting

## 2025-04-15 ENCOUNTER — OFFICE VISIT (OUTPATIENT)
Dept: UROLOGY | Facility: CLINIC | Age: 50
End: 2025-04-15
Attending: SPECIALIST
Payer: COMMERCIAL

## 2025-04-15 VITALS
WEIGHT: 183.63 LBS | HEIGHT: 70 IN | BODY MASS INDEX: 26.29 KG/M2 | DIASTOLIC BLOOD PRESSURE: 88 MMHG | SYSTOLIC BLOOD PRESSURE: 120 MMHG

## 2025-04-15 DIAGNOSIS — N20.1 RIGHT URETERAL CALCULUS: Primary | ICD-10-CM

## 2025-04-15 PROCEDURE — 99999 PR PBB SHADOW E&M-EST. PATIENT-LVL III: CPT | Mod: PBBFAC,,, | Performed by: SPECIALIST

## 2025-04-15 NOTE — PROGRESS NOTES
[facility] - Urology   Clinic Note    SUBJECTIVE:     Chief Complaint   Patient presents with    Nephrolithiasis       Referral from: No ref. provider found.    History of Present Illness:  Luis Daniel Wynne Jr. is a 49 y.o. male who presents to clinic for right ureteral stone(s).    Patient is a very pleasant 49-year-old gentleman with no prior history of nephrolithiasis who recently presented to the emergency room with right renal colic.  CT scan was consistent with 1 or 2 calculi measuring roughly 2 x 4 mm of the right proximal ureter.  CT scan was performed roughly 2 months ago.  He has not been routinely straining his urine.  He did not have any pain for the past 2-3 weeks.  It is unclear whether he had spontaneously pass the stones.    Patient endorses no additional complaints at this time.    Past Medical History:   Diagnosis Date    Depression     Fibromyalgia     Marijuana use     Nicotine addiction 06/15/2022    Uses nicorette lozenges       Peptic ulcer     Scoliosis     Tobacco use 06/02/2023       Past Surgical History:   Procedure Laterality Date    COLONOSCOPY N/A 4/5/2023    Procedure: COLONOSCOPY sutab;  Surgeon: Angelo Salgado MD;  Location: [facility];  Service: Endoscopy;  Laterality: N/A;    ESOPHAGOGASTRODUODENOSCOPY N/A 4/5/2023    Procedure: EGD (ESOPHAGOGASTRODUODENOSCOPY);  Surgeon: Angelo Salgado MD;  Location: [facility];  Service: Endoscopy;  Laterality: N/A;    KNEE ARTHROSCOPY         Family History   Problem Relation Name Age of Onset    No Known Problems Mother      No Known Problems Father      Heart disease Neg Hx      Hypertension Neg Hx         Social History[1]    Medications Ordered Prior to Encounter[2]    Review of patient's allergies indicates:  No Known Allergies    ROS     Review of Systems:  A review of 10+ systems was conducted with pertinent positive and negative findings documented in HPI with all other systems reviewed and negative.    OBJECTIVE:  "    Estimated body mass index is 26.35 kg/m² as calculated from the following:    Height as of this encounter: 5' 10" (1.778 m).    Weight as of this encounter: 83.3 kg (183 lb 10.3 oz).    Vital Signs (Most Recent)  Vitals:    04/15/25 1103   BP: 120/88       Physical Exam:    Physical Exam     GENERAL: patient sitting comfortably  HEENT: normocephalic  NECK: supple, no JVD  PULM: normal chest rise, no increased WOB  HEART: non-diaphoretic  EXT: no bruising or edema  NEURO: grossly normal with no focal deficits  PSYCH: appropriate mood and affect    Genitourinary Exam:  deferred      LABS:     Lab Results   Component Value Date    BUN 13 02/21/2025    CREATININE 1.4 02/21/2025    WBC 10.14 02/21/2025    HGB 15.2 02/21/2025    HCT 43.1 02/21/2025     02/21/2025    AST 18 02/21/2025    ALT 31 02/21/2025    ALKPHOS 89 02/21/2025    ALBUMIN 4.4 02/21/2025    HGBA1C 5.1 06/26/2024        Urinalysis:   No results found for: "UAREFLEX"     PSA:  No results found for: "PSA", "PSADIAG", "PSATOTAL", "PSAFREE"    Testosterone:  Lab Results   Component Value Date    TOTALTESTOST >1500 (H) 09/16/2022    TESTOSTERONE 333 06/26/2024    TESTOSTERONE 43.7 (L) 06/26/2024        Imaging:  I have personally reviewed all relevant imaging studies.    Results for orders placed or performed during the hospital encounter of 02/21/25 (from the past 2160 hours)   CT Abdomen Pelvis With IV Contrast NO Oral Contrast    Narrative    EXAMINATION:  CT ABDOMEN PELVIS WITH IV CONTRAST    CLINICAL HISTORY:  Abdominal pain, acute, nonlocalized;    TECHNIQUE:  Low dose axial images, sagittal and coronal reformations were obtained from the lung bases to the pubic symphysis following the IV administration of 75 mL of Omnipaque 350 .  Oral contrast was not given.    COMPARISON:  CT 08/18/2023    FINDINGS:  There is mild bibasilar atelectasis at the visualized lung bases.  No significant pleural fluid.  The visualized portions of the heart appear " normal.    The liver is mildly enlarged measuring 17.5 cm.  There is diffuse decreased attenuation of the hepatic parenchyma suggesting hepatic steatosis.  There are two indeterminate ill-defined regions of parenchymal hyperattenuation/enhancement within hepatic segment IV measuring up to 1.6 cm.  There is a small calculus within the gallbladder lumen.  No evidence to suggest acute cholecystitis.  There is no intra-or extrahepatic biliary ductal dilatation.    The stomach, spleen, pancreas, and adrenal glands are unremarkable.    There is mild right-sided hydronephrosis secondary to two adjacent calculi within the proximal right ureter measuring up to 0.4 cm.  No significant left-sided hydronephrosis.  Mild nonspecific bilateral perinephric edema.  Urinary bladder demonstrates mild circumferential wall thickening. Prostate appears within normal limits.    The abdominal aorta is normal in course and caliber without significant atherosclerotic calcifications.    The visualized loops of small and large bowel show no evidence of obstruction or inflammation. There is no CT evidence of acute appendicitis. There is no ascites, free fluid, or intraperitoneal free air. There are scattered shotty small mesenteric and retroperitoneal lymph nodes. There is a small fat containing umbilical hernia.    Osseous structures demonstrate a stable compression deformity of the L1 vertebral body.  There are mild multilevel degenerative change of the remaining spine.  The extraperitoneal soft tissues are unremarkable.      Impression    Mild right-sided hydronephrosis secondary to two adjacent calculi within the proximal right ureter measuring up to 0.4 cm.    Mild urinary bladder wall thickening which may relate to nondistention, although correlation with urinalysis to exclude cystitis/infection advised.    Hepatomegaly with hepatic steatosis.  Two indeterminate ill-defined regions of hepatic parenchymal hyperattenuation/enhancement.   These are nonspecific and could reflect regions of variant perfusion.  Nonemergent hepatic MRI follow-up could be performed for more definitive assessment as clinically warranted.    Cholelithiasis without evidence of acute cholecystitis.    Chronic L1 compression deformity.    Additional findings as above.      Electronically signed by: Hernandez Horvath MD  Date:    02/21/2025  Time:    22:21     No results found for this or any previous visit (from the past 2160 hours).  CT Abdomen Pelvis With IV Contrast NO Oral Contrast  Narrative: EXAMINATION:  CT ABDOMEN PELVIS WITH IV CONTRAST    CLINICAL HISTORY:  Abdominal pain, acute, nonlocalized;    TECHNIQUE:  Low dose axial images, sagittal and coronal reformations were obtained from the lung bases to the pubic symphysis following the IV administration of 75 mL of Omnipaque 350 .  Oral contrast was not given.    COMPARISON:  CT 08/18/2023    FINDINGS:  There is mild bibasilar atelectasis at the visualized lung bases.  No significant pleural fluid.  The visualized portions of the heart appear normal.    The liver is mildly enlarged measuring 17.5 cm.  There is diffuse decreased attenuation of the hepatic parenchyma suggesting hepatic steatosis.  There are two indeterminate ill-defined regions of parenchymal hyperattenuation/enhancement within hepatic segment IV measuring up to 1.6 cm.  There is a small calculus within the gallbladder lumen.  No evidence to suggest acute cholecystitis.  There is no intra-or extrahepatic biliary ductal dilatation.    The stomach, spleen, pancreas, and adrenal glands are unremarkable.    There is mild right-sided hydronephrosis secondary to two adjacent calculi within the proximal right ureter measuring up to 0.4 cm.  No significant left-sided hydronephrosis.  Mild nonspecific bilateral perinephric edema.  Urinary bladder demonstrates mild circumferential wall thickening. Prostate appears within normal limits.    The abdominal aorta is  normal in course and caliber without significant atherosclerotic calcifications.    The visualized loops of small and large bowel show no evidence of obstruction or inflammation. There is no CT evidence of acute appendicitis. There is no ascites, free fluid, or intraperitoneal free air. There are scattered shotty small mesenteric and retroperitoneal lymph nodes. There is a small fat containing umbilical hernia.    Osseous structures demonstrate a stable compression deformity of the L1 vertebral body.  There are mild multilevel degenerative change of the remaining spine.  The extraperitoneal soft tissues are unremarkable.  Impression: Mild right-sided hydronephrosis secondary to two adjacent calculi within the proximal right ureter measuring up to 0.4 cm.    Mild urinary bladder wall thickening which may relate to nondistention, although correlation with urinalysis to exclude cystitis/infection advised.    Hepatomegaly with hepatic steatosis.  Two indeterminate ill-defined regions of hepatic parenchymal hyperattenuation/enhancement.  These are nonspecific and could reflect regions of variant perfusion.  Nonemergent hepatic MRI follow-up could be performed for more definitive assessment as clinically warranted.    Cholelithiasis without evidence of acute cholecystitis.    Chronic L1 compression deformity.    Additional findings as above.    Electronically signed by: Hernandez Horvath MD  Date:    02/21/2025  Time:    22:21         ASSESSMENT     1. Right ureteral calculus        PLAN:     Increase p.o. fluids, strain urine for stones  Pain medication p.r.n.  Repeat CT scan if symptoms fail to subside over the next month  RTC one month    Collin Michaud MD  Urology  [facility]     Disclaimer: This note has been generated using voice-recognition software. There may be typographical errors that have been missed during proof-reading.          [1]   Social History  Tobacco Use    Smoking status: Former      Current packs/day: 0.00     Average packs/day: 0.5 packs/day for 23.7 years (11.8 ttl pk-yrs)     Types: Cigarettes     Start date:      Quit date: 2016     Years since quittin.6     Passive exposure: Past    Smokeless tobacco: Current     Types: Chew   Substance Use Topics    Alcohol use: Not Currently    Drug use: Not Currently     Types: Marijuana   [2]   Current Outpatient Medications on File Prior to Visit   Medication Sig Dispense Refill    dextroamphetamine-amphetamine (ADDERALL XR) 20 MG 24 hr capsule Take one capsule by mouth every morning (Patient taking differently: Take 20 mg by mouth every morning. As needed but rare) 30 capsule 0    FLUoxetine 40 MG capsule Take 40 mg by mouth once daily.      ketorolac (TORADOL) 10 mg tablet Take 1 tablet (10 mg total) by mouth every 6 (six) hours as needed for Pain. 15 tablet 0    lamoTRIgine (LAMICTAL) 150 MG Tab Take 1 tablet (150 mg total) by mouth 2 (two) times daily. 60 tablet 11    tamsulosin (FLOMAX) 0.4 mg Cap Take 1 capsule (0.4 mg total) by mouth once daily. 30 capsule 0     No current facility-administered medications on file prior to visit.

## 2025-04-16 ENCOUNTER — HOSPITAL ENCOUNTER (EMERGENCY)
Facility: HOSPITAL | Age: 50
Discharge: ANOTHER HOSPITAL | End: 2025-04-17
Attending: STUDENT IN AN ORGANIZED HEALTH CARE EDUCATION/TRAINING PROGRAM
Payer: COMMERCIAL

## 2025-04-16 DIAGNOSIS — R56.9 SEIZURE: ICD-10-CM

## 2025-04-16 LAB
ABSOLUTE EOSINOPHIL (OHS): 0.08 K/UL
ABSOLUTE MONOCYTE (OHS): 0.59 K/UL (ref 0.3–1)
ABSOLUTE NEUTROPHIL COUNT (OHS): 11.02 K/UL (ref 1.8–7.7)
ALBUMIN SERPL BCP-MCNC: 4.1 G/DL (ref 3.5–5.2)
ALP SERPL-CCNC: 91 UNIT/L (ref 40–150)
ALT SERPL W/O P-5'-P-CCNC: 31 UNIT/L (ref 10–44)
AMPHET UR QL SCN: NEGATIVE
ANION GAP (OHS): 11 MMOL/L (ref 8–16)
AST SERPL-CCNC: 21 UNIT/L (ref 11–45)
BACTERIA #/AREA URNS HPF: ABNORMAL /HPF
BARBITURATE SCN PRESENT UR: NEGATIVE
BASOPHILS # BLD AUTO: 0.04 K/UL
BASOPHILS NFR BLD AUTO: 0.3 %
BENZODIAZ UR QL SCN: NEGATIVE
BILIRUB SERPL-MCNC: 0.9 MG/DL (ref 0.1–1)
BILIRUB UR QL STRIP.AUTO: ABNORMAL
BUN SERPL-MCNC: 10 MG/DL (ref 6–20)
CALCIUM SERPL-MCNC: 9.4 MG/DL (ref 8.7–10.5)
CANNABINOIDS UR QL SCN: NEGATIVE
CHLORIDE SERPL-SCNC: 104 MMOL/L (ref 95–110)
CLARITY UR: CLEAR
CO2 SERPL-SCNC: 24 MMOL/L (ref 23–29)
COCAINE UR QL SCN: NEGATIVE
COLOR UR AUTO: ABNORMAL
CREAT SERPL-MCNC: 1.1 MG/DL (ref 0.5–1.4)
CREAT UR-MCNC: 261.4 MG/DL (ref 23–375)
ERYTHROCYTE [DISTWIDTH] IN BLOOD BY AUTOMATED COUNT: 12.5 % (ref 11.5–14.5)
GFR SERPLBLD CREATININE-BSD FMLA CKD-EPI: >60 ML/MIN/1.73/M2
GLUCOSE SERPL-MCNC: 101 MG/DL (ref 70–110)
GLUCOSE UR QL STRIP: NEGATIVE
HCT VFR BLD AUTO: 42.9 % (ref 40–54)
HGB BLD-MCNC: 15.3 GM/DL (ref 14–18)
HGB UR QL STRIP: ABNORMAL
HOLD SPECIMEN: NORMAL
HYALINE CASTS #/AREA URNS LPF: 0 /LPF (ref 0–1)
IMM GRANULOCYTES # BLD AUTO: 0.05 K/UL (ref 0–0.04)
IMM GRANULOCYTES NFR BLD AUTO: 0.4 % (ref 0–0.5)
KETONES UR QL STRIP: NEGATIVE
LEUKOCYTE ESTERASE UR QL STRIP: NEGATIVE
LYMPHOCYTES # BLD AUTO: 1.33 K/UL (ref 1–4.8)
MCH RBC QN AUTO: 31.1 PG (ref 27–31)
MCHC RBC AUTO-ENTMCNC: 35.7 G/DL (ref 32–36)
MCV RBC AUTO: 87 FL (ref 82–98)
METHADONE UR QL SCN: NEGATIVE
MICROSCOPIC COMMENT: ABNORMAL
NITRITE UR QL STRIP: NEGATIVE
NUCLEATED RBC (/100WBC) (OHS): 0 /100 WBC
OPIATES UR QL SCN: NEGATIVE
OTHER ELEMENTS URNS MICRO: ABNORMAL
PCP UR QL: NEGATIVE
PH UR STRIP: 6 [PH]
PLATELET # BLD AUTO: 297 K/UL (ref 150–450)
PMV BLD AUTO: 9.6 FL (ref 9.2–12.9)
POTASSIUM SERPL-SCNC: 4.2 MMOL/L (ref 3.5–5.1)
PROT SERPL-MCNC: 7.7 GM/DL (ref 6–8.4)
PROT UR QL STRIP: ABNORMAL
RBC # BLD AUTO: 4.92 M/UL (ref 4.6–6.2)
RBC #/AREA URNS HPF: 1 /HPF (ref 0–4)
RELATIVE EOSINOPHIL (OHS): 0.6 %
RELATIVE LYMPHOCYTE (OHS): 10.1 % (ref 18–48)
RELATIVE MONOCYTE (OHS): 4.5 % (ref 4–15)
RELATIVE NEUTROPHIL (OHS): 84.1 % (ref 38–73)
SODIUM SERPL-SCNC: 139 MMOL/L (ref 136–145)
SP GR UR STRIP: >=1.03
TSH SERPL-ACNC: 0.41 UIU/ML (ref 0.4–4)
UROBILINOGEN UR STRIP-ACNC: NEGATIVE EU/DL
WBC # BLD AUTO: 13.11 K/UL (ref 3.9–12.7)
WBC #/AREA URNS HPF: 1 /HPF (ref 0–5)

## 2025-04-16 PROCEDURE — 96374 THER/PROPH/DIAG INJ IV PUSH: CPT

## 2025-04-16 PROCEDURE — 99285 EMERGENCY DEPT VISIT HI MDM: CPT | Mod: 25

## 2025-04-16 PROCEDURE — 99900035 HC TECH TIME PER 15 MIN (STAT)

## 2025-04-16 PROCEDURE — 84443 ASSAY THYROID STIM HORMONE: CPT | Performed by: STUDENT IN AN ORGANIZED HEALTH CARE EDUCATION/TRAINING PROGRAM

## 2025-04-16 PROCEDURE — 80175 DRUG SCREEN QUAN LAMOTRIGINE: CPT | Performed by: STUDENT IN AN ORGANIZED HEALTH CARE EDUCATION/TRAINING PROGRAM

## 2025-04-16 PROCEDURE — 81003 URINALYSIS AUTO W/O SCOPE: CPT | Performed by: STUDENT IN AN ORGANIZED HEALTH CARE EDUCATION/TRAINING PROGRAM

## 2025-04-16 PROCEDURE — 93010 ELECTROCARDIOGRAM REPORT: CPT | Mod: ,,, | Performed by: INTERNAL MEDICINE

## 2025-04-16 PROCEDURE — 80307 DRUG TEST PRSMV CHEM ANLYZR: CPT | Performed by: STUDENT IN AN ORGANIZED HEALTH CARE EDUCATION/TRAINING PROGRAM

## 2025-04-16 PROCEDURE — 99900031 HC PATIENT EDUCATION (STAT)

## 2025-04-16 PROCEDURE — 85025 COMPLETE CBC W/AUTO DIFF WBC: CPT | Performed by: STUDENT IN AN ORGANIZED HEALTH CARE EDUCATION/TRAINING PROGRAM

## 2025-04-16 PROCEDURE — 93005 ELECTROCARDIOGRAM TRACING: CPT

## 2025-04-16 PROCEDURE — 63600175 PHARM REV CODE 636 W HCPCS: Performed by: STUDENT IN AN ORGANIZED HEALTH CARE EDUCATION/TRAINING PROGRAM

## 2025-04-16 PROCEDURE — 82040 ASSAY OF SERUM ALBUMIN: CPT | Performed by: STUDENT IN AN ORGANIZED HEALTH CARE EDUCATION/TRAINING PROGRAM

## 2025-04-16 RX ORDER — LEVETIRACETAM 500 MG/5ML
1500 INJECTION, SOLUTION, CONCENTRATE INTRAVENOUS
Status: COMPLETED | OUTPATIENT
Start: 2025-04-16 | End: 2025-04-16

## 2025-04-16 RX ADMIN — LEVETIRACETAM 500 MG: 100 INJECTION, SOLUTION INTRAVENOUS at 10:04

## 2025-04-17 ENCOUNTER — HOSPITAL ENCOUNTER (INPATIENT)
Facility: HOSPITAL | Age: 50
LOS: 1 days | Discharge: HOME | DRG: 101 | End: 2025-04-17
Attending: STUDENT IN AN ORGANIZED HEALTH CARE EDUCATION/TRAINING PROGRAM | Admitting: FAMILY MEDICINE
Payer: COMMERCIAL

## 2025-04-17 VITALS
HEART RATE: 74 BPM | BODY MASS INDEX: 25.34 KG/M2 | TEMPERATURE: 99 F | RESPIRATION RATE: 18 BRPM | OXYGEN SATURATION: 95 % | WEIGHT: 177 LBS | HEIGHT: 70 IN | DIASTOLIC BLOOD PRESSURE: 72 MMHG | SYSTOLIC BLOOD PRESSURE: 123 MMHG

## 2025-04-17 VITALS
OXYGEN SATURATION: 95 % | HEART RATE: 75 BPM | WEIGHT: 180 LBS | BODY MASS INDEX: 25.83 KG/M2 | RESPIRATION RATE: 16 BRPM | SYSTOLIC BLOOD PRESSURE: 116 MMHG | DIASTOLIC BLOOD PRESSURE: 65 MMHG | TEMPERATURE: 99 F

## 2025-04-17 DIAGNOSIS — R56.9 UNCONTROLLED SEIZURES: ICD-10-CM

## 2025-04-17 DIAGNOSIS — R07.9 CHEST PAIN: ICD-10-CM

## 2025-04-17 LAB
ABSOLUTE EOSINOPHIL (OHS): 0.31 K/UL
ABSOLUTE MONOCYTE (OHS): 0.98 K/UL (ref 0.3–1)
ABSOLUTE NEUTROPHIL COUNT (OHS): 8.38 K/UL (ref 1.8–7.7)
ANION GAP (OHS): 11 MMOL/L (ref 8–16)
BASOPHILS # BLD AUTO: 0.06 K/UL
BASOPHILS NFR BLD AUTO: 0.5 %
BUN SERPL-MCNC: 10 MG/DL (ref 6–20)
CALCIUM SERPL-MCNC: 9.2 MG/DL (ref 8.7–10.5)
CHLORIDE SERPL-SCNC: 105 MMOL/L (ref 95–110)
CO2 SERPL-SCNC: 23 MMOL/L (ref 23–29)
CREAT SERPL-MCNC: 1 MG/DL (ref 0.5–1.4)
ERYTHROCYTE [DISTWIDTH] IN BLOOD BY AUTOMATED COUNT: 12.6 % (ref 11.5–14.5)
GFR SERPLBLD CREATININE-BSD FMLA CKD-EPI: >60 ML/MIN/1.73/M2
GLUCOSE SERPL-MCNC: 95 MG/DL (ref 70–110)
HCT VFR BLD AUTO: 42.5 % (ref 40–54)
HGB BLD-MCNC: 14.8 GM/DL (ref 14–18)
IMM GRANULOCYTES # BLD AUTO: 0.05 K/UL (ref 0–0.04)
IMM GRANULOCYTES NFR BLD AUTO: 0.4 % (ref 0–0.5)
INR PPP: 0.9 (ref 0.8–1.2)
LYMPHOCYTES # BLD AUTO: 2.53 K/UL (ref 1–4.8)
MAGNESIUM SERPL-MCNC: 2 MG/DL (ref 1.6–2.6)
MCH RBC QN AUTO: 31 PG (ref 27–31)
MCHC RBC AUTO-ENTMCNC: 34.8 G/DL (ref 32–36)
MCV RBC AUTO: 89 FL (ref 82–98)
NUCLEATED RBC (/100WBC) (OHS): 0 /100 WBC
OHS QRS DURATION: 90 MS
OHS QTC CALCULATION: 400 MS
PLATELET # BLD AUTO: 309 K/UL (ref 150–450)
PMV BLD AUTO: 10 FL (ref 9.2–12.9)
POTASSIUM SERPL-SCNC: 3.3 MMOL/L (ref 3.5–5.1)
PROTHROMBIN TIME: 10.9 SECONDS (ref 9–12.5)
RBC # BLD AUTO: 4.77 M/UL (ref 4.6–6.2)
RELATIVE EOSINOPHIL (OHS): 2.5 %
RELATIVE LYMPHOCYTE (OHS): 20.6 % (ref 18–48)
RELATIVE MONOCYTE (OHS): 8 % (ref 4–15)
RELATIVE NEUTROPHIL (OHS): 68 % (ref 38–73)
SODIUM SERPL-SCNC: 139 MMOL/L (ref 136–145)
WBC # BLD AUTO: 12.31 K/UL (ref 3.9–12.7)

## 2025-04-17 PROCEDURE — 11000001 HC ACUTE MED/SURG PRIVATE ROOM

## 2025-04-17 PROCEDURE — 36415 COLL VENOUS BLD VENIPUNCTURE: CPT | Performed by: FAMILY MEDICINE

## 2025-04-17 PROCEDURE — 80048 BASIC METABOLIC PNL TOTAL CA: CPT | Performed by: FAMILY MEDICINE

## 2025-04-17 PROCEDURE — 85610 PROTHROMBIN TIME: CPT | Performed by: FAMILY MEDICINE

## 2025-04-17 PROCEDURE — 85025 COMPLETE CBC W/AUTO DIFF WBC: CPT | Performed by: FAMILY MEDICINE

## 2025-04-17 PROCEDURE — 83735 ASSAY OF MAGNESIUM: CPT | Performed by: FAMILY MEDICINE

## 2025-04-17 PROCEDURE — 99222 1ST HOSP IP/OBS MODERATE 55: CPT | Mod: ,,, | Performed by: STUDENT IN AN ORGANIZED HEALTH CARE EDUCATION/TRAINING PROGRAM

## 2025-04-17 PROCEDURE — 25000003 PHARM REV CODE 250: Performed by: FAMILY MEDICINE

## 2025-04-17 PROCEDURE — 25000003 PHARM REV CODE 250: Performed by: STUDENT IN AN ORGANIZED HEALTH CARE EDUCATION/TRAINING PROGRAM

## 2025-04-17 RX ORDER — GLUCAGON 1 MG
1 KIT INJECTION
Status: DISCONTINUED | OUTPATIENT
Start: 2025-04-17 | End: 2025-04-17 | Stop reason: HOSPADM

## 2025-04-17 RX ORDER — POTASSIUM CHLORIDE 20 MEQ/1
40 TABLET, EXTENDED RELEASE ORAL ONCE
Status: COMPLETED | OUTPATIENT
Start: 2025-04-17 | End: 2025-04-17

## 2025-04-17 RX ORDER — ACETAMINOPHEN 325 MG/1
650 TABLET ORAL EVERY 6 HOURS PRN
Status: DISCONTINUED | OUTPATIENT
Start: 2025-04-17 | End: 2025-04-17 | Stop reason: HOSPADM

## 2025-04-17 RX ORDER — FLUOXETINE HYDROCHLORIDE 20 MG/1
40 CAPSULE ORAL DAILY
Status: DISCONTINUED | OUTPATIENT
Start: 2025-04-17 | End: 2025-04-17 | Stop reason: HOSPADM

## 2025-04-17 RX ORDER — NALOXONE HCL 0.4 MG/ML
0.02 VIAL (ML) INJECTION
Status: DISCONTINUED | OUTPATIENT
Start: 2025-04-17 | End: 2025-04-17 | Stop reason: HOSPADM

## 2025-04-17 RX ORDER — TAMSULOSIN HYDROCHLORIDE 0.4 MG/1
0.4 CAPSULE ORAL DAILY
Status: DISCONTINUED | OUTPATIENT
Start: 2025-04-17 | End: 2025-04-17 | Stop reason: HOSPADM

## 2025-04-17 RX ORDER — SODIUM CHLORIDE 0.9 % (FLUSH) 0.9 %
10 SYRINGE (ML) INJECTION EVERY 12 HOURS PRN
Status: DISCONTINUED | OUTPATIENT
Start: 2025-04-17 | End: 2025-04-17 | Stop reason: HOSPADM

## 2025-04-17 RX ORDER — IBUPROFEN 200 MG
24 TABLET ORAL
Status: DISCONTINUED | OUTPATIENT
Start: 2025-04-17 | End: 2025-04-17 | Stop reason: HOSPADM

## 2025-04-17 RX ORDER — ONDANSETRON HYDROCHLORIDE 2 MG/ML
4 INJECTION, SOLUTION INTRAVENOUS EVERY 8 HOURS PRN
Status: DISCONTINUED | OUTPATIENT
Start: 2025-04-17 | End: 2025-04-17 | Stop reason: HOSPADM

## 2025-04-17 RX ORDER — IBUPROFEN 200 MG
16 TABLET ORAL
Status: DISCONTINUED | OUTPATIENT
Start: 2025-04-17 | End: 2025-04-17 | Stop reason: HOSPADM

## 2025-04-17 RX ADMIN — POTASSIUM CHLORIDE 40 MEQ: 1500 TABLET, EXTENDED RELEASE ORAL at 10:04

## 2025-04-17 RX ADMIN — LAMOTRIGINE 150 MG: 100 TABLET ORAL at 08:04

## 2025-04-17 NOTE — H&P
"[facility]  History & Physical    Patient Name: Luis Daniel Wynne Jr.  MRN: [id number]  Patient Class: IP- Inpatient  Admission Date: 4/17/2025  Attending Physician: Za Sanches MD   Primary Care Provider: Zenobia Dunbar MD         Patient information was obtained from patient, past medical records, and ER records.     Subjective:     Principal Problem:<principal problem not specified>    Chief Complaint:   Chief Complaint   Patient presents with    Seizures        HPI: Per :  Luis Daniel Wynne Jr is a 49-year-old male with a past medical history of depression, fibromyalgia, THC use, nicotine addiction, PUD, and seizure disorder.  Patient presents to [facility] ED with complaints of worsening seizures over the day.  Per sending MD,  patient presenting after having countless absence seizures throughout the day".  Patient states that he was trying to wean himself off the Lamictal and developed the seizures.  Patient states that his wife got upset and he restarted the medication..  No tonic-clonic seizures today. Family states that they have been attempting to get an appointment at the EMU (epilepsy management unit) without success. They have an appointment next month with a neurologist at [facility]. Patient is currently back to his baseline.  Patient is transferred to [facility] for higher level of care.  At time of my examination patient denied any headache, fever, chills, chest pain or shortness of breath.       Past Medical History:   Diagnosis Date    Depression     Fibromyalgia     Marijuana use     Nicotine addiction 06/15/2022    Uses nicorette lozenges       Peptic ulcer     Scoliosis     Seizures     Tobacco use 06/02/2023       Past Surgical History:   Procedure Laterality Date    COLONOSCOPY N/A 4/5/2023    Procedure: COLONOSCOPY sutab;  Surgeon: Angelo Salgado MD;  Location: [facility];  Service: Endoscopy;  Laterality: N/A;    ESOPHAGOGASTRODUODENOSCOPY N/A " 2023    Procedure: EGD (ESOPHAGOGASTRODUODENOSCOPY);  Surgeon: Angelo Salgado MD;  Location[facility];  Service: Endoscopy;  Laterality: N/A;    KNEE ARTHROSCOPY         Review of patient's allergies indicates:  No Known Allergies    Current Facility-Administered Medications on File Prior to Encounter   Medication    [COMPLETED] levETIRAcetam injection 1,500 mg     Current Outpatient Medications on File Prior to Encounter   Medication Sig    dextroamphetamine-amphetamine (ADDERALL XR) 20 MG 24 hr capsule Take one capsule by mouth every morning (Patient taking differently: Take 20 mg by mouth every morning. As needed but rare)    FLUoxetine 40 MG capsule Take 40 mg by mouth once daily.    ketorolac (TORADOL) 10 mg tablet Take 1 tablet (10 mg total) by mouth every 6 (six) hours as needed for Pain.    lamoTRIgine (LAMICTAL) 150 MG Tab Take 1 tablet (150 mg total) by mouth 2 (two) times daily.    tamsulosin (FLOMAX) 0.4 mg Cap Take 1 capsule (0.4 mg total) by mouth once daily.     Family History       Problem Relation (Age of Onset)    No Known Problems Mother, Father          Tobacco Use    Smoking status: Former     Current packs/day: 0.00     Average packs/day: 0.5 packs/day for 23.7 years (11.8 ttl pk-yrs)     Types: Cigarettes     Start date:      Quit date: 2016     Years since quittin.6     Passive exposure: Past    Smokeless tobacco: Current     Types: Chew   Substance and Sexual Activity    Alcohol use: Not Currently    Drug use: Not Currently     Types: Marijuana    Sexual activity: Yes     Partners: Female     Review of Systems   Constitutional: Negative.    HENT: Negative.     Eyes: Negative.    Respiratory: Negative.     Cardiovascular: Negative.    Gastrointestinal: Negative.    Genitourinary: Negative.    Musculoskeletal: Negative.    Neurological:  Positive for seizures.   Psychiatric/Behavioral:  The patient is nervous/anxious.      Objective:     Vital Signs (Most Recent):  Temp:  98.2 °F (36.8 °C) (04/17/25 0229)  Pulse: 80 (04/17/25 0229)  Resp: 16 (04/17/25 0229)  BP: 132/75 (04/17/25 0229)  SpO2: 96 % (04/17/25 0229) Vital Signs (24h Range):  Temp:  [98.2 °F (36.8 °C)-98.8 °F (37.1 °C)] 98.2 °F (36.8 °C)  Pulse:  [66-80] 80  Resp:  [14-19] 16  SpO2:  [95 %-98 %] 96 %  BP: (112-139)/(65-84) 132/75        There is no height or weight on file to calculate BMI.     Physical Exam  HENT:      Head: Normocephalic and atraumatic.      Nose: Nose normal.      Mouth/Throat:      Mouth: Mucous membranes are moist.   Eyes:      Extraocular Movements: Extraocular movements intact.      Pupils: Pupils are equal, round, and reactive to light.   Cardiovascular:      Rate and Rhythm: Regular rhythm.   Pulmonary:      Breath sounds: Normal breath sounds.   Abdominal:      Palpations: Abdomen is soft.   Musculoskeletal:         General: Normal range of motion.      Cervical back: Neck supple.   Skin:     General: Skin is warm.   Neurological:      General: No focal deficit present.      Mental Status: He is alert.   Psychiatric:         Mood and Affect: Mood normal.              CRANIAL NERVES     CN III, IV, VI   Pupils are equal, round, and reactive to light.       Significant Labs: All pertinent labs within the past 24 hours have been reviewed.  Recent Lab Results         04/16/25 2013 04/16/25 1952        Phencyclidine Negative         Albumin   4.1       ALP   91       ALT   31       Amphetamines, Urine Negative         Anion Gap   11       Appearance, UA Clear         AST   21       Bacteria, UA None         Barbituates, Urine Negative         Baso #   0.04       Basophil %   0.3       Benzodiazepine, Urine Negative         Bilirubin (UA) 1+  Comment: Positive urine bilirubin is not confirmed. Correlate with serum bilirubin and clinical presentation.         BILIRUBIN TOTAL   0.9  Comment: For infants and newborns, interpretation of results should be based   on gestational age, weight and in  agreement with clinical   observations.    Premature Infant recommended reference ranges:   0-24 hours:  <8.0 mg/dL   24-48 hours: <12.0 mg/dL   3-5 days:    <15.0 mg/dL   6-29 days:   <15.0 mg/dL       BUN   10       Calcium   9.4       Chloride   104       CO2   24       Cocaine, Urine Negative         Color, UA Green         Creatinine   1.1       Urine Creatinine 261.4         eGFR   >60  Comment: Estimated GFR calculated using the CKD-EPI creatinine (2021) equation.       Eos #   0.08       Eos %   0.6       Glucose   101       Glucose, UA Negative         Gran # (ANC)   11.02       Hematocrit   42.9       Hemoglobin   15.3       Extra Tube Hold for add-ons.  Comment: Auto resulted.            Hyaline Casts, UA 0         Immature Grans (Abs)   0.05  Comment: Mild elevation in immature granulocytes is non specific and can be seen in a variety of conditions including stress response, acute inflammation, trauma and pregnancy. Correlation with other laboratory and clinical findings is essential.       Immature Granulocytes   0.4       Ketones, UA Negative         Leukocyte Esterase, UA Negative         Lymph #   1.33       Lymph %   10.1       MCH   31.1       MCHC   35.7       MCV   87       Methadone Screen, Urine Negative         Microscopic Comment   Comment: Other formed elements not mentioned in the report are not present in the microscopic examination.         Mono #   0.59       Mono %   4.5       MPV   9.6       Neut %   84.1       NITRITE UA Negative         nRBC   0       Blood, UA Trace         Opiates, Urine Negative         OTHER OBSERVATIONS, UA Occasional  Comment: Sperm present         pH, UA 6.0         Platelet Count   297       Potassium   4.2       PROTEIN TOTAL   7.7       Protein, UA 1+  Comment: Recommend a 24 hour urine protein or a urine protein/creatinine ratio if globulin induced proteinuria is clinically suspected.         RBC   4.92       RBC, UA 1         RDW   12.5       Sodium    139       Spec Grav UA >=1.030         Marijuana (THC) Metabolite Negative         TSH   0.411       Urobilinogen, UA Negative         WBC, UA 1         WBC   13.11               Significant Imaging: I have reviewed all pertinent imaging results/findings within the past 24 hours.  Assessment/Plan:     Assessment & Plan  Seizure    Admit to medical floor with telemetry.    EEG  Seizure precautions  Continue with home dose of Lamictal  Inpatient Neurology consultation    VTE Risk Mitigation (From admission, onward)           Ordered     IP VTE LOW RISK PATIENT  Once         04/17/25 0232     Place sequential compression device  Until discontinued         04/17/25 0232                  Further recommendations, diagnosis and orders will be made by day team hospitalist who will assume care in the morning.                  Lencho Heath MD  Department of Hospital Medicine  [facility]

## 2025-04-17 NOTE — ASSESSMENT & PLAN NOTE
Admit to medical floor with telemetry.    EEG  Seizure precautions  Continue with home dose of Lamictal  Inpatient Neurology consultation

## 2025-04-17 NOTE — PROVIDER TRANSFER
Outside Transfer Acceptance Note / [facility] Referral [facility]    Referring facility: [facility]   Referring provider: ERVIN BALDWIN  Accepting facility: [facility]  Accepting provider: JESSICA MEHTA  Admitting provider: Za Sanches MD   Reason for transfer: Higher Level of Care   Transfer diagnosis: Seizures  Transfer specialty requested: Neurology  Transfer specialty notified: No  Transfer level: NUMBER 1-5: 2  Bed type requested: TELE  Isolation status: No active isolations   Admission class or status: OP- Observation      Narrative     Mr. Wynne is a 48yo man with a past medical history of depression, fibromyalgia, THC use, nicotine addiction, PUD, scoliosis, and seizure disorder.  He is on Lamictal for his seizures.    He now comes to the ED at [facility] ED with worsening seizures over the day.  Per sending MD, presenting after having countless absence seizures throughout the day. His partner is present with him, states that he is only now returned to baseline. No tonic-clonic seizures today. Family state that they have been attempting to get an appointment at the EMU (epilepsy management unit) without success. They have an appointment in the next month with a neurologist at [facility]. They were told by the neurologist in recent, that if he ever had this many seizures, he should come to the emergency department for an EEG.    Per sending MD, he is currently at his baseline level of function and mentation.    VS in the ED were /83   Pulse 68   Temp 98.8 °F (37.1 °C) (Oral)   Resp 19   Wt 81.7 kg (180 lb 0.1 oz)   SpO2 96%   BMI 25.83 kg/m².    Labs: WBC 13, Hg 15.3, , Na 139, Cr 1.1, BUN 10, LFTs NML, TSH 0.411, UDS negative, UA: LE neg, nitrite neg, RBC 1, WBC 1, bact none.    In the ED he was treated with:  Medications   levETIRAcetam injection 1,500 mg (has no administration in time range)       Objective     Vitals:   Temp:  [98.8 °F (37.1 °C)] 98.8 °F (37.1  °C)  Pulse:  [68-78] 68  Resp:  [16-19] 19  SpO2:  [96 %-98 %] 96 %  BP: (127-136)/(77-84) 133/83     Recent Labs: All pertinent labs within the past 24 hours have been reviewed.  Recent Results (from the past 24 hours)   Comprehensive Metabolic Panel (CMP)    Collection Time: 04/16/25  7:52 PM   Result Value Ref Range    Sodium 139 136 - 145 mmol/L    Potassium 4.2 3.5 - 5.1 mmol/L    Chloride 104 95 - 110 mmol/L    CO2 24 23 - 29 mmol/L    Glucose 101 70 - 110 mg/dL    BUN 10 6 - 20 mg/dL    Creatinine 1.1 0.5 - 1.4 mg/dL    Calcium 9.4 8.7 - 10.5 mg/dL    Protein Total 7.7 6.0 - 8.4 gm/dL    Albumin 4.1 3.5 - 5.2 g/dL    Bilirubin Total 0.9 0.1 - 1.0 mg/dL    ALP 91 40 - 150 unit/L    AST 21 11 - 45 unit/L    ALT 31 10 - 44 unit/L    Anion Gap 11 8 - 16 mmol/L    eGFR >60 >60 mL/min/1.73/m2   TSH    Collection Time: 04/16/25  7:52 PM   Result Value Ref Range    TSH 0.411 0.400 - 4.000 uIU/mL   CBC with Differential    Collection Time: 04/16/25  7:52 PM   Result Value Ref Range    WBC 13.11 (H) 3.90 - 12.70 K/uL    RBC 4.92 4.60 - 6.20 M/uL    HGB 15.3 14.0 - 18.0 gm/dL    HCT 42.9 40.0 - 54.0 %    MCV 87 82 - 98 fL    MCH 31.1 (H) 27.0 - 31.0 pg    MCHC 35.7 32.0 - 36.0 g/dL    RDW 12.5 11.5 - 14.5 %    Platelet Count 297 150 - 450 K/uL    MPV 9.6 9.2 - 12.9 fL    Nucleated RBC 0 <=0 /100 WBC    Neut % 84.1 (H) 38 - 73 %    Lymph % 10.1 (L) 18 - 48 %    Mono % 4.5 4 - 15 %    Eos % 0.6 <=8 %    Basophil % 0.3 <=1.9 %    Imm Grans % 0.4 0.0 - 0.5 %    Neut # 11.02 (H) 1.8 - 7.7 K/uL    Lymph # 1.33 1 - 4.8 K/uL    Mono # 0.59 0.3 - 1 K/uL    Eos # 0.08 <=0.5 K/uL    Baso # 0.04 <=0.2 K/uL    Imm Grans # 0.05 (H) 0.00 - 0.04 K/uL   Urinalysis, Reflex to Urine Culture    Collection Time: 04/16/25  8:13 PM    Specimen: Urine, Clean Catch   Result Value Ref Range    Color, UA Green (A) Straw, Sherri, Yellow, Light-Orange    Appearance, UA Clear Clear    pH, UA 6.0 5.0 - 8.0    Spec Grav UA >=1.030 (A) 1.005 - 1.030     Protein, UA 1+ (A) Negative    Glucose, UA Negative Negative    Ketones, UA Negative Negative    Bilirubin, UA 1+ (A) Negative    Blood, UA Trace (A) Negative    Nitrites, UA Negative Negative    Urobilinogen, UA Negative <2.0 EU/dL    Leukocyte Esterase, UA Negative Negative   Drug screen panel, emergency    Collection Time: 04/16/25  8:13 PM   Result Value Ref Range    Benzodiazepine, Urine Negative Negative    Methadone, Urine Negative Negative    Cocaine, Urine Negative Negative    Opiates, Urine Negative Negative    Barbiturates, Urine Negative Negative    Amphetamines, Urine Negative Negative    THC Negative Negative    Phencyclidine, Urine Negative Negative    Urine Creatinine 261.4 23.0 - 375.0 mg/dL   GREY TOP URINE HOLD    Collection Time: 04/16/25  8:13 PM   Result Value Ref Range    Extra Tube Hold for add-ons.    Urinalysis Microscopic    Collection Time: 04/16/25  8:13 PM   Result Value Ref Range    RBC, UA 1 0 - 4 /HPF    WBC, UA 1 0 - 5 /HPF    Bacteria, UA None None, Rare, Occasional /HPF    Hyaline Casts, UA 0 0 - 1 /LPF    Other, UA Occasional (A) None    Microscopic Comment              IV access:        Peripheral IV - Single Lumen 04/16/25 1953 20 G Anterior;Proximal;Right Forearm (Active)   Site Assessment Clean;Dry;Intact 04/16/25 1954   Extremity Assessment Distal to IV No abnormal discoloration;No redness;No swelling 04/16/25 1954   Dressing Status Clean;Dry;Intact 04/16/25 1954   Dressing Intervention First dressing 04/16/25 1954     Infusions: NONE  Allergies: Review of patient's allergies indicates:  No Known Allergies   NPO: No    Anticoagulation:   Anticoagulants       None             Instructions      [facility]  Admit to Hospital Medicine  Upon patient arrival to floor, please contact Hospital Medicine on call.

## 2025-04-17 NOTE — PLAN OF CARE
Cued into the room with permission. Reviewed AVS with patient. Educated on seizure and safety precautions, follow up care, follow up appointments, activity. Patient verbalized understanding.  Patient insist on walking out. Explained safety due to seizures,  and hospital policy. He would not wait for wheelchair. Nurse notified

## 2025-04-17 NOTE — PLAN OF CARE
SW met with pt at bedside to discuss dc planning. All information on chart confirmed. Pt resides in home with his wife and children. Pt is independent in ADLs. Pt has no dme/hh services at home. At time of dc pts wife will provide transport home. SW will continue to follow pt throughout his transitions of care and assist with any dc needs.     Future Appointments   Date Time Provider Department Center   5/27/2025 11:15 AM Collin Michaud MD [facility] UROLOGY [facility]   6/12/2025  8:00 AM Cameron Parisi MD [facility] NEURO [place] [facility]   8/11/2025  7:15 AM Robert Sarabia MD [facility] NEURO [facility]        04/17/25 0912   Discharge Assessment   Assessment Type Discharge Planning Assessment   Confirmed/corrected address, phone number and insurance Yes   Confirmed Demographics Correct on Facesheet   Source of Information patient   Communicated RUBEN with patient/caregiver Yes   People in Home spouse;child(michael), adult;child(michael), dependent   Do you expect to return to your current living situation? Yes   Do you have help at home or someone to help you manage your care at home? Yes   Who are your caregiver(s) and their phone number(s)? Sandy Wynne (Spouse)  [phone]   Prior to hospitilization cognitive status: Alert/Oriented   Current cognitive status: Alert/Oriented   Walking or Climbing Stairs Difficulty no   Dressing/Bathing Difficulty no   Home Accessibility not wheelchair accessible   Home Layout Able to live on 1st floor   Equipment Currently Used at Home none   Readmission within 30 days? No   Patient currently being followed by outpatient case management? No   Do you currently have service(s) that help you manage your care at home? No   Do you take prescription medications? Yes   Do you have prescription coverage? Yes   Coverage [employer]   Do you have any problems affording any of your prescribed medications? No   Is the patient taking medications as  prescribed? yes   Who is going to help you get home at discharge? Sandy Wynne (Spouse)  [phone]   How do you get to doctors appointments? family or friend will provide   Are you on dialysis? No   Do you take coumadin? No   Discharge Plan A Home with family   DME Needed Upon Discharge  none   Discharge Plan discussed with: Patient   Transition of Care Barriers None   Financial Resource Strain   How hard is it for you to pay for the very basics like food, housing, medical care, and heating? Not hard   Housing Stability   In the last 12 months, was there a time when you were not able to pay the mortgage or rent on time? N   At any time in the past 12 months, were you homeless or living in a shelter (including now)? N   Transportation Needs   In the past 12 months, has lack of transportation kept you from medical appointments or from getting medications? no   In the past 12 months, has lack of transportation kept you from meetings, work, or from getting things needed for daily living? No   Food Insecurity   Within the past 12 months, you worried that your food would run out before you got the money to buy more. Never true   Within the past 12 months, the food you bought just didn't last and you didn't have money to get more. Never true   Stress   Do you feel stress - tense, restless, nervous, or anxious, or unable to sleep at night because your mind is troubled all the time - these days? Not at all   Social Isolation   How often do you feel lonely or isolated from those around you?  Never   Alcohol Use   Q1: How often do you have a drink containing alcohol? Never   Q2: How many drinks containing alcohol do you have on a typical day when you are drinking? None   Q3: How often do you have six or more drinks on one occasion? Never   Utilities   In the past 12 months has the electric, gas, oil, or water company threatened to shut off services in your home? No   Health Literacy   How often do you need to have someone  help you when you read instructions, pamphlets, or other written material from your doctor or pharmacy? Never   OTHER   Name(s) of People in Home Wife and children

## 2025-04-17 NOTE — HPI
"Per :  Luis Daniel Wynne Jr is a 49-year-old male with a past medical history of depression, fibromyalgia, THC use, nicotine addiction, PUD, and seizure disorder.  Patient presents to [facility] ED with complaints of worsening seizures over the day.  Per sending MD,  patient presenting after having countless absence seizures throughout the day".  Patient states that he was trying to wean himself off the Lamictal and developed the seizures.  Patient states that his wife got upset and he restarted the medication..  No tonic-clonic seizures today. Family states that they have been attempting to get an appointment at the EMU (epilepsy management unit) without success. They have an appointment next month with a neurologist at [facility]. Patient is currently back to his baseline.  Patient is transferred to [facility] for higher level of care.  At time of my examination patient denied any headache, fever, chills, chest pain or shortness of breath.     "

## 2025-04-17 NOTE — PLAN OF CARE
Pt will dc with no needs noted. Pts family will provide transport home. SW will continue to follow pt throughout his transitions of care and assist with any dc needs.     Cleared from  . Bedside Nurse and VN notified.    Future Appointments   Date Time Provider Department Center   4/24/2025  1:30 PM Shoaib Adkins, BISHOPP-C [facility] IM [place]   5/27/2025 11:15 AM Collin Michaud MD [facility] UROLOGY [facility]   6/12/2025  8:00 AM Cameron aPrisi MD [facility] NEURO [facility]   8/11/2025  7:15 AM Robert Sarabia MD [facility] NEURO [facility]        04/17/25 1306   Final Note   Assessment Type Final Discharge Note   Anticipated Discharge Disposition Home   Hospital Resources/Appts/Education Provided Appointments scheduled and added to AVS   Post-Acute Status   Discharge Delays None known at this time

## 2025-04-17 NOTE — NURSING
VIRTUAL NURSE: Pt arrived to unit. Permission received to turn camera to view patient. VIP model explained  Admission questions completed.  Plan of care reviewed with patient.  Educated patient on fall risk/POC. Safety precautions in place. Call light within reach, side rails up x2. Patient instucted to ask staff for assistance.     04/17/25 0258   Admission Complete   Admission Complete by VN Complete
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
## 2025-04-17 NOTE — PLAN OF CARE
Problem: Adult Inpatient Plan of Care  Goal: Patient-Specific Goal (Individualized)  Outcome: Progressing     Problem: Fall Injury Risk  Goal: Absence of Fall and Fall-Related Injury  Outcome: Progressing     Problem: Seizure, Active Management  Goal: Absence of Seizure/Seizure-Related Injury  Outcome: Progressing     AAOx4. Seizure precautions in place. Safety precautions maintained. Call bell within reach.

## 2025-04-17 NOTE — ED PROVIDER NOTES
Encounter Date: 4/16/2025       History     Chief Complaint   Patient presents with    Seizures     Wife reports multiple absence seizures today. Increasing and frequency duration for last several days. Pt currently AAO. Compliant with meds. Requesting EEG.      49-year-old male with history of fibromyalgia myalgia, seizures (patient had EEG showing positive seizure activity) presenting after having countless absence seizures throughout the day.  His partner is present with him, states that he is only now returned to baseline.  No tonic-clonic seizures today.  Family state that they have been attempting to get an appointment at the EMU (epilepsy management unit) without success.  They have an appointment in the next month with a neurologist at [facility].  They were told by the neurologist in recent, that if he ever had this many seizures, he should come to the emergency department for an EEG.  Patient takes Lamictal.  Reports that his seizures occur monthly.      Review of patient's allergies indicates:  No Known Allergies  Past Medical History:   Diagnosis Date    Depression     Fibromyalgia     Marijuana use     Nicotine addiction 06/15/2022    Uses nicorette lozenges       Peptic ulcer     Scoliosis     Seizures     Tobacco use 06/02/2023     Past Surgical History:   Procedure Laterality Date    COLONOSCOPY N/A 4/5/2023    Procedure: COLONOSCOPY sutab;  Surgeon: Angelo Salgado MD;  Location: [facility];  Service: Endoscopy;  Laterality: N/A;    ESOPHAGOGASTRODUODENOSCOPY N/A 4/5/2023    Procedure: EGD (ESOPHAGOGASTRODUODENOSCOPY);  Surgeon: Angelo Salgado MD;  Location: [facility];  Service: Endoscopy;  Laterality: N/A;    KNEE ARTHROSCOPY       Family History   Problem Relation Name Age of Onset    No Known Problems Mother      No Known Problems Father      Heart disease Neg Hx      Hypertension Neg Hx       Social History[1]  Review of Systems   Constitutional:  Negative for fever.   HENT:  Negative for  sore throat.    Respiratory:  Negative for shortness of breath.    Cardiovascular:  Negative for chest pain.   Gastrointestinal:  Negative for nausea.   Genitourinary:  Negative for dysuria.   Musculoskeletal:  Negative for back pain.   Skin:  Negative for rash.   Neurological:  Positive for seizures. Negative for weakness.   Hematological:  Does not bruise/bleed easily.       Physical Exam     Initial Vitals [04/16/25 1919]   BP Pulse Resp Temp SpO2   136/84 78 16 98.8 °F (37.1 °C) 96 %      MAP       --         Physical Exam    Nursing note and vitals reviewed.  Constitutional: He appears well-developed.   Eyes: EOM are normal.   Neck:   Normal range of motion.  Cardiovascular:            No murmur heard.  Pulmonary/Chest: No respiratory distress.   Abdominal: He exhibits no distension.   Musculoskeletal:         General: Normal range of motion.      Cervical back: Normal range of motion.     Neurological: He is alert.   Alert and oriented to person place and time. No facial droop. CN 2-12 intact. Fluent speech with expression and comprehension. Strength 5/5 and sensation intact in upper and lower extremities. Sensation present and equal in both sides of face. Finger to nose testing normal.   Skin: Skin is warm.   Psychiatric: He has a normal mood and affect.         ED Course   Procedures  Labs Reviewed   CBC W/ AUTO DIFFERENTIAL    Narrative:     The following orders were created for panel order Complete Blood Count (CBC).  Procedure                               Abnormality         Status                     ---------                               -----------         ------                     CBC with Differential[[id number]]                                                        Please view results for these tests on the individual orders.   COMPREHENSIVE METABOLIC PANEL   TSH   CBC WITH DIFFERENTIAL   LAMOTRIGINE LEVEL     EKG Readings: (Independently Interpreted)   Initial Reading: No STEMI. Rhythm: Normal  Sinus Rhythm. Heart Rate: 69. Ectopy: No Ectopy. Conduction: Normal.       Imaging Results    None          Medications - No data to display  Medical Decision Making  DDX: Seizure - lacks any significant head trauma, meningeal signs, focal neurologic deficits, immunocompromised state, active cancer, history of ETOH or Benzo withdrawal, suspicion for drug intoxication.  Patient has had multiple CT scans in the past, and this has in line with his prior seizure activity, risk of radiation will not perform CT at this time unless there is a neurologic deficit noted.  DX: CBC, BMP, AED levels. UA, Urine drug screen.  TX:  Benzodiazepine PRN  Dispo: Likely discharge pending negative workup.        Amount and/or Complexity of Data Reviewed  Labs: ordered.                                      Clinical Impression:  Final diagnoses:  [R56.9] Seizure                   Colin Jeffrey MD  25         [1]   Social History  Tobacco Use    Smoking status: Former     Current packs/day: 0.00     Average packs/day: 0.5 packs/day for 23.7 years (11.8 ttl pk-yrs)     Types: Cigarettes     Start date:      Quit date: 2016     Years since quittin.6     Passive exposure: Past    Smokeless tobacco: Current     Types: Chew   Substance Use Topics    Alcohol use: Not Currently    Drug use: Not Currently     Types: Marijuana        Colin Jeffrey MD  25

## 2025-04-19 LAB — W LAMOTRIGINE: 5 UG/ML

## 2025-04-21 ENCOUNTER — TELEPHONE (OUTPATIENT)
Dept: INTERNAL MEDICINE | Facility: CLINIC | Age: 50
End: 2025-04-21
Payer: COMMERCIAL

## 2025-04-21 ENCOUNTER — PATIENT OUTREACH (OUTPATIENT)
Dept: ADMINISTRATIVE | Facility: CLINIC | Age: 50
End: 2025-04-21
Payer: COMMERCIAL

## 2025-04-21 NOTE — TELEPHONE ENCOUNTER
"EPIC in-basket high urgency message sent to the staff of Shoaib Adkins FNP-C stating "Good morning, C3 nurse completed the TCC post hospital discharge follow up call with Luis Daniel Wynne Jr..  He currently has a scheduled HOSFU appointment with RENE Clemons on 4/24/2025 @ 1:30 PM, that he would like to change to a virtual appointment due to transportation issues.  Please contact patient with updated HOSFU appointment information."  "

## 2025-04-21 NOTE — PROGRESS NOTES
C3 nurse spoke with Luis Daniel Wynne Jr.  for a TCC post hospital discharge follow up call. The patient has a scheduled HOSFU appointment with  Shoaib Adkins FNP-C on 4/24/2025 @ 1:30 PM.

## 2025-04-21 NOTE — TELEPHONE ENCOUNTER
Spoke with pt to inform him that HOSPF/U must be in-clinic d/t chest pain & seizures - may require f/u EKG & neuro exam.    Suggested pt schedule appt nearer his house w/ NP or MD/ for hospital follow-up d/t transportation issues. Pt VU. Cx pt's OV w/ Shoaib Adkins NP

## 2025-04-21 NOTE — TELEPHONE ENCOUNTER
----- Message from Nurse Valdez sent at 4/21/2025 10:37 AM CDT -----  Regarding: HOSFU appointment  Good morning, C3 nurse completed the TCC post hospital discharge follow up call with Luis Daniel Wynne Jr..  He currently has a scheduled HOSFU appointment with RENE Clemons on 4/24/2025 @ 1:30 PM, that he would like to change to a virtual appointment due to transportation issues.  Please contact patient with updated HOSFU appointment information.  Thank you, Vicki FRASER, lpnTransition of Care

## 2025-04-22 ENCOUNTER — NURSE TRIAGE (OUTPATIENT)
Dept: ADMINISTRATIVE | Facility: CLINIC | Age: 50
End: 2025-04-22
Payer: COMMERCIAL

## 2025-04-22 NOTE — TELEPHONE ENCOUNTER
Pt calling in, has been having seizures for about 3 years. Had one last night. Experiences seizure like activity for about a week after the seizure. Current /100. Doesn't eat or drink well, doesn't sleep well. Wanting to go to a hospital where they can monitor him on an EEG machine. Advised he can go to main campus. He was previously scheduled to have EEG done there earlier this month. Pt verbalized understanding. Advised to call back with further concerns.    Reason for Disposition   Nursing judgment or information in reference    Protocols used: No Guideline [id number]

## 2025-04-23 ENCOUNTER — PATIENT MESSAGE (OUTPATIENT)
Dept: INTERNAL MEDICINE | Facility: CLINIC | Age: 50
End: 2025-04-23
Payer: COMMERCIAL

## 2025-04-23 ENCOUNTER — TELEPHONE (OUTPATIENT)
Facility: CLINIC | Age: 50
End: 2025-04-23
Payer: COMMERCIAL

## 2025-04-23 NOTE — TELEPHONE ENCOUNTER
Called patient to schedule virtual follow up appointment with Kimber, patient scheduled 5/9/25 at 11:00 am. Patient requested that I cancel his PCP appointment since he will be following up with Kimber instead.

## 2025-05-09 ENCOUNTER — OFFICE VISIT (OUTPATIENT)
Dept: NEUROLOGY | Facility: CLINIC | Age: 50
End: 2025-05-09
Payer: COMMERCIAL

## 2025-05-09 DIAGNOSIS — F39 MOOD DISORDER: ICD-10-CM

## 2025-05-09 DIAGNOSIS — R68.89 SPELLS OF DECREASED ATTENTIVENESS: ICD-10-CM

## 2025-05-09 DIAGNOSIS — R94.01 ABNORMAL EEG: ICD-10-CM

## 2025-05-09 DIAGNOSIS — R56.9 SEIZURE-LIKE ACTIVITY: Primary | ICD-10-CM

## 2025-05-09 PROCEDURE — 98007 SYNCH AUDIO-VIDEO EST HI 40: CPT | Mod: 95,,,
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
## 2025-05-12 ENCOUNTER — PATIENT MESSAGE (OUTPATIENT)
Dept: UROLOGY | Facility: CLINIC | Age: 50
End: 2025-05-12
Payer: COMMERCIAL

## 2025-05-12 DIAGNOSIS — R10.10 PAIN OF UPPER ABDOMEN: Primary | ICD-10-CM

## 2025-05-15 ENCOUNTER — PATIENT MESSAGE (OUTPATIENT)
Dept: NEUROLOGY | Facility: CLINIC | Age: 50
End: 2025-05-15
Payer: COMMERCIAL

## 2025-05-22 ENCOUNTER — HOSPITAL ENCOUNTER (OUTPATIENT)
Dept: RADIOLOGY | Facility: HOSPITAL | Age: 50
Discharge: HOME | End: 2025-05-22
Attending: SPECIALIST
Payer: COMMERCIAL

## 2025-05-22 DIAGNOSIS — R10.10 PAIN OF UPPER ABDOMEN: ICD-10-CM

## 2025-05-22 PROCEDURE — 74176 CT ABD & PELVIS W/O CONTRAST: CPT | Mod: TC

## 2025-05-22 PROCEDURE — 74176 CT ABD & PELVIS W/O CONTRAST: CPT | Mod: 26,,, | Performed by: RADIOLOGY

## 2025-05-23 ENCOUNTER — TELEPHONE (OUTPATIENT)
Dept: NEUROLOGY | Facility: CLINIC | Age: 50
End: 2025-05-23
Payer: COMMERCIAL

## 2025-05-23 ENCOUNTER — PATIENT MESSAGE (OUTPATIENT)
Dept: NEUROLOGY | Facility: CLINIC | Age: 50
End: 2025-05-23
Payer: COMMERCIAL

## 2025-05-23 NOTE — TELEPHONE ENCOUNTER
Left message explaining that he must attend his appointment with Dr. Parisi on 6/12/25, 8 am, before EMU can be ordered again. I have canceled the order placed by CECIL Weinberg.     (*A patient contract will be discussed at the 6/12 visit regarding EMU scheduling since he has canceled 5 x and left AMA when admitted via ED.)

## 2025-05-27 ENCOUNTER — OFFICE VISIT (OUTPATIENT)
Dept: UROLOGY | Facility: CLINIC | Age: 50
End: 2025-05-27
Attending: SPECIALIST
Payer: COMMERCIAL

## 2025-05-27 VITALS — WEIGHT: 181.88 LBS | HEIGHT: 70 IN | BODY MASS INDEX: 26.04 KG/M2

## 2025-05-27 DIAGNOSIS — N20.1 RIGHT URETERAL CALCULUS: Primary | ICD-10-CM

## 2025-05-27 PROCEDURE — 1160F RVW MEDS BY RX/DR IN RCRD: CPT | Mod: CPTII,S$GLB,, | Performed by: SPECIALIST

## 2025-05-27 PROCEDURE — 99999 PR PBB SHADOW E&M-EST. PATIENT-LVL III: CPT | Mod: PBBFAC,,, | Performed by: SPECIALIST

## 2025-05-27 PROCEDURE — 1159F MED LIST DOCD IN RCRD: CPT | Mod: CPTII,S$GLB,, | Performed by: SPECIALIST

## 2025-05-27 PROCEDURE — 3008F BODY MASS INDEX DOCD: CPT | Mod: CPTII,S$GLB,, | Performed by: SPECIALIST

## 2025-05-27 PROCEDURE — 99214 OFFICE O/P EST MOD 30 MIN: CPT | Mod: S$GLB,,, | Performed by: SPECIALIST

## 2025-05-27 RX ORDER — KETOROLAC TROMETHAMINE 10 MG/1
10 TABLET, FILM COATED ORAL EVERY 6 HOURS PRN
Qty: 15 TABLET | Refills: 0 | Status: SHIPPED | OUTPATIENT
Start: 2025-05-27

## 2025-05-27 NOTE — PROGRESS NOTES
"[facility] - Urology   Clinic Note    SUBJECTIVE:     Chief Complaint   Patient presents with    Results       Referral from: No ref. provider found.    History of Present Illness:  Luis Daniel Wynne Jr. is a 49 y.o. male who presents to clinic for right kidney stones.    Patient returns for follow up.  He is having an occasional episode of right flank pain which is well managed with an occasional Toradol.  He has been having intermittent right flank pain for almost 3 months now.  Repeat CT scan was reviewed and consistent width distal migration of his 5 mm stone down to the level of the iliac vessels.  Minimal hydronephrosis.    Past Medical History:   Diagnosis Date    Depression     Fibromyalgia     Marijuana use     Nicotine addiction 06/15/2022    Uses nicorette lozenges       Peptic ulcer     Scoliosis     Seizures     Tobacco use 06/02/2023       Medications Ordered Prior to Encounter[1]      OBJECTIVE:     Estimated body mass index is 26.1 kg/m² as calculated from the following:    Height as of this encounter: 5' 10" (1.778 m).    Weight as of this encounter: 82.5 kg (181 lb 14.1 oz).    Vital Signs (Most Recent)  There were no vitals filed for this visit.    Physical Exam:    Physical Exam     NEURO: grossly normal with no focal deficits  PSYCH: appropriate mood and affect    Genitourinary Exam:  No acute distress      LABS:     Lab Results   Component Value Date    BUN 10 04/17/2025    CREATININE 1.0 04/17/2025    WBC 12.31 04/17/2025    HGB 14.8 04/17/2025    HCT 42.5 04/17/2025     04/17/2025    AST 21 04/16/2025    ALT 31 04/16/2025    ALKPHOS 91 04/16/2025    ALBUMIN 4.1 04/16/2025    HGBA1C 5.1 06/26/2024    INR 0.9 04/17/2025        Urinalysis:   No results found for: "UAREFLEX"     PSA:  No results found for: "PSA", "PSADIAG", "PSATOTAL", "PSAFREE"    Testosterone:  Lab Results   Component Value Date    TOTALTESTOST >1500 (H) 09/16/2022    TESTOSTERONE 333 06/26/2024    " TESTOSTERONE 43.7 (L) 06/26/2024        Imaging:  I have personally reviewed all relevant imaging studies.    Results for orders placed or performed during the hospital encounter of 05/22/25 (from the past 2160 hours)   CT Renal Stone Study ABD Pelvis WO    Narrative    EXAMINATION:  CT RENAL STONE STUDY ABD PELVIS WO    CLINICAL HISTORY:  Upper abdominal pain, unspecifiedFlank pain, kidney stone suspected;    CT/Cardiac Nuclear exams in prior 12 months: 1    TECHNIQUE:  CT abdomen and pelvis without IV contrast.  Coronal reformats prepared.  Iterative reconstruction was utilized.    COMPARISON:  02/21/2025    FINDINGS:  5 mm distal right ureteral stone or 2 smaller immediately adjacent stones.  No hydronephrosis.  No other urinary tract stones.    Stones within an otherwise unremarkable appearing gallbladder.  Heterogeneous, fatty infiltrated liver.  Limited evaluation for focal masses without IV contrast.  Unremarkable adrenals, spleen and pancreas.  No dilated bowel.  No ascites.  No enlarged lymph nodes.  Coarse calcifications within the prostate gland.  No acute osseous abnormalities.      Impression    1. 5 mm distal right ureteral stone versus 2 smaller adjacent stones without hydronephrosis.  No other urinary tract stones  2. Cholelithiasis  3. Hepatic steatosis.  Otherwise, please see above      Electronically signed by: Irais Prakash MD  Date:    05/23/2025  Time:    17:48     No results found for this or any previous visit (from the past 2160 hours).  CT Renal Stone Study ABD Pelvis WO  Narrative: EXAMINATION:  CT RENAL STONE STUDY ABD PELVIS WO    CLINICAL HISTORY:  Upper abdominal pain, unspecifiedFlank pain, kidney stone suspected;    CT/Cardiac Nuclear exams in prior 12 months: 1    TECHNIQUE:  CT abdomen and pelvis without IV contrast.  Coronal reformats prepared.  Iterative reconstruction was utilized.    COMPARISON:  02/21/2025    FINDINGS:  5 mm distal right ureteral stone or 2 smaller  immediately adjacent stones.  No hydronephrosis.  No other urinary tract stones.    Stones within an otherwise unremarkable appearing gallbladder.  Heterogeneous, fatty infiltrated liver.  Limited evaluation for focal masses without IV contrast.  Unremarkable adrenals, spleen and pancreas.  No dilated bowel.  No ascites.  No enlarged lymph nodes.  Coarse calcifications within the prostate gland.  No acute osseous abnormalities.  Impression: 1. 5 mm distal right ureteral stone versus 2 smaller adjacent stones without hydronephrosis.  No other urinary tract stones  2. Cholelithiasis  3. Hepatic steatosis.  Otherwise, please see above    Electronically signed by: Irais Prakash MD  Date:    05/23/2025  Time:    17:48         ASSESSMENT     1. Right ureteral calculus        PLAN:     In light of the persistence of his ureteral stones, symptoms for roughly 3 months now, I encouraged him to proceed width ureteroscopy and laser lithotripsy.  On the other hand, patient is quite pleased that his stones have migrated distally.  He favors continuing conservative management with hydration and tamsulosin.  He understands the risk of ALLISON with continued nonsteroidal anti-inflammatories.  Patient will continue to stay well hydrated and strain his urine for stones.  If he has intractable pain he will call our office for scheduling URS.  Otherwise we will see him back in 6-12 weeks.    Collin Michaud MD  Urology  [facility]     Disclaimer: This note has been generated using voice-recognition software. There may be typographical errors that have been missed during proof-reading.          [1]   Current Outpatient Medications on File Prior to Visit   Medication Sig Dispense Refill    lamoTRIgine (LAMICTAL) 150 MG Tab Take 1 tablet (150 mg total) by mouth 2 (two) times daily. 60 tablet 11    dextroamphetamine-amphetamine (ADDERALL XR) 20 MG 24 hr capsule Take one capsule by mouth every morning (Patient not taking:  Reported on 5/27/2025) 30 capsule 0    tamsulosin (FLOMAX) 0.4 mg Cap Take 1 capsule (0.4 mg total) by mouth once daily. 30 capsule 0    [DISCONTINUED] FLUoxetine 40 MG capsule Take 40 mg by mouth once daily. (Patient not taking: Reported on 5/27/2025)      [DISCONTINUED] ketorolac (TORADOL) 10 mg tablet Take 1 tablet (10 mg total) by mouth every 6 (six) hours as needed for Pain. (Patient not taking: Reported on 5/27/2025) 15 tablet 0     No current facility-administered medications on file prior to visit.

## 2025-06-12 ENCOUNTER — TELEPHONE (OUTPATIENT)
Dept: NEUROLOGY | Facility: CLINIC | Age: 50
End: 2025-06-12
Payer: COMMERCIAL

## 2025-06-12 ENCOUNTER — OFFICE VISIT (OUTPATIENT)
Dept: NEUROLOGY | Facility: CLINIC | Age: 50
End: 2025-06-12
Payer: COMMERCIAL

## 2025-06-12 DIAGNOSIS — R94.01 ABNORMAL EEG: ICD-10-CM

## 2025-06-12 DIAGNOSIS — R68.89 SPELLS OF DECREASED ATTENTIVENESS: ICD-10-CM

## 2025-06-12 DIAGNOSIS — R56.9 SEIZURE-LIKE ACTIVITY: ICD-10-CM

## 2025-06-12 DIAGNOSIS — G40.909 SEIZURE DISORDER: Primary | ICD-10-CM
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
## 2025-06-12 NOTE — TELEPHONE ENCOUNTER
Dr. FAIRBANKS has cleared this patient from needing a  for his EMU admission. He verbalized understanding that having this  is the gold standard and that should he have an event that is non-epileptic and the alert button is not pressed this may result in an inconclusive study.     Scheduled 8/8/25, 11am, full moon on 8/9/25 and he says he needs to be scheduled during the full moon. Aware he will be connected to lines to his head, chest, arm, have an IV placed; will not be able to leave the room until all equipment is removed at discharge. Instructions reviewed as we have discussed these many times. Mailed to him via [employer]

## 2025-06-19 ENCOUNTER — PATIENT MESSAGE (OUTPATIENT)
Dept: UROLOGY | Facility: CLINIC | Age: 50
End: 2025-06-19
Payer: COMMERCIAL

## 2025-06-20 ENCOUNTER — PATIENT MESSAGE (OUTPATIENT)
Dept: NEUROLOGY | Facility: CLINIC | Age: 50
End: 2025-06-20
Payer: COMMERCIAL

## 2025-06-22 PROBLEM — N13.30 HYDROURETERONEPHROSIS: Status: ACTIVE | Noted: 2025-06-22

## 2025-06-22 PROBLEM — R82.81 PYURIA: Status: ACTIVE | Noted: 2025-06-22

## 2025-06-22 PROBLEM — N20.1 CALCULUS OF URETEROVESICAL JUNCTION (UVJ): Status: ACTIVE | Noted: 2025-06-22

## 2025-06-23 PROBLEM — R82.81 PYURIA: Status: RESOLVED | Noted: 2025-06-22 | Resolved: 2025-06-23

## 2025-06-23 PROBLEM — N20.1 CALCULUS OF URETEROVESICAL JUNCTION (UVJ): Status: RESOLVED | Noted: 2025-06-22 | Resolved: 2025-06-23

## 2025-06-24 ENCOUNTER — PATIENT OUTREACH (OUTPATIENT)
Dept: ADMINISTRATIVE | Facility: CLINIC | Age: 50
End: 2025-06-24
Payer: COMMERCIAL

## 2025-06-24 ENCOUNTER — TELEPHONE (OUTPATIENT)
Dept: UROLOGY | Facility: CLINIC | Age: 50
End: 2025-06-24
Payer: COMMERCIAL

## 2025-06-24 ENCOUNTER — PATIENT MESSAGE (OUTPATIENT)
Dept: UROLOGY | Facility: CLINIC | Age: 50
End: 2025-06-24
Payer: COMMERCIAL

## 2025-06-24 DIAGNOSIS — N20.1 RIGHT URETERAL CALCULUS: Primary | ICD-10-CM

## 2025-06-24 NOTE — TELEPHONE ENCOUNTER
Patient  didn't need to be seen today just yuridia surgery on yesterday. Will follow up in 3 weeks.

## 2025-06-24 NOTE — TELEPHONE ENCOUNTER
Copied from CRM #[id number]. Topic: Appointments - Appointment Scheduling  >> Jun 23, 2025  4:17 PM Shyla wrote:  Type:  Appointment Request    Caller is requesting a same day appointment.  Caller declined first available appointment listed below.    Name of Caller:Nurse   When is the first available appointment?07/ 15  Symptoms:hospital f/u   Best Call Back Number:[phone]   Additional Information:

## 2025-06-24 NOTE — PROGRESS NOTES
C3 nurse spoke with Luis Daniel Wynne Jr. for a TCC post hospital discharge follow up call. The patient does not have a scheduled HOSFU appointment with Zenobia Dunbar MD within 5-7 days post hospital discharge date 06/23/25. C3 nurse was unable to schedule HOSFU appointment in [facility].    Message sent to PCP staff requesting they contact patient and schedule follow up appointment.

## 2025-06-24 NOTE — TELEPHONE ENCOUNTER
Copied from CRM #[id number]. Topic: General Inquiry - Patient Advice  >> Jun 24, 2025  9:07 AM Allison wrote:  Type:  Needs Medical Advice    Who Called: pt  Would the patient rather a call back or a response via [facility]? call  Best Call Back Number: [phone]   Additional Information: pt states he was speaking with office regarding his appt for today and was disconnected asking for a call back

## 2025-06-24 NOTE — TELEPHONE ENCOUNTER
Norco on back order at two pharmacies. Please consider alternative. Pharmacy updated to [facility].

## 2025-06-24 NOTE — TELEPHONE ENCOUNTER
Copied from CRM #[id number]. Topic: Appointments - Appointment Rescheduling  >> Jun 24, 2025  8:35 AM Pete wrote:  Type: Requesting to speak with nurse    Who Called: PT  Regarding: would like to change appointment to Virtual today. PT can not drive due to seizures.   Would the patient rather a call back or a response via [facility]? Call back  Best Call Back Number: [phone]

## 2025-06-25 ENCOUNTER — TELEPHONE (OUTPATIENT)
Dept: UROLOGY | Facility: CLINIC | Age: 50
End: 2025-06-25
Payer: COMMERCIAL

## 2025-06-25 DIAGNOSIS — N20.0 KIDNEY STONE ON RIGHT SIDE: ICD-10-CM

## 2025-06-25 DIAGNOSIS — N13.30 HYDROURETERONEPHROSIS: ICD-10-CM

## 2025-06-25 DIAGNOSIS — N20.1 CALCULUS OF URETEROVESICAL JUNCTION (UVJ): ICD-10-CM

## 2025-06-25 RX ORDER — HYDROCODONE BITARTRATE AND ACETAMINOPHEN 5; 325 MG/1; MG/1
1 TABLET ORAL EVERY 6 HOURS PRN
Qty: 20 TABLET | Refills: 0 | Status: SHIPPED | OUTPATIENT
Start: 2025-06-25 | End: 2025-07-01

## 2025-06-28 NOTE — SUBJECTIVE & OBJECTIVE
Past Medical History:   Diagnosis Date    Depression     Fibromyalgia     Marijuana use     Nicotine addiction 06/15/2022    Uses nicorette lozenges       Peptic ulcer     Scoliosis     Seizures     Tobacco use 2023       Past Surgical History:   Procedure Laterality Date    COLONOSCOPY N/A 2023    Procedure: COLONOSCOPY sutab;  Surgeon: Angelo Salgado MD;  Location: [facility] ENDO;  Service: Endoscopy;  Laterality: N/A;    ESOPHAGOGASTRODUODENOSCOPY N/A 2023    Procedure: EGD (ESOPHAGOGASTRODUODENOSCOPY);  Surgeon: Angelo Salgado MD;  Location: [facility] ENDO;  Service: Endoscopy;  Laterality: N/A;    KNEE ARTHROSCOPY         Review of patient's allergies indicates:  No Known Allergies    Current Facility-Administered Medications on File Prior to Encounter   Medication    [COMPLETED] levETIRAcetam injection 1,500 mg     Current Outpatient Medications on File Prior to Encounter   Medication Sig    dextroamphetamine-amphetamine (ADDERALL XR) 20 MG 24 hr capsule Take one capsule by mouth every morning (Patient taking differently: Take 20 mg by mouth every morning. As needed but rare)    FLUoxetine 40 MG capsule Take 40 mg by mouth once daily.    ketorolac (TORADOL) 10 mg tablet Take 1 tablet (10 mg total) by mouth every 6 (six) hours as needed for Pain.    lamoTRIgine (LAMICTAL) 150 MG Tab Take 1 tablet (150 mg total) by mouth 2 (two) times daily.    tamsulosin (FLOMAX) 0.4 mg Cap Take 1 capsule (0.4 mg total) by mouth once daily.     Family History       Problem Relation (Age of Onset)    No Known Problems Mother, Father          Tobacco Use    Smoking status: Former     Current packs/day: 0.00     Average packs/day: 0.5 packs/day for 23.7 years (11.8 ttl pk-yrs)     Types: Cigarettes     Start date:      Quit date: 2016     Years since quittin.6     Passive exposure: Past    Smokeless tobacco: Current     Types: Chew   Substance and Sexual Activity    Alcohol use: Not Currently    Drug use: Not  Currently     Types: Marijuana    Sexual activity: Yes     Partners: Female     Review of Systems   Constitutional: Negative.    HENT: Negative.     Eyes: Negative.    Respiratory: Negative.     Cardiovascular: Negative.    Gastrointestinal: Negative.    Genitourinary: Negative.    Musculoskeletal: Negative.    Neurological:  Positive for seizures.   Psychiatric/Behavioral:  The patient is nervous/anxious.      Objective:     Vital Signs (Most Recent):  Temp: 98.2 °F (36.8 °C) (04/17/25 0229)  Pulse: 80 (04/17/25 0229)  Resp: 16 (04/17/25 0229)  BP: 132/75 (04/17/25 0229)  SpO2: 96 % (04/17/25 0229) Vital Signs (24h Range):  Temp:  [98.2 °F (36.8 °C)-98.8 °F (37.1 °C)] 98.2 °F (36.8 °C)  Pulse:  [66-80] 80  Resp:  [14-19] 16  SpO2:  [95 %-98 %] 96 %  BP: (112-139)/(65-84) 132/75        There is no height or weight on file to calculate BMI.     Physical Exam  HENT:      Head: Normocephalic and atraumatic.      Nose: Nose normal.      Mouth/Throat:      Mouth: Mucous membranes are moist.   Eyes:      Extraocular Movements: Extraocular movements intact.      Pupils: Pupils are equal, round, and reactive to light.   Cardiovascular:      Rate and Rhythm: Regular rhythm.   Pulmonary:      Breath sounds: Normal breath sounds.   Abdominal:      Palpations: Abdomen is soft.   Musculoskeletal:         General: Normal range of motion.      Cervical back: Neck supple.   Skin:     General: Skin is warm.   Neurological:      General: No focal deficit present.      Mental Status: He is alert.   Psychiatric:         Mood and Affect: Mood normal.              CRANIAL NERVES     CN III, IV, VI   Pupils are equal, round, and reactive to light.       Significant Labs: All pertinent labs within the past 24 hours have been reviewed.  Recent Lab Results         04/16/25 2013 04/16/25 1952        Phencyclidine Negative         Albumin   4.1       ALP   91       ALT   31       Amphetamines, Urine Negative         Anion Gap   11        Appearance, UA Clear         AST   21       Bacteria, UA None         Barbituates, Urine Negative         Baso #   0.04       Basophil %   0.3       Benzodiazepine, Urine Negative         Bilirubin (UA) 1+  Comment: Positive urine bilirubin is not confirmed. Correlate with serum bilirubin and clinical presentation.         BILIRUBIN TOTAL   0.9  Comment: For infants and newborns, interpretation of results should be based   on gestational age, weight and in agreement with clinical   observations.    Premature Infant recommended reference ranges:   0-24 hours:  <8.0 mg/dL   24-48 hours: <12.0 mg/dL   3-5 days:    <15.0 mg/dL   6-29 days:   <15.0 mg/dL       BUN   10       Calcium   9.4       Chloride   104       CO2   24       Cocaine, Urine Negative         Color, UA Green         Creatinine   1.1       Urine Creatinine 261.4         eGFR   >60  Comment: Estimated GFR calculated using the CKD-EPI creatinine (2021) equation.       Eos #   0.08       Eos %   0.6       Glucose   101       Glucose, UA Negative         Gran # (ANC)   11.02       Hematocrit   42.9       Hemoglobin   15.3       Extra Tube Hold for add-ons.  Comment: Auto resulted.            Hyaline Casts, UA 0         Immature Grans (Abs)   0.05  Comment: Mild elevation in immature granulocytes is non specific and can be seen in a variety of conditions including stress response, acute inflammation, trauma and pregnancy. Correlation with other laboratory and clinical findings is essential.       Immature Granulocytes   0.4       Ketones, UA Negative         Leukocyte Esterase, UA Negative         Lymph #   1.33       Lymph %   10.1       MCH   31.1       MCHC   35.7       MCV   87       Methadone Screen, Urine Negative         Microscopic Comment   Comment: Other formed elements not mentioned in the report are not present in the microscopic examination.         Mono #   0.59       Mono %   4.5       MPV   9.6       Neut %   84.1       NITRITE UA Negative          nRBC   0       Blood, UA Trace         Opiates, Urine Negative         OTHER OBSERVATIONS, UA Occasional  Comment: Sperm present         pH, UA 6.0         Platelet Count   297       Potassium   4.2       PROTEIN TOTAL   7.7       Protein, UA 1+  Comment: Recommend a 24 hour urine protein or a urine protein/creatinine ratio if globulin induced proteinuria is clinically suspected.         RBC   4.92       RBC, UA 1         RDW   12.5       Sodium   139       Spec Grav UA >=1.030         Marijuana (THC) Metabolite Negative         TSH   0.411       Urobilinogen, UA Negative         WBC, UA 1         WBC   13.11               Significant Imaging: I have reviewed all pertinent imaging results/findings within the past 24 hours.   Him/He

## 2025-07-05 ENCOUNTER — RESULTS FOLLOW-UP (OUTPATIENT)
Dept: UROLOGY | Facility: CLINIC | Age: 50
End: 2025-07-05

## 2025-07-07 ENCOUNTER — TELEPHONE (OUTPATIENT)
Dept: UROLOGY | Facility: CLINIC | Age: 50
End: 2025-07-07
Payer: COMMERCIAL

## 2025-07-07 NOTE — TELEPHONE ENCOUNTER
----- Message from Emmanuel Haro MD sent at 7/5/2025 11:42 PM CDT -----  Dense mostly calcium oxalate monohydrate calculus. Increase water intake to 4-16 oz bottles daily. Derease oalates in your diet,  ----- Message -----  From: Lab, Background User  Sent: 7/5/2025  12:43 PM CDT  To: Emmanuel Haro MD

## 2025-07-07 NOTE — TELEPHONE ENCOUNTER
Patient verbally understood results and stated he drinks a lot of tea and that he will cut back with drinking tea.

## 2025-07-11 ENCOUNTER — PATIENT MESSAGE (OUTPATIENT)
Dept: UROLOGY | Facility: CLINIC | Age: 50
End: 2025-07-11
Payer: COMMERCIAL

## 2025-07-11 DIAGNOSIS — N20.0 KIDNEY STONE: Primary | ICD-10-CM

## 2025-07-11 RX ORDER — TAMSULOSIN HYDROCHLORIDE 0.4 MG/1
0.4 CAPSULE ORAL DAILY
Qty: 30 CAPSULE | Refills: 3 | Status: SHIPPED | OUTPATIENT
Start: 2025-07-11 | End: 2026-07-11

## 2025-07-15 ENCOUNTER — TELEPHONE (OUTPATIENT)
Dept: NEUROLOGY | Facility: CLINIC | Age: 50
End: 2025-07-15
Payer: COMMERCIAL

## 2025-07-21 ENCOUNTER — OFFICE VISIT (OUTPATIENT)
Dept: NEUROLOGY | Facility: CLINIC | Age: 50
End: 2025-07-21
Payer: COMMERCIAL

## 2025-07-21 DIAGNOSIS — R56.9 SEIZURE: Primary | ICD-10-CM

## 2025-07-21 PROCEDURE — 99215 OFFICE O/P EST HI 40 MIN: CPT | Mod: S$GLB,,, | Performed by: PSYCHIATRY & NEUROLOGY

## 2025-07-21 PROCEDURE — G2211 COMPLEX E/M VISIT ADD ON: HCPCS | Mod: S$GLB,,, | Performed by: PSYCHIATRY & NEUROLOGY

## 2025-07-21 PROCEDURE — 1160F RVW MEDS BY RX/DR IN RCRD: CPT | Mod: CPTII,S$GLB,, | Performed by: PSYCHIATRY & NEUROLOGY

## 2025-07-21 PROCEDURE — 1159F MED LIST DOCD IN RCRD: CPT | Mod: CPTII,S$GLB,, | Performed by: PSYCHIATRY & NEUROLOGY

## 2025-07-21 PROCEDURE — 99999 PR PBB SHADOW E&M-EST. PATIENT-LVL II: CPT | Mod: PBBFAC,,, | Performed by: PSYCHIATRY & NEUROLOGY

## 2025-07-21 RX ORDER — FLUOXETINE HYDROCHLORIDE 40 MG/1
40 CAPSULE ORAL DAILY
COMMUNITY
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
## 2025-07-29 ENCOUNTER — OUTPATIENT CASE MANAGEMENT (OUTPATIENT)
Dept: ADMINISTRATIVE | Facility: OTHER | Age: 50
End: 2025-07-29
Payer: COMMERCIAL

## 2025-07-29 NOTE — PROGRESS NOTES
Called and spoke with Mr. Wynne in response to a referral to Outpatient Care Management (OPCM).Purpose of OPCM provided.He reports prolonged scheduling issues over 4 years but now is [place] [facility] activities starting on 8/8/2025.     Mr. Wynne reports no current needs for OPCM. Dr. Dunbar, PCP notified.   Sent letter via Patient Portal providing contact information for OPCM should future needs arise.     Referral closed.     ELYSIA Mae, RN, CCM [facility]  [email or web address]  TEL:  [phone]

## 2025-07-29 NOTE — LETTER
Luis Daniel ANNA [id number]      Dear Luis Daniel Wynne Jr.,     I work with [facility]'s Outpatient Care Management Department. We received a referral to call you to discuss your medical history. These services are free of charge and are offered to [facility] patients who have recently been discharged from any of our facilities or who have medical conditions that may require the skill of a nurse to assist with management.     I am a Registered Nurse who specializes in connecting patients with available medical and financial resources as well as addressing any educational needs that may be indicated.    The Outpatient Care Management Department can be reached at [phone], from 8:00AM to 4:30 PM, on Monday thru Friday.     Additionally, [facility] also has a program where a nurse is available 24/7 to answer questions or provide medical advice, their number is [phone].      Best regards,      Basilia Burton RN  Outpatient Care Management  Phone #: [phone]

## 2025-08-08 ENCOUNTER — HOSPITAL ENCOUNTER (INPATIENT)
Facility: HOSPITAL | Age: 50
LOS: 4 days | Discharge: HOME | DRG: 101 | End: 2025-08-12
Attending: PSYCHIATRY & NEUROLOGY | Admitting: PSYCHIATRY & NEUROLOGY
Payer: COMMERCIAL

## 2025-08-08 DIAGNOSIS — G40.109 TEMPORAL LOBE EPILEPSY: Primary | ICD-10-CM

## 2025-08-08 DIAGNOSIS — R56.9 SEIZURE-LIKE ACTIVITY: ICD-10-CM

## 2025-08-08 DIAGNOSIS — R68.89 SPELLS OF DECREASED ATTENTIVENESS: ICD-10-CM

## 2025-08-08 DIAGNOSIS — G40.909 SEIZURE DISORDER: ICD-10-CM

## 2025-08-08 DIAGNOSIS — R94.01 ABNORMAL EEG: ICD-10-CM

## 2025-08-08 PROBLEM — F32.9 MAJOR DEPRESSIVE DISORDER: Status: ACTIVE | Noted: 2023-10-24

## 2025-08-08 LAB
ABSOLUTE EOSINOPHIL (OHS): 0.28 K/UL
ABSOLUTE MONOCYTE (OHS): 0.61 K/UL (ref 0.3–1)
ABSOLUTE NEUTROPHIL COUNT (OHS): 5.21 K/UL (ref 1.8–7.7)
ALBUMIN SERPL BCP-MCNC: 4.6 G/DL (ref 3.5–5.2)
ALP SERPL-CCNC: 87 UNIT/L (ref 40–150)
ALT SERPL W/O P-5'-P-CCNC: 28 UNIT/L (ref 0–55)
AMPHET UR QL SCN: ABNORMAL
ANION GAP (OHS): 11 MMOL/L (ref 8–16)
AST SERPL-CCNC: 23 UNIT/L (ref 0–50)
BARBITURATE SCN PRESENT UR: NEGATIVE
BASOPHILS # BLD AUTO: 0.04 K/UL
BASOPHILS NFR BLD AUTO: 0.5 %
BENZODIAZ UR QL SCN: NEGATIVE
BILIRUB SERPL-MCNC: 1 MG/DL (ref 0.1–1)
BUN SERPL-MCNC: 10 MG/DL (ref 6–20)
CALCIUM SERPL-MCNC: 9.4 MG/DL (ref 8.7–10.5)
CANNABINOIDS UR QL SCN: ABNORMAL
CHLORIDE SERPL-SCNC: 106 MMOL/L (ref 95–110)
CO2 SERPL-SCNC: 23 MMOL/L (ref 23–29)
COCAINE UR QL SCN: NEGATIVE
CREAT SERPL-MCNC: 1.1 MG/DL (ref 0.5–1.4)
CREAT UR-MCNC: 156 MG/DL (ref 23–375)
ERYTHROCYTE [DISTWIDTH] IN BLOOD BY AUTOMATED COUNT: 12.4 % (ref 11.5–14.5)
GFR SERPLBLD CREATININE-BSD FMLA CKD-EPI: >60 ML/MIN/1.73/M2
GLUCOSE SERPL-MCNC: 82 MG/DL (ref 70–110)
HCT VFR BLD AUTO: 45.5 % (ref 40–54)
HGB BLD-MCNC: 15.8 GM/DL (ref 14–18)
IMM GRANULOCYTES # BLD AUTO: 0.04 K/UL (ref 0–0.04)
IMM GRANULOCYTES NFR BLD AUTO: 0.5 % (ref 0–0.5)
LYMPHOCYTES # BLD AUTO: 1.72 K/UL (ref 1–4.8)
MCH RBC QN AUTO: 31.1 PG (ref 27–31)
MCHC RBC AUTO-ENTMCNC: 34.7 G/DL (ref 32–36)
MCV RBC AUTO: 90 FL (ref 82–98)
METHADONE UR QL SCN: NEGATIVE
NUCLEATED RBC (/100WBC) (OHS): 0 /100 WBC
OHS QRS DURATION: 90 MS
OHS QTC CALCULATION: 398 MS
OPIATES UR QL SCN: NEGATIVE
PCP UR QL: NEGATIVE
PLATELET # BLD AUTO: 287 K/UL (ref 150–450)
PMV BLD AUTO: 10.2 FL (ref 9.2–12.9)
POTASSIUM SERPL-SCNC: 4.2 MMOL/L (ref 3.5–5.1)
PROT SERPL-MCNC: 7.6 GM/DL (ref 6–8.4)
RBC # BLD AUTO: 5.08 M/UL (ref 4.6–6.2)
RELATIVE EOSINOPHIL (OHS): 3.5 %
RELATIVE LYMPHOCYTE (OHS): 21.8 % (ref 18–48)
RELATIVE MONOCYTE (OHS): 7.7 % (ref 4–15)
RELATIVE NEUTROPHIL (OHS): 66 % (ref 38–73)
SODIUM SERPL-SCNC: 140 MMOL/L (ref 136–145)
T4 FREE SERPL-MCNC: 0.92 NG/DL (ref 0.71–1.51)
TSH SERPL-ACNC: 1.15 UIU/ML (ref 0.4–4)
WBC # BLD AUTO: 7.9 K/UL (ref 3.9–12.7)

## 2025-08-08 PROCEDURE — 36415 COLL VENOUS BLD VENIPUNCTURE: CPT

## 2025-08-08 PROCEDURE — 11000001 HC ACUTE MED/SURG PRIVATE ROOM

## 2025-08-08 PROCEDURE — 25000003 PHARM REV CODE 250: Performed by: PSYCHIATRY & NEUROLOGY

## 2025-08-08 PROCEDURE — 99223 1ST HOSP IP/OBS HIGH 75: CPT | Mod: ,,, | Performed by: PSYCHIATRY & NEUROLOGY

## 2025-08-08 PROCEDURE — 80307 DRUG TEST PRSMV CHEM ANLYZR: CPT

## 2025-08-08 PROCEDURE — 84443 ASSAY THYROID STIM HORMONE: CPT

## 2025-08-08 PROCEDURE — 93010 ELECTROCARDIOGRAM REPORT: CPT | Mod: ,,, | Performed by: INTERNAL MEDICINE

## 2025-08-08 PROCEDURE — 95720 EEG PHY/QHP EA INCR W/VEEG: CPT | Mod: ,,, | Performed by: PSYCHIATRY & NEUROLOGY

## 2025-08-08 PROCEDURE — 93005 ELECTROCARDIOGRAM TRACING: CPT

## 2025-08-08 PROCEDURE — 85025 COMPLETE CBC W/AUTO DIFF WBC: CPT

## 2025-08-08 PROCEDURE — 95714 VEEG EA 12-26 HR UNMNTR: CPT

## 2025-08-08 PROCEDURE — 95700 EEG CONT REC W/VID EEG TECH: CPT

## 2025-08-08 PROCEDURE — 82310 ASSAY OF CALCIUM: CPT

## 2025-08-08 PROCEDURE — 84439 ASSAY OF FREE THYROXINE: CPT

## 2025-08-08 PROCEDURE — S4991 NICOTINE PATCH NONLEGEND: HCPCS | Performed by: PSYCHIATRY & NEUROLOGY

## 2025-08-08 PROCEDURE — 80175 DRUG SCREEN QUAN LAMOTRIGINE: CPT

## 2025-08-08 RX ORDER — FLUOXETINE 20 MG/1
40 CAPSULE ORAL DAILY
Status: DISCONTINUED | OUTPATIENT
Start: 2025-08-09 | End: 2025-08-12 | Stop reason: HOSPADM

## 2025-08-08 RX ORDER — DOCUSATE SODIUM 100 MG/1
100 CAPSULE, LIQUID FILLED ORAL 2 TIMES DAILY PRN
Status: DISCONTINUED | OUTPATIENT
Start: 2025-08-08 | End: 2025-08-12 | Stop reason: HOSPADM

## 2025-08-08 RX ORDER — ACETAMINOPHEN 325 MG/1
650 TABLET ORAL EVERY 4 HOURS PRN
Status: DISCONTINUED | OUTPATIENT
Start: 2025-08-08 | End: 2025-08-12 | Stop reason: HOSPADM

## 2025-08-08 RX ORDER — SODIUM CHLORIDE 0.9 % (FLUSH) 0.9 %
10 SYRINGE (ML) INJECTION
Status: DISCONTINUED | OUTPATIENT
Start: 2025-08-08 | End: 2025-08-12 | Stop reason: HOSPADM

## 2025-08-08 RX ORDER — LORAZEPAM 2 MG/ML
2 INJECTION INTRAMUSCULAR EVERY 5 MIN PRN
Status: DISCONTINUED | OUTPATIENT
Start: 2025-08-08 | End: 2025-08-12 | Stop reason: HOSPADM

## 2025-08-08 RX ORDER — IBUPROFEN 200 MG
1 TABLET ORAL DAILY
Status: DISCONTINUED | OUTPATIENT
Start: 2025-08-08 | End: 2025-08-12 | Stop reason: HOSPADM

## 2025-08-08 RX ADMIN — NICOTINE 1 PATCH: 14 PATCH, EXTENDED RELEASE TRANSDERMAL at 09:08

## 2025-08-09 PROCEDURE — 11000001 HC ACUTE MED/SURG PRIVATE ROOM

## 2025-08-09 PROCEDURE — 94799 UNLISTED PULMONARY SVC/PX: CPT

## 2025-08-09 PROCEDURE — 25000003 PHARM REV CODE 250: Performed by: PSYCHIATRY & NEUROLOGY

## 2025-08-09 PROCEDURE — 95714 VEEG EA 12-26 HR UNMNTR: CPT

## 2025-08-09 PROCEDURE — 94761 N-INVAS EAR/PLS OXIMETRY MLT: CPT

## 2025-08-09 PROCEDURE — 25000003 PHARM REV CODE 250

## 2025-08-09 PROCEDURE — 99233 SBSQ HOSP IP/OBS HIGH 50: CPT | Mod: ,,, | Performed by: PSYCHIATRY & NEUROLOGY

## 2025-08-09 PROCEDURE — 95720 EEG PHY/QHP EA INCR W/VEEG: CPT | Mod: ,,, | Performed by: STUDENT IN AN ORGANIZED HEALTH CARE EDUCATION/TRAINING PROGRAM

## 2025-08-09 PROCEDURE — 99900035 HC TECH TIME PER 15 MIN (STAT)

## 2025-08-09 PROCEDURE — S4991 NICOTINE PATCH NONLEGEND: HCPCS | Performed by: PSYCHIATRY & NEUROLOGY

## 2025-08-09 RX ADMIN — FLUOXETINE HYDROCHLORIDE 40 MG: 20 CAPSULE ORAL at 09:08

## 2025-08-09 RX ADMIN — NICOTINE 1 PATCH: 14 PATCH, EXTENDED RELEASE TRANSDERMAL at 09:08

## 2025-08-10 PROCEDURE — 25000003 PHARM REV CODE 250: Performed by: PSYCHIATRY & NEUROLOGY

## 2025-08-10 PROCEDURE — 94761 N-INVAS EAR/PLS OXIMETRY MLT: CPT

## 2025-08-10 PROCEDURE — 11000001 HC ACUTE MED/SURG PRIVATE ROOM

## 2025-08-10 PROCEDURE — 95720 EEG PHY/QHP EA INCR W/VEEG: CPT | Mod: ,,, | Performed by: PSYCHIATRY & NEUROLOGY

## 2025-08-10 PROCEDURE — 25000003 PHARM REV CODE 250

## 2025-08-10 PROCEDURE — 95714 VEEG EA 12-26 HR UNMNTR: CPT

## 2025-08-10 PROCEDURE — 99233 SBSQ HOSP IP/OBS HIGH 50: CPT | Mod: ,,, | Performed by: STUDENT IN AN ORGANIZED HEALTH CARE EDUCATION/TRAINING PROGRAM

## 2025-08-10 PROCEDURE — S4991 NICOTINE PATCH NONLEGEND: HCPCS | Performed by: PSYCHIATRY & NEUROLOGY

## 2025-08-10 RX ADMIN — FLUOXETINE HYDROCHLORIDE 40 MG: 20 CAPSULE ORAL at 08:08

## 2025-08-10 RX ADMIN — NICOTINE 1 PATCH: 14 PATCH, EXTENDED RELEASE TRANSDERMAL at 08:08

## 2025-08-11 ENCOUNTER — TELEPHONE (OUTPATIENT)
Dept: NEUROLOGY | Facility: CLINIC | Age: 50
End: 2025-08-11
Payer: COMMERCIAL

## 2025-08-11 PROBLEM — G40.109 TEMPORAL LOBE EPILEPSY: Status: ACTIVE | Noted: 2023-01-25

## 2025-08-11 LAB
POCT GLUCOSE: 100 MG/DL (ref 70–110)
W LAMOTRIGINE: 3.2 UG/ML

## 2025-08-11 PROCEDURE — 11000001 HC ACUTE MED/SURG PRIVATE ROOM

## 2025-08-11 PROCEDURE — 63600175 PHARM REV CODE 636 W HCPCS

## 2025-08-11 PROCEDURE — 25000003 PHARM REV CODE 250: Performed by: PSYCHIATRY & NEUROLOGY

## 2025-08-11 PROCEDURE — 25000003 PHARM REV CODE 250

## 2025-08-11 PROCEDURE — S4991 NICOTINE PATCH NONLEGEND: HCPCS | Performed by: PSYCHIATRY & NEUROLOGY

## 2025-08-11 PROCEDURE — 99233 SBSQ HOSP IP/OBS HIGH 50: CPT | Mod: ,,, | Performed by: PSYCHIATRY & NEUROLOGY

## 2025-08-11 PROCEDURE — 95714 VEEG EA 12-26 HR UNMNTR: CPT

## 2025-08-11 PROCEDURE — 95720 EEG PHY/QHP EA INCR W/VEEG: CPT | Mod: ,,, | Performed by: PSYCHIATRY & NEUROLOGY

## 2025-08-11 RX ORDER — LAMOTRIGINE 100 MG/1
200 TABLET ORAL 2 TIMES DAILY
Status: DISCONTINUED | OUTPATIENT
Start: 2025-08-12 | End: 2025-08-12 | Stop reason: HOSPADM

## 2025-08-11 RX ORDER — LAMOTRIGINE 150 MG/1
150 TABLET ORAL ONCE
Status: COMPLETED | OUTPATIENT
Start: 2025-08-11 | End: 2025-08-11

## 2025-08-11 RX ORDER — LORAZEPAM 1 MG/1
2 TABLET ORAL ONCE
Status: DISCONTINUED | OUTPATIENT
Start: 2025-08-11 | End: 2025-08-11

## 2025-08-11 RX ADMIN — LORAZEPAM 2 MG: 2 INJECTION INTRAMUSCULAR; INTRAVENOUS at 11:08

## 2025-08-11 RX ADMIN — LAMOTRIGINE 150 MG: 150 TABLET ORAL at 11:08

## 2025-08-11 RX ADMIN — LAMOTRIGINE 175 MG: 25 TABLET ORAL at 08:08

## 2025-08-11 RX ADMIN — FLUOXETINE HYDROCHLORIDE 40 MG: 20 CAPSULE ORAL at 08:08

## 2025-08-11 RX ADMIN — NICOTINE 1 PATCH: 14 PATCH, EXTENDED RELEASE TRANSDERMAL at 08:08

## 2025-08-12 VITALS
SYSTOLIC BLOOD PRESSURE: 106 MMHG | TEMPERATURE: 99 F | DIASTOLIC BLOOD PRESSURE: 70 MMHG | OXYGEN SATURATION: 94 % | BODY MASS INDEX: 23.42 KG/M2 | HEART RATE: 86 BPM | RESPIRATION RATE: 18 BRPM | HEIGHT: 70 IN | WEIGHT: 163.56 LBS

## 2025-08-12 PROBLEM — R56.9 SEIZURE-LIKE ACTIVITY: Status: ACTIVE | Noted: 2025-08-12

## 2025-08-12 PROCEDURE — S4991 NICOTINE PATCH NONLEGEND: HCPCS | Performed by: PSYCHIATRY & NEUROLOGY

## 2025-08-12 PROCEDURE — 25000003 PHARM REV CODE 250

## 2025-08-12 PROCEDURE — 25000003 PHARM REV CODE 250: Performed by: PSYCHIATRY & NEUROLOGY

## 2025-08-12 RX ORDER — LAMOTRIGINE 200 MG/1
200 TABLET ORAL 2 TIMES DAILY
Qty: 60 TABLET | Refills: 4 | Status: SHIPPED | OUTPATIENT
Start: 2025-08-12

## 2025-08-12 RX ADMIN — LAMOTRIGINE 200 MG: 100 TABLET ORAL at 07:08

## 2025-08-12 RX ADMIN — FLUOXETINE HYDROCHLORIDE 40 MG: 20 CAPSULE ORAL at 07:08

## 2025-08-12 RX ADMIN — NICOTINE 1 PATCH: 14 PATCH, EXTENDED RELEASE TRANSDERMAL at 07:08

## 2025-08-13 ENCOUNTER — PATIENT OUTREACH (OUTPATIENT)
Dept: ADMINISTRATIVE | Facility: CLINIC | Age: 50
End: 2025-08-13
Payer: COMMERCIAL

## 2025-08-24 ENCOUNTER — HOSPITAL ENCOUNTER (OUTPATIENT)
Dept: RADIOLOGY | Facility: HOSPITAL | Age: 50
Discharge: HOME | End: 2025-08-24
Payer: COMMERCIAL

## 2025-08-24 DIAGNOSIS — G40.109 TEMPORAL LOBE EPILEPSY: ICD-10-CM

## 2025-08-24 PROCEDURE — 25500020 PHARM REV CODE 255

## 2025-08-24 PROCEDURE — A9585 GADOBUTROL INJECTION: HCPCS

## 2025-08-24 PROCEDURE — 70553 MRI BRAIN STEM W/O & W/DYE: CPT | Mod: TC

## 2025-08-24 PROCEDURE — 70553 MRI BRAIN STEM W/O & W/DYE: CPT | Mod: 26,,, | Performed by: RADIOLOGY

## 2025-08-24 RX ORDER — GADOBUTROL 604.72 MG/ML
8 INJECTION INTRAVENOUS
Status: COMPLETED | OUTPATIENT
Start: 2025-08-24 | End: 2025-08-24

## 2025-08-24 RX ADMIN — GADOBUTROL 8 ML: 604.72 INJECTION INTRAVENOUS at 03:08
